# Patient Record
Sex: FEMALE | Race: WHITE | NOT HISPANIC OR LATINO | Employment: FULL TIME | URBAN - NONMETROPOLITAN AREA
[De-identification: names, ages, dates, MRNs, and addresses within clinical notes are randomized per-mention and may not be internally consistent; named-entity substitution may affect disease eponyms.]

---

## 2017-02-09 ENCOUNTER — TRANSFERRED RECORDS (OUTPATIENT)
Dept: HEALTH INFORMATION MANAGEMENT | Facility: HOSPITAL | Age: 52
End: 2017-02-09

## 2017-12-04 ENCOUNTER — TELEPHONE (OUTPATIENT)
Dept: FAMILY MEDICINE | Facility: OTHER | Age: 52
End: 2017-12-04

## 2017-12-04 ENCOUNTER — OFFICE VISIT (OUTPATIENT)
Dept: FAMILY MEDICINE | Facility: OTHER | Age: 52
End: 2017-12-04
Attending: FAMILY MEDICINE
Payer: COMMERCIAL

## 2017-12-04 VITALS
OXYGEN SATURATION: 95 % | DIASTOLIC BLOOD PRESSURE: 88 MMHG | HEIGHT: 63 IN | SYSTOLIC BLOOD PRESSURE: 138 MMHG | WEIGHT: 215 LBS | BODY MASS INDEX: 38.09 KG/M2 | HEART RATE: 112 BPM | RESPIRATION RATE: 18 BRPM | TEMPERATURE: 98.8 F

## 2017-12-04 DIAGNOSIS — I10 BENIGN ESSENTIAL HYPERTENSION: ICD-10-CM

## 2017-12-04 DIAGNOSIS — Z12.31 ENCOUNTER FOR SCREENING MAMMOGRAM FOR BREAST CANCER: ICD-10-CM

## 2017-12-04 DIAGNOSIS — Z76.89 ENCOUNTER TO ESTABLISH CARE: Primary | ICD-10-CM

## 2017-12-04 DIAGNOSIS — F41.9 ANXIETY: ICD-10-CM

## 2017-12-04 PROCEDURE — 99203 OFFICE O/P NEW LOW 30 MIN: CPT | Performed by: FAMILY MEDICINE

## 2017-12-04 RX ORDER — CHLORAL HYDRATE 500 MG
1 CAPSULE ORAL DAILY
COMMUNITY
End: 2023-11-13

## 2017-12-04 RX ORDER — LISINOPRIL AND HYDROCHLOROTHIAZIDE 12.5; 2 MG/1; MG/1
1 TABLET ORAL DAILY
COMMUNITY
End: 2018-01-17

## 2017-12-04 RX ORDER — CETIRIZINE HYDROCHLORIDE 10 MG/1
10 TABLET ORAL EVERY OTHER DAY
COMMUNITY
End: 2020-11-22

## 2017-12-04 RX ORDER — MULTIPLE VITAMINS W/ MINERALS TAB 9MG-400MCG
1 TAB ORAL DAILY
COMMUNITY

## 2017-12-04 RX ORDER — NAPROXEN SODIUM 220 MG
440 TABLET ORAL DAILY
Status: ON HOLD | COMMUNITY
End: 2020-12-06

## 2017-12-04 ASSESSMENT — ANXIETY QUESTIONNAIRES
4. TROUBLE RELAXING: SEVERAL DAYS
2. NOT BEING ABLE TO STOP OR CONTROL WORRYING: SEVERAL DAYS
GAD7 TOTAL SCORE: 6
1. FEELING NERVOUS, ANXIOUS, OR ON EDGE: SEVERAL DAYS
6. BECOMING EASILY ANNOYED OR IRRITABLE: SEVERAL DAYS
IF YOU CHECKED OFF ANY PROBLEMS ON THIS QUESTIONNAIRE, HOW DIFFICULT HAVE THESE PROBLEMS MADE IT FOR YOU TO DO YOUR WORK, TAKE CARE OF THINGS AT HOME, OR GET ALONG WITH OTHER PEOPLE: SOMEWHAT DIFFICULT
7. FEELING AFRAID AS IF SOMETHING AWFUL MIGHT HAPPEN: NOT AT ALL
3. WORRYING TOO MUCH ABOUT DIFFERENT THINGS: SEVERAL DAYS
5. BEING SO RESTLESS THAT IT IS HARD TO SIT STILL: SEVERAL DAYS

## 2017-12-04 ASSESSMENT — PAIN SCALES - GENERAL: PAINLEVEL: NO PAIN (0)

## 2017-12-04 ASSESSMENT — PATIENT HEALTH QUESTIONNAIRE - PHQ9: SUM OF ALL RESPONSES TO PHQ QUESTIONS 1-9: 4

## 2017-12-04 NOTE — NURSING NOTE
"Chief Complaint   Patient presents with     Establish Care       Initial /88 (BP Location: Right arm, Patient Position: Sitting, Cuff Size: Adult Regular)  Pulse 112  Temp 98.8  F (37.1  C) (Tympanic)  Resp 18  Ht 5' 2.75\" (1.594 m)  Wt 215 lb (97.5 kg)  SpO2 95%  BMI 38.39 kg/m2 Estimated body mass index is 38.39 kg/(m^2) as calculated from the following:    Height as of this encounter: 5' 2.75\" (1.594 m).    Weight as of this encounter: 215 lb (97.5 kg).  Medication Reconciliation: complete   Janiya Harper LPN      "

## 2017-12-04 NOTE — MR AVS SNAPSHOT
"              After Visit Summary   12/4/2017    Rosalie RODRIGUES    MRN: [id number]           Patient Information     Date Of Birth          1965        Visit Information        Provider Department      12/4/2017 2:30 PM Anne Solorio MD [facility]        Today's Diagnoses     Encounter to establish care    -  1    Encounter for screening mammogram for breast cancer           Follow-ups after your visit        Your next 10 appointments already scheduled     Jan 08, 2018  9:00 AM CST   (Arrive by 8:45 AM)   MA SCREENING DIGITAL BILATERAL with HCMA1   [facility] [place] [facility] ([facility] [place] )    [address]  [place] [address]   [phone]           Do not use any powder, lotion or deodorant under your arms or on your breast. If you do, we will ask you to remove it before your exam.  Wear comfortable, two-piece clothing.  If you have any allergies, tell your care team.  Bring any previous mammograms from other facilities or have them mailed to the breast center. Three-dimensional (3D) mammograms are available at [facility] locations in [facility], [facility], [place], [place], and [place]. [facility] locations include [place] and [facility] in [place]. Benefits of 3D mammograms include: - Improved rate of cancer detection - Decreases your chance of having to go back for more tests, which means fewer: - \"False-positive\" results (This means that there is an abnormal area but it isn't cancer.) - Invasive testing procedures, such as a biopsy or surgery - Can provide clearer images of the breast if you have dense breast tissue. 3D mammography is an optional exam that anyone can have with a 2D mammogram. It doesn't replace or take the place of a 2D mammogram. 2D mammograms remain an effective screening test for all women.  Not all insurance companies cover the cost of a 3D mammogram. Check with your insurance.       " "     2018  9:30 AM CST   (Arrive by 9:15 AM)   PHYSICAL with Anne Solorio MD   [facility] [place] ([facility] [place] )    [place] [address]   [phone]              Future tests that were ordered for you today     Open Future Orders        Priority Expected Expires Ordered    MA Screening Digital Bilateral Routine  2018            Who to contact     If you have questions or need follow up information about today's clinic visit or your schedule please contact [facility] directly at [phone].  Normal or non-critical lab and imaging results will be communicated to you by Edenbasehart, letter or phone within 4 business days after the clinic has received the results. If you do not hear from us within 7 days, please contact the clinic through Edenbasehart or phone. If you have a critical or abnormal lab result, we will notify you by phone as soon as possible.  Submit refill requests through Daily Pic or call your pharmacy and they will forward the refill request to us. Please allow 3 business days for your refill to be completed.          Additional Information About Your Visit        MyChart Information     Daily Pic lets you send messages to your doctor, view your test results, renew your prescriptions, schedule appointments and more. To sign up, go to [email or web address] . Click on \"Log in\" on the left side of the screen, which will take you to the Welcome page. Then click on \"Sign up Now\" on the right side of the page.     You will be asked to enter the access code listed below, as well as some personal information. Please follow the directions to create your username and password.     Your access code is: [email or web address]  Expires: 3/4/2018  3:06 PM     Your access code will  in 90 days. If you need help or a new code, please call your [facility] or [phone].        Care EveryWhere ID     This is your Care EveryWhere ID. This " "could be used by other organizations to access your [place] medical records  [id number]        Your Vitals Were     Pulse Temperature Respirations Height Pulse Oximetry BMI (Body Mass Index)    112 98.8  F (37.1  C) (Tympanic) 18 5' 2.75\" (1.594 m) 95% 38.39 kg/m2       Blood Pressure from Last 3 Encounters:   12/04/17 140/88    Weight from Last 3 Encounters:   12/04/17 215 lb (97.5 kg)               Primary Care Provider Fax #    Physician No Ref-Primary [phone]       No address on file        Equal Access to Services     [facility]: Hadii aad ku hadasho Soomaali, waaxda luqadaha, qaybta kaalmada adenancyyaeugene, rohit mcclendon . So [facility] [phone].    ATENCIÓN: Si habla español, tiene a melendez disposición servicios gratuitos de asistencia lingüística. Llame al [phone].    We comply with applicable federal civil rights laws and [place] laws. We do not discriminate on the basis of race, color, national origin, age, disability, sex, sexual orientation, or gender identity.            Thank you!     [facility] you [facility]  [facility]. Our goal is always to provide you with excellent care. Hearing back from our patients is one way we can continue to improve our services. Please take a few minutes to complete the written survey that you may receive in the mail after your visit with us. Thank you!             Your Updated Medication List - Protect others around you: Learn how to safely use, store and throw away your medicines at www.disposemymeds.org.          This list is accurate as of: 12/4/17  3:06 PM.  Always use your most recent med list.                   Brand Name Dispense Instructions for use Diagnosis    ALEVE PO      Take 600 mg by mouth daily        CALCIUM + D PO      Take 1,200 mg by mouth daily        cetirizine 10 MG tablet    zyrTEC     Take 10 mg by mouth every other day        fish oil-omega-3 fatty acids 1000 MG capsule      Take 1 " capsule by mouth daily        lisinopril-hydrochlorothiazide 20-12.5 MG per tablet    PRINZIDE/ZESTORETIC     Take 1 tablet by mouth daily        Multi-vitamin Tabs tablet      Take 1 tablet by mouth daily

## 2017-12-04 NOTE — PROGRESS NOTES
"SUBJECTIVE:  Rosalie is a 52 year old female who comes in today for establish care.  Relocated back to area after years away.    HTN - on Lisinopril/HCTZ for several years.  Stable. Use to have home cuff, but misplaced.  No prior diabetes, heart disease or hyperlipidemia.      Arthritis - reports extensive evaluation with prior provider.  Symptoms controlled with 2 Aleve in the morning.    .  Menses becoming irregular.  LMP 10/18, prior to htat 2017.  Last pap negative .  No prior abnormal pap.  Last mammogram same time?    Eye \"freckle\" - follows with ophthalmology.  Has follow up at Dr. Gopi Carrasco's office in [facility].     Anxiety - work related.  Is online .  Prior Lexapro.  Declines counseling.  Prefers to avoid medication at this time.    Current Outpatient Prescriptions   Medication     lisinopril-hydrochlorothiazide (PRINZIDE/ZESTORETIC) 20-12.5 MG per tablet     fish oil-omega-3 fatty acids 1000 MG capsule     multivitamin, therapeutic with minerals (MULTI-VITAMIN) TABS tablet     Naproxen Sodium (ALEVE PO)     cetirizine (ZYRTEC) 10 MG tablet     Calcium Citrate-Vitamin D (CALCIUM + D PO)     No current facility-administered medications for this visit.         Allergies   Allergen Reactions     Adhesive Tape      bandaids - non cloth     Seasonal Allergies      Past Medical History:   Diagnosis Date     Anxiety      Arthritis      Benign essential hypertension      Past Surgical History:   Procedure Laterality Date      tubal ligation Bilateral           SECTION      2     COLONOSCOPY      negative     ENDOSCOPY      negative     Social History     Social History     Marital status: Single     Spouse name: N/A     Number of children: N/A     Years of education: N/A     Occupational History     Not on file.     Social History Main Topics     Smoking status: Never Smoker     Smokeless tobacco: Never Used     Alcohol use Yes      Comment: occasionally " "    Drug use: No     Sexual activity: Not on file     Other Topics Concern     Not on file     Social History Narrative     No narrative on file       ROS:  General: negative for, fever, chills, headaches, dizziness  Skin: negative for, rash  Resp: No shortness of breath and No cough  CV: negative for, chest pain and lower extremity edema  GI: negative for, poor appetite, nausea, vomiting and abdominal pain  : negative  Musculoskeletal: positive for arthritis and joint pain  Neurologic: negative for, local weakness, numbness or tingling of hands and numbness or tingling of feet  Psychiatric: positive for as above, excessive stress and anxiety  Endocrine: negative for, thyroid disorder and diabetes  PHQ-9 SCORE 12/4/2017   Total Score 4     VARSHA-7 SCORE 12/4/2017   Total Score 6         OBJECTIVE:  Vitals:    12/04/17 1411 12/04/17 1656   BP: 140/88 138/88   BP Location: Right arm    Patient Position: Sitting    Cuff Size: Adult Regular Adult Large   Pulse: 112    Resp: 18    Temp: 98.8  F (37.1  C)    TempSrc: Tympanic    SpO2: 95%    Weight: 215 lb (97.5 kg)    Height: 5' 2.75\" (1.594 m)      GENERAL APPEARANCE: alert, no distress, cooperative and over weight  NECK: no adenopathy, no asymmetry, masses, or scars and thyroid normal to palpation  RESP: lungs clear to auscultation - no rales, rhonchi or wheezes  CV: regular rates and rhythm, normal S1 S2, no S3 or S4 and no murmur, click or rub -  MS: extremities normal- no gross deformities noted, no evidence of inflammation in joints, FROM in all extremities.  PSYCH: mentation appears normal. and affect normal/bright    ASSESSMENT/ORDERS:    ICD-10-CM    1. Encounter to establish care Z76.89    2. Encounter for screening mammogram for breast cancer Z12.31 MA Screening Digital Bilateral   3. Anxiety F41.9    4. Benign essential hypertension I10    5. Obesity, Class II, BMI 35-39.9, with comorbidity E66.9      PLAN:  Will obtain records, then schedule physical.  Did " offer nutrition referral - she declined.  Mammogram ordered - to be scheduled same day as physical.  BP borderline control.  Reports component of white coat syndrome.  Check again at physical.  If elevated ,consider outpatient ambulatory BP monitor.    Eye follow up as scheduled.  Defer labs to physical.    Patient Instructions   Sign releases to obtain records.  Schedule complete physical.  Mammogram ordered.        Anne Morrison

## 2017-12-05 ENCOUNTER — TELEPHONE (OUTPATIENT)
Dept: FAMILY MEDICINE | Facility: OTHER | Age: 52
End: 2017-12-05

## 2017-12-05 DIAGNOSIS — I10 ESSENTIAL HYPERTENSION, BENIGN: Primary | ICD-10-CM

## 2017-12-05 RX ORDER — LISINOPRIL AND HYDROCHLOROTHIAZIDE 12.5; 2 MG/1; MG/1
1 TABLET ORAL DAILY
Qty: 30 TABLET | Refills: 0 | Status: SHIPPED | OUTPATIENT
Start: 2017-12-05 | End: 2018-01-07

## 2017-12-05 ASSESSMENT — ANXIETY QUESTIONNAIRES: GAD7 TOTAL SCORE: 6

## 2017-12-05 NOTE — TELEPHONE ENCOUNTER
Reason for call:  Medication    1. Medication Name? Lisinopril HCTZ 20-12.54 mg - 1 tab daily  2. Is this request for a refill? Yes  3. What Pharm[facility] do you use? [facility]  4. Have you contacted your pharmacy? No    5. If yes, when?  (Please note that the turn-around-time for prescriptions is 72 business hours; I am sending your request at this time. SEND TO  [facility] Refill Pool  )  Description: Pt has not had this med filled by Dr Solorio yet. Was last prescribed by her former doctor. Pt only has 4 pills left. Wondering if Dr Solorio will fill? Please call her back at [phone]  Was an appointment offered for this a call? No   Preferred method for responding to this messageTelephone Call  If we cannot reach you directly, may we leave a detailed response at the number you provided? Yes  Can this message wait until your PCP/Provider returns if not available today? Yes, aware provider out today

## 2018-01-07 DIAGNOSIS — I10 ESSENTIAL HYPERTENSION, BENIGN: ICD-10-CM

## 2018-01-09 RX ORDER — LISINOPRIL AND HYDROCHLOROTHIAZIDE 12.5; 2 MG/1; MG/1
TABLET ORAL
Qty: 30 TABLET | Refills: 0 | Status: SHIPPED | OUTPATIENT
Start: 2018-01-09 | End: 2018-01-17

## 2018-01-09 NOTE — TELEPHONE ENCOUNTER
Lisinopril-HCTZ      Last Written Prescription Date:  12/05/2017  Last Fill Quantity: 30,   # refills: 0  Last Office Visit: 12/04/2017  Future Office visit:    Next 5 appointments (look out 90 days)     Jan 17, 2018 10:15 AM CST   (Arrive by 10:00 AM)   PHYSICAL with Anne Solorio MD   [facility] [place] ([facility] [place] )    [address] [place] [address]   [phone]

## 2018-01-17 ENCOUNTER — OFFICE VISIT (OUTPATIENT)
Dept: FAMILY MEDICINE | Facility: OTHER | Age: 53
End: 2018-01-17
Attending: FAMILY MEDICINE
Payer: COMMERCIAL

## 2018-01-17 ENCOUNTER — RADIANT APPOINTMENT (OUTPATIENT)
Dept: MAMMOGRAPHY | Facility: OTHER | Age: 53
End: 2018-01-17
Attending: FAMILY MEDICINE
Payer: COMMERCIAL

## 2018-01-17 VITALS
RESPIRATION RATE: 16 BRPM | BODY MASS INDEX: 37.03 KG/M2 | DIASTOLIC BLOOD PRESSURE: 78 MMHG | SYSTOLIC BLOOD PRESSURE: 136 MMHG | OXYGEN SATURATION: 94 % | WEIGHT: 209 LBS | HEIGHT: 63 IN | HEART RATE: 101 BPM | TEMPERATURE: 99.3 F

## 2018-01-17 DIAGNOSIS — Z13.220 LIPID SCREENING: ICD-10-CM

## 2018-01-17 DIAGNOSIS — Z12.31 ENCOUNTER FOR SCREENING MAMMOGRAM FOR BREAST CANCER: ICD-10-CM

## 2018-01-17 DIAGNOSIS — F41.9 ANXIETY: ICD-10-CM

## 2018-01-17 DIAGNOSIS — E78.5 HYPERLIPIDEMIA, UNSPECIFIED HYPERLIPIDEMIA TYPE: ICD-10-CM

## 2018-01-17 DIAGNOSIS — I10 BENIGN ESSENTIAL HYPERTENSION: ICD-10-CM

## 2018-01-17 DIAGNOSIS — I10 ESSENTIAL HYPERTENSION, BENIGN: ICD-10-CM

## 2018-01-17 DIAGNOSIS — Z23 NEED FOR PROPHYLACTIC VACCINATION AND INOCULATION AGAINST INFLUENZA: ICD-10-CM

## 2018-01-17 DIAGNOSIS — Z00.00 ROUTINE GENERAL MEDICAL EXAMINATION AT A HEALTH CARE FACILITY: Primary | ICD-10-CM

## 2018-01-17 DIAGNOSIS — Z11.59 ENCOUNTER FOR HEPATITIS C SCREENING TEST FOR LOW RISK PATIENT: ICD-10-CM

## 2018-01-17 LAB
ALBUMIN SERPL-MCNC: 4.2 G/DL (ref 3.4–5)
ALP SERPL-CCNC: 74 U/L (ref 40–150)
ALT SERPL W P-5'-P-CCNC: 62 U/L (ref 0–50)
ANION GAP SERPL CALCULATED.3IONS-SCNC: 13 MMOL/L (ref 3–14)
AST SERPL W P-5'-P-CCNC: 41 U/L (ref 0–45)
BASOPHILS # BLD AUTO: 0 10E9/L (ref 0–0.2)
BASOPHILS NFR BLD AUTO: 0.5 %
BILIRUB SERPL-MCNC: 0.5 MG/DL (ref 0.2–1.3)
BUN SERPL-MCNC: 14 MG/DL (ref 7–30)
CALCIUM SERPL-MCNC: 9 MG/DL (ref 8.5–10.1)
CHLORIDE SERPL-SCNC: 106 MMOL/L (ref 94–109)
CHOLEST SERPL-MCNC: 285 MG/DL
CO2 SERPL-SCNC: 23 MMOL/L (ref 20–32)
CREAT SERPL-MCNC: 0.78 MG/DL (ref 0.52–1.04)
DIFFERENTIAL METHOD BLD: NORMAL
EOSINOPHIL # BLD AUTO: 0.1 10E9/L (ref 0–0.7)
EOSINOPHIL NFR BLD AUTO: 1.6 %
ERYTHROCYTE [DISTWIDTH] IN BLOOD BY AUTOMATED COUNT: 12.3 % (ref 10–15)
GFR SERPL CREATININE-BSD FRML MDRD: 78 ML/MIN/1.7M2
GLUCOSE SERPL-MCNC: 91 MG/DL (ref 70–99)
HCT VFR BLD AUTO: 40.4 % (ref 35–47)
HDLC SERPL-MCNC: 57 MG/DL
HGB BLD-MCNC: 14.3 G/DL (ref 11.7–15.7)
IMM GRANULOCYTES # BLD: 0 10E9/L (ref 0–0.4)
IMM GRANULOCYTES NFR BLD: 0.2 %
LDLC SERPL CALC-MCNC: 190 MG/DL
LYMPHOCYTES # BLD AUTO: 1.6 10E9/L (ref 0.8–5.3)
LYMPHOCYTES NFR BLD AUTO: 28.4 %
MCH RBC QN AUTO: 32.4 PG (ref 26.5–33)
MCHC RBC AUTO-ENTMCNC: 35.4 G/DL (ref 31.5–36.5)
MCV RBC AUTO: 92 FL (ref 78–100)
MONOCYTES # BLD AUTO: 0.5 10E9/L (ref 0–1.3)
MONOCYTES NFR BLD AUTO: 8 %
NEUTROPHILS # BLD AUTO: 3.5 10E9/L (ref 1.6–8.3)
NEUTROPHILS NFR BLD AUTO: 61.3 %
NONHDLC SERPL-MCNC: 228 MG/DL
NRBC # BLD AUTO: 0 10*3/UL
NRBC BLD AUTO-RTO: 0 /100
PLATELET # BLD AUTO: 274 10E9/L (ref 150–450)
POTASSIUM SERPL-SCNC: 4 MMOL/L (ref 3.4–5.3)
PROT SERPL-MCNC: 7.6 G/DL (ref 6.8–8.8)
RBC # BLD AUTO: 4.41 10E12/L (ref 3.8–5.2)
SODIUM SERPL-SCNC: 142 MMOL/L (ref 133–144)
TRIGL SERPL-MCNC: 188 MG/DL
TSH SERPL DL<=0.005 MIU/L-ACNC: 2.14 MU/L (ref 0.4–4)
WBC # BLD AUTO: 5.7 10E9/L (ref 4–11)

## 2018-01-17 PROCEDURE — 90471 IMMUNIZATION ADMIN: CPT | Performed by: FAMILY MEDICINE

## 2018-01-17 PROCEDURE — 80061 LIPID PANEL: CPT | Performed by: FAMILY MEDICINE

## 2018-01-17 PROCEDURE — 86803 HEPATITIS C AB TEST: CPT | Mod: 90 | Performed by: FAMILY MEDICINE

## 2018-01-17 PROCEDURE — G0123 SCREEN CERV/VAG THIN LAYER: HCPCS | Performed by: FAMILY MEDICINE

## 2018-01-17 PROCEDURE — 87624 HPV HI-RISK TYP POOLED RSLT: CPT | Mod: 90 | Performed by: FAMILY MEDICINE

## 2018-01-17 PROCEDURE — 80050 GENERAL HEALTH PANEL: CPT | Performed by: FAMILY MEDICINE

## 2018-01-17 PROCEDURE — 90686 IIV4 VACC NO PRSV 0.5 ML IM: CPT | Performed by: FAMILY MEDICINE

## 2018-01-17 PROCEDURE — 36415 COLL VENOUS BLD VENIPUNCTURE: CPT | Performed by: FAMILY MEDICINE

## 2018-01-17 PROCEDURE — 77063 BREAST TOMOSYNTHESIS BI: CPT | Mod: TC

## 2018-01-17 PROCEDURE — 99396 PREV VISIT EST AGE 40-64: CPT | Mod: 25 | Performed by: FAMILY MEDICINE

## 2018-01-17 PROCEDURE — 77067 SCR MAMMO BI INCL CAD: CPT | Mod: TC

## 2018-01-17 PROCEDURE — 99000 SPECIMEN HANDLING OFFICE-LAB: CPT | Performed by: FAMILY MEDICINE

## 2018-01-17 RX ORDER — LISINOPRIL AND HYDROCHLOROTHIAZIDE 12.5; 2 MG/1; MG/1
1 TABLET ORAL DAILY
Qty: 90 TABLET | Refills: 3 | Status: SHIPPED | OUTPATIENT
Start: 2018-01-17 | End: 2019-01-23

## 2018-01-17 RX ORDER — HYDROXYZINE PAMOATE 25 MG/1
25-50 CAPSULE ORAL 3 TIMES DAILY PRN
Qty: 60 CAPSULE | Refills: 1 | Status: SHIPPED | OUTPATIENT
Start: 2018-01-17 | End: 2020-11-22

## 2018-01-17 ASSESSMENT — PAIN SCALES - GENERAL: PAINLEVEL: NO PAIN (0)

## 2018-01-17 ASSESSMENT — PATIENT HEALTH QUESTIONNAIRE - PHQ9: SUM OF ALL RESPONSES TO PHQ QUESTIONS 1-9: 1

## 2018-01-17 ASSESSMENT — ANXIETY QUESTIONNAIRES
7. FEELING AFRAID AS IF SOMETHING AWFUL MIGHT HAPPEN: SEVERAL DAYS
GAD7 TOTAL SCORE: 6
1. FEELING NERVOUS, ANXIOUS, OR ON EDGE: SEVERAL DAYS
4. TROUBLE RELAXING: SEVERAL DAYS
2. NOT BEING ABLE TO STOP OR CONTROL WORRYING: SEVERAL DAYS
3. WORRYING TOO MUCH ABOUT DIFFERENT THINGS: SEVERAL DAYS
6. BECOMING EASILY ANNOYED OR IRRITABLE: NOT AT ALL
5. BEING SO RESTLESS THAT IT IS HARD TO SIT STILL: SEVERAL DAYS
IF YOU CHECKED OFF ANY PROBLEMS ON THIS QUESTIONNAIRE, HOW DIFFICULT HAVE THESE PROBLEMS MADE IT FOR YOU TO DO YOUR WORK, TAKE CARE OF THINGS AT HOME, OR GET ALONG WITH OTHER PEOPLE: SOMEWHAT DIFFICULT

## 2018-01-17 NOTE — PROGRESS NOTES
SUBJECTIVE:   CC: Rosalie RODRIGUES is an 53 year old woman who presents for preventive health visit.     Healthy Habits:    Do you get at least three servings of calcium containing foods daily (dairy, green leafy vegetables, etc.)? yes    Amount of exercise or daily activities, outside of work: 5 day(s) per week; walking; yardwork    Problems taking medications regularly No    Medication side effects: No    Have you had an eye exam in the past two years? yes    Do you see a dentist twice per year? Once per year    Do you have sleep apnea, excessive snoring or daytime drowsiness?no      Hypertension Follow-up      Outpatient blood pressures are not being checked.    Low Salt Diet: no added salt    Anxiety Follow-Up    Status since last visit: Worsened; random anxiety/panic    Other associated symptoms:None    Complicating factors:   Significant life event: No   Current substance abuse: None  Depression symptoms: No    Job is stressful.  Online special education.  Planning to look for new job this summer.      VARSHA-7 SCORE 12/4/2017 1/17/2018   Total Score 6 6       GAD7      Today's PHQ-2 Score: No flowsheet data found.    PHQ-9 SCORE 1/17/2018   Total Score 1     VARSHA-7 SCORE 12/4/2017 1/17/2018   Total Score 6 6         Abuse: Current or Past(Physical, Sexual or Emotional)- No  Do you feel safe in your environment - Yes    Social History   Substance Use Topics     Smoking status: Never Smoker     Smokeless tobacco: Never Used     Alcohol use Yes      Comment: occasionally     If you drink alcohol do you typically have >3 drinks per day or >7 drinks per week? No                     Reviewed orders with patient.  Reviewed health maintenance and updated orders accordingly - Yes  Labs reviewed in [facility]    Patient over age 50, mutual decision to screen reflected in health maintenance.      Pertinent mammograms are reviewed under the imaging tab.  History of abnormal Pap smear: NO - age 30-65 PAP every 5 years with  "negative HPV co-testing recommended    Reviewed and updated as needed this visit by clinical staffTobacco  Allergies  Meds  Problems         Reviewed and updated as needed this visit by Provider        Past Medical History:   Diagnosis Date     Anxiety      Arthralgia     negative lyme, VINAY, RF, TSH, 2017     Arthritis      Benign essential hypertension      Postmenopausal bleeding     ? U/S & Endometrial biopsy done at [facility]?      Past Surgical History:   Procedure Laterality Date      tubal ligation Bilateral           SECTION  /    2     COLONOSCOPY      negative     ENDOSCOPY      negative       ROS:  C: NEGATIVE for fever, chills, change in weight  I: NEGATIVE for worrisome rashes, moles or lesions  E: NEGATIVE for vision changes or irritation  ENT: NEGATIVE for ear, mouth and throat problems  R: NEGATIVE for significant cough or SOB  B: NEGATIVE for masses, tenderness or discharge  CV: NEGATIVE for chest pain, palpitations or peripheral edema  GI: NEGATIVE for nausea, abdominal pain, heartburn, or change in bowel habits  : NEGATIVE for unusual urinary or vaginal symptoms. Periods are regular.  M: NEGATIVE for significant arthralgias or myalgia  N: NEGATIVE for weakness, dizziness or paresthesias  PSYCHIATRIC: POSITIVE for anxiety    OBJECTIVE:   /78 (BP Location: Left arm, Patient Position: Sitting, Cuff Size: Adult Large)  Pulse 101  Temp 99.3  F (37.4  C) (Tympanic)  Resp 16  Ht 5' 2.75\" (1.594 m)  Wt 209 lb (94.8 kg)  LMP 10/18/2017  SpO2 94%  BMI 37.32 kg/m2  EXAM:  GENERAL: alert, no distress and over weight  EYES: Eyes grossly normal to inspection, PERRL and conjunctivae and sclerae normal  HENT: ear canals and TM's normal, nose and mouth without ulcers or lesions  NECK: no adenopathy, no asymmetry, masses, or scars and thyroid normal to palpation  RESP: lungs clear to auscultation - no rales, rhonchi or wheezes  BREAST: normal without masses, " tenderness or nipple discharge and no palpable axillary masses or adenopathy  CV: regular rate and rhythm, normal S1 S2, no S3 or S4, no murmur, click or rub, no peripheral edema and peripheral pulses strong  ABDOMEN: soft, nontender, no hepatosplenomegaly, no masses and bowel sounds normal   (female): normal female external genitalia, normal urethral meatus, vaginal mucosa pink, moist, well rugated, and normal cervix/adnexa/uterus without masses or discharge  MS: no gross musculoskeletal defects noted, no edema  SKIN: no suspicious lesions or rashes  NEURO: Normal strength and tone, mentation intact and speech normal  PSYCH: mentation appears normal, affect normal/bright    ASSESSMENT/PLAN:       ICD-10-CM    1. Routine general medical examination at a health care facility Z00.00 TSH with free T4 reflex     CBC with platelets and differential     Comprehensive metabolic panel     Lipid Profile (Chol, Trig, HDL, LDL calc)     A pap thin layer screen with  HPV - recommended age 30 - 65 years (select HPV order below)     HPV High Risk Types DNA Cervical   2. Obesity, Class II, BMI 35-39.9, with comorbidity E66.9 TSH with free T4 reflex     Lipid Profile (Chol, Trig, HDL, LDL calc)   3. Benign essential hypertension I10 Comprehensive metabolic panel   4. Lipid screening Z13.220 Lipid Profile (Chol, Trig, HDL, LDL calc)   5. Encounter for hepatitis C screening test for low risk patient Z11.59 Hepatitis C antibody   6. Need for prophylactic vaccination and inoculation against influenza Z23 HC FLU VAC PRESRV FREE QUAD SPLIT VIR 3+YRS IM     Vaccine Administration, Initial [47728]   7. Anxiety F41.9 hydrOXYzine (VISTARIL) 25 MG capsule   8. Essential hypertension, benign I10 lisinopril-hydrochlorothiazide (PRINZIDE/ZESTORETIC) 20-12.5 MG per tablet     Will call with remainder of labs and pap results.  Mammogram done today.  Colonoscopy done 2015, negative, due 2025.  Trial of Vistaril for anxiety as needed.  Call if not  "effective.  Consider counseling.  Consider nutrition consult.  Flu shot given.    COUNSELING:   Reviewed preventive health counseling, as reflected in patient instructions       Regular exercise       Healthy diet/nutrition       Vision screening       Hearing screening       Immunizations    Vaccinated for: Influenza           Alcohol Use       Contraception       Colon cancer screening       Consider Hep C screening for patients born between 1945 and 1965       reports that she has never smoked. She has never used smokeless tobacco.    Estimated body mass index is 37.32 kg/(m^2) as calculated from the following:    Height as of this encounter: 5' 2.75\" (1.594 m).    Weight as of this encounter: 209 lb (94.8 kg).   Weight management plan: Discussed healthy diet and exercise guidelines and patient will follow up in 12 months in clinic to re-evaluate.    Counseling Resources:  ATP IV Guidelines  Pooled Cohorts Equation Calculator  Breast Cancer Risk Calculator  FRAX Risk Assessment  ICSI Preventive Guidelines  Dietary Guidelines for Americans, 2010  USDA's MyPlate  ASA Prophylaxis  Lung CA Screening    Anne Morrison MD  [facility] [place]    Injectable Influenza Immunization Documentation    1.  Is the person to be vaccinated sick today?   No    2. Does the person to be vaccinated have an allergy to a component   of the vaccine?   No  Egg Allergy Algorithm Link    3. Has the person to be vaccinated ever had a serious reaction   to influenza vaccine in the past?   No    4. Has the person to be vaccinated ever had Guillain-Barré syndrome?   No    Form completed by Kathy Peña           "

## 2018-01-17 NOTE — PATIENT INSTRUCTIONS
Will call with remainder of labs and pap results.  Mammogram done today.  Colonoscopy done 2015, negative, due 2025.  Trial of Vistaril for anxiety as needed.  Call if not effective.  Consider counseling.  Consider nutrition consult.  Flu shot given.    Preventive Health Recommendations  Female Ages 50 - 64    Yearly exam: See your health care provider every year in order to  o Review health changes.   o Discuss preventive care.    o Review your medicines if your doctor has prescribed any.      Get a Pap test every three years (unless you have an abnormal result and your provider advises testing more often).    If you get Pap tests with HPV test, you only need to test every 5 years, unless you have an abnormal result.     You do not need a Pap test if your uterus was removed (hysterectomy) and you have not had cancer.    You should be tested each year for STDs (sexually transmitted diseases) if you're at risk.     Have a mammogram every 1 to 2 years.    Have a colonoscopy at age 50, or have a yearly FIT test (stool test). These exams screen for colon cancer.      Have a cholesterol test every 5 years, or more often if advised.    Have a diabetes test (fasting glucose) every three years. If you are at risk for diabetes, you should have this test more often.     If you are at risk for osteoporosis (brittle bone disease), think about having a bone density scan (DEXA).    Shots: Get a flu shot each year. Get a tetanus shot every 10 years.    Nutrition:     Eat at least 5 servings of fruits and vegetables each day.    Eat whole-grain bread, whole-wheat pasta and brown rice instead of white grains and rice.    Talk to your provider about Calcium and Vitamin D.     Lifestyle    Exercise at least 150 minutes a week (30 minutes a day, 5 days a week). This will help you control your weight and prevent disease.    Limit alcohol to one drink per day.    No smoking.     Wear sunscreen to prevent skin cancer.     See your dentist  every six months for an exam and cleaning.    See your eye doctor every 1 to 2 years.

## 2018-01-17 NOTE — MR AVS SNAPSHOT
After Visit Summary   1/17/2018    Rosalie RODRIGUES    MRN: [id number]           Patient Information     Date Of Birth          1965        Visit Information        Provider Department      1/17/2018 10:15 Anne Shelton MD [facility] [place]        Today's Diagnoses     Routine general medical examination at a health care facility    -  1    Obesity, Class II, BMI 35-39.9, with comorbidity        Benign essential hypertension        Lipid screening        Encounter for hepatitis C screening test for low risk patient        Need for prophylactic vaccination and inoculation against influenza        Anxiety        Essential hypertension, benign          Care Instructions    Will call with remainder of labs and pap results.  Mammogram done today.  Colonoscopy done 2015, negative, due 2025.  Trial of Vistaril for anxiety as needed.  Call if not effective.  Consider counseling.  Consider nutrition consult.  Flu shot given.    Preventive Health Recommendations  Female Ages 50 - 64    Yearly exam: See your health care provider every year in order to  o Review health changes.   o Discuss preventive care.    o Review your medicines if your doctor has prescribed any.      Get a Pap test every three years (unless you have an abnormal result and your provider advises testing more often).    If you get Pap tests with HPV test, you only need to test every 5 years, unless you have an abnormal result.     You do not need a Pap test if your uterus was removed (hysterectomy) and you have not had cancer.    You should be tested each year for STDs (sexually transmitted diseases) if you're at risk.     Have a mammogram every 1 to 2 years.    Have a colonoscopy at age 50, or have a yearly FIT test (stool test). These exams screen for colon cancer.      Have a cholesterol test every 5 years, or more often if advised.    Have a diabetes test (fasting glucose) every three years. If you are at risk for diabetes,  "you should have this test more often.     If you are at risk for osteoporosis (brittle bone disease), think about having a bone density scan (DEXA).    Shots: Get a flu shot each year. Get a tetanus shot every 10 years.    Nutrition:     Eat at least 5 servings of fruits and vegetables each day.    Eat whole-grain bread, whole-wheat pasta and brown rice instead of white grains and rice.    Talk to your provider about Calcium and Vitamin D.     Lifestyle    Exercise at least 150 minutes a week (30 minutes a day, 5 days a week). This will help you control your weight and prevent disease.    Limit alcohol to one drink per day.    No smoking.     Wear sunscreen to prevent skin cancer.     See your dentist every six months for an exam and cleaning.    See your eye doctor every 1 to 2 years.            Follow-ups after your visit        Who to contact     If you have questions or need follow up information about today's clinic visit or your schedule please contact [facility] directly at [phone].  Normal or non-critical lab and imaging results will be communicated to you by [employer] la [employer]art, letter or phone within 4 business days after the clinic has received the results. If you do not hear from us within 7 days, please contact the clinic through Lending Clubt or phone. If you have a critical or abnormal lab result, we will notify you by phone as soon as possible.  Submit refill requests through Meditech or call your pharmacy and they will forward the refill request to us. Please allow 3 business days for your refill to be completed.          Additional Information About Your Visit        Meditech Information     Meditech lets you send messages to your doctor, view your test results, renew your prescriptions, schedule appointments and more. To sign up, go to [email or web address] . Click on \"Log in\" on the left side of the screen, which will take you to the Welcome page. Then click on \"Sign up Now\" on the right side of " "the page.     You will be asked to enter the access code listed below, as well as some personal information. Please follow the directions to create your username and password.     Your access code is: [email or web address]  Expires: 3/4/2018  3:06 PM     Your access code will  in 90 days. If you need help or a new code, please call your [place] clinic or [phone].        Care EveryWhere ID     This is your Care EveryWhere ID. This could be used by other organizations to access your [facility] medical records  [id number]        Your Vitals Were     Pulse Temperature Respirations Height Last Period Pulse Oximetry    101 99.3  F (37.4  C) (Tympanic) 16 5' 2.75\" (1.594 m) 10/18/2017 94%    BMI (Body Mass Index)                   37.32 kg/m2            Blood Pressure from Last 3 Encounters:   18 136/78   17 138/88    Weight from Last 3 Encounters:   18 209 lb (94.8 kg)   17 215 lb (97.5 kg)              We Performed the Following     CBC with platelets and differential     Comprehensive metabolic panel     HC FLU VAC PRESRV FREE QUAD SPLIT VIR 3+YRS IM     Hepatitis C antibody     Lipid Profile (Chol, Trig, HDL, LDL calc)     TSH with free T4 reflex     Vaccine Administration, Initial [[id number]]          Today's Medication Changes          These changes are accurate as of: 18 10:44 AM.  If you have any questions, ask your nurse or doctor.               Start taking these medicines.        Dose/Directions    hydrOXYzine 25 MG capsule   Commonly known as:  VISTARIL   Used for:  Anxiety   Started by:  Anne Solorio MD        Dose:  25-50 mg   Take 1-2 capsules (25-50 mg) by mouth 3 times daily as needed for anxiety or other (insomnia)   Quantity:  60 capsule   Refills:  1         These medicines have changed or have updated prescriptions.        Dose/Directions    lisinopril-hydrochlorothiazide 20-12.5 MG per tablet   Commonly known as:  PRINZIDE/ZESTORETIC   This may have changed:  See the " new instructions.   Used for:  Essential hypertension, benign   Changed by:  Anne Solorio MD        Dose:  1 tablet   Take 1 tablet by mouth daily   Quantity:  90 tablet   Refills:  3            Where to get your medicines      These [id number] - [place], [place] - [address] AT [facility] [address], [place] [address]     Phone:  [phone]     hydrOXYzine 25 MG capsule    lisinopril-hydrochlorothiazide 20-12.5 MG per tablet                Primary Care Provider Office Phone # Fax #    Anne Solorio [phone]       [facility] [address]  [place] [address]        Equal Access to Services     [facility]: Hadii aad ku hadasho Soomaali, waaxda luqadaha, qaybta kaalmada adeegyada, waxay idiin hayaan mario mcclendon . So [facility] [phone].    ATENCIÓN: Si habla español, tiene a melendez disposición servicios gratuitos de asistencia lingüística. [facility] [phone].    We comply with applicable federal civil rights laws and [place] laws. We do not discriminate on the basis of race, color, national origin, age, disability, sex, sexual orientation, or gender identity.            Thank you!     Thank you for choosing [facility]  for your care. Our goal is always to provide you with excellent care. Hearing back from our patients is one way we can continue to improve our services. Please take a few minutes to complete the written survey that you may receive in the mail after your visit with us. Thank you!             Your Updated Medication List - Protect others around you: Learn how to safely use, store and throw away your medicines at www.disposemymeds.org.          This list is accurate as of: 1/17/18 10:44 AM.  Always use your most recent med list.                   Brand Name Dispense Instructions for use Diagnosis    ALEVE PO      Take 600 mg by mouth daily        CALCIUM + D PO      Take  1,200 mg by mouth daily        cetirizine 10 MG tablet    zyrTEC     Take 10 mg by mouth every other day        fish oil-omega-3 fatty acids 1000 MG capsule      Take 1 capsule by mouth daily        hydrOXYzine 25 MG capsule    VISTARIL    60 capsule    Take 1-2 capsules (25-50 mg) by mouth 3 times daily as needed for anxiety or other (insomnia)    Anxiety       lisinopril-hydrochlorothiazide 20-12.5 MG per tablet    PRINZIDE/ZESTORETIC    90 tablet    Take 1 tablet by mouth daily    Essential hypertension, benign       Multi-vitamin Tabs tablet      Take 1 tablet by mouth daily

## 2018-01-17 NOTE — NURSING NOTE
"Chief Complaint   Patient presents with     Physical     Had mammogram done today before this appointment.       Initial /78 (BP Location: Left arm, Patient Position: Sitting, Cuff Size: Adult Large)  Pulse 101  Temp 99.3  F (37.4  C) (Tympanic)  Resp 16  Ht 5' 2.75\" (1.594 m)  Wt 209 lb (94.8 kg)  LMP 10/18/2017  SpO2 94%  BMI 37.32 kg/m2 Estimated body mass index is 37.32 kg/(m^2) as calculated from the following:    Height as of this encounter: 5' 2.75\" (1.594 m).    Weight as of this encounter: 209 lb (94.8 kg).  Medication Reconciliation: complete     Kathy Peña      "

## 2018-01-18 LAB — HCV AB SERPL QL IA: NONREACTIVE

## 2018-01-19 ASSESSMENT — ANXIETY QUESTIONNAIRES: GAD7 TOTAL SCORE: 6

## 2018-01-23 LAB
COPATH REPORT: NORMAL
PAP: NORMAL

## 2018-01-24 LAB
FINAL DIAGNOSIS: NORMAL
HPV HR 12 DNA CVX QL NAA+PROBE: NEGATIVE
HPV16 DNA SPEC QL NAA+PROBE: NEGATIVE
HPV18 DNA SPEC QL NAA+PROBE: NEGATIVE
SPECIMEN DESCRIPTION: NORMAL
SPECIMEN SOURCE CVX/VAG CYTO: NORMAL

## 2018-01-25 RX ORDER — SIMVASTATIN 20 MG
20 TABLET ORAL AT BEDTIME
Qty: 90 TABLET | Refills: 3 | Status: SHIPPED | OUTPATIENT
Start: 2018-01-25 | End: 2019-02-26

## 2018-05-24 ENCOUNTER — RADIANT APPOINTMENT (OUTPATIENT)
Dept: GENERAL RADIOLOGY | Facility: OTHER | Age: 53
End: 2018-05-24
Attending: FAMILY MEDICINE
Payer: COMMERCIAL

## 2018-05-24 ENCOUNTER — OFFICE VISIT (OUTPATIENT)
Dept: FAMILY MEDICINE | Facility: OTHER | Age: 53
End: 2018-05-24
Attending: FAMILY MEDICINE
Payer: COMMERCIAL

## 2018-05-24 VITALS
SYSTOLIC BLOOD PRESSURE: 136 MMHG | DIASTOLIC BLOOD PRESSURE: 82 MMHG | HEIGHT: 63 IN | TEMPERATURE: 98.7 F | BODY MASS INDEX: 37.03 KG/M2 | HEART RATE: 61 BPM | OXYGEN SATURATION: 98 % | WEIGHT: 209 LBS

## 2018-05-24 DIAGNOSIS — M25.561 RIGHT KNEE PAIN, UNSPECIFIED CHRONICITY: ICD-10-CM

## 2018-05-24 DIAGNOSIS — M25.561 RIGHT KNEE PAIN, UNSPECIFIED CHRONICITY: Primary | ICD-10-CM

## 2018-05-24 PROCEDURE — 73562 X-RAY EXAM OF KNEE 3: CPT | Mod: TC | Performed by: RADIOLOGY

## 2018-05-24 PROCEDURE — 99213 OFFICE O/P EST LOW 20 MIN: CPT | Performed by: FAMILY MEDICINE

## 2018-05-24 ASSESSMENT — ANXIETY QUESTIONNAIRES
2. NOT BEING ABLE TO STOP OR CONTROL WORRYING: NOT AT ALL
7. FEELING AFRAID AS IF SOMETHING AWFUL MIGHT HAPPEN: NOT AT ALL
5. BEING SO RESTLESS THAT IT IS HARD TO SIT STILL: NOT AT ALL
6. BECOMING EASILY ANNOYED OR IRRITABLE: SEVERAL DAYS
3. WORRYING TOO MUCH ABOUT DIFFERENT THINGS: SEVERAL DAYS
GAD7 TOTAL SCORE: 3
1. FEELING NERVOUS, ANXIOUS, OR ON EDGE: SEVERAL DAYS
4. TROUBLE RELAXING: NOT AT ALL

## 2018-05-24 ASSESSMENT — PAIN SCALES - GENERAL: PAINLEVEL: NO PAIN (0)

## 2018-05-24 NOTE — MR AVS SNAPSHOT
"              After Visit Summary   5/24/2018    Rosalie RODRIGUES    MRN: [id number]           Patient Information     Date Of Birth          1965        Visit Information        Provider Department      5/24/2018 4:00 PM [facility]        Today's Diagnoses     Right knee pain, unspecified chronicity    -  1      Care Instructions    Xray today - will call with results.  Proceed with MRI as next step - they will contact you to schedule.  Continue ice, bracing, Ibuprofen.            Follow-ups after your visit        Future tests that were ordered for you today     Open Future Orders        Priority Expected Expires Ordered    XR Knee Right 3 Views Routine  5/24/2019 5/24/2018    MR Knee Right w/o Contrast Routine  5/24/2019 5/24/2018            Who to contact     If you have questions or need follow up information about today's clinic visit or your schedule please contact [facility] directly at [phone].  Normal or non-critical lab and imaging results will be communicated to you by MyChart, letter or phone within 4 business days after the clinic has received the results. If you do not hear from us within 7 days, please contact the clinic through MyChart or phone. If you have a critical or abnormal lab result, we will notify you by phone as soon as possible.  Submit refill requests through TerraPass or call your pharmacy and they will forward the refill request to us. Please allow 3 business days for your refill to be completed.          Additional Information About Your Visit        Care EveryWhere ID     This is your Care EveryWhere ID. This could be used by other organizations to access your [place] medical records  [id number]        Your Vitals Were     Pulse Temperature Height Pulse Oximetry BMI (Body Mass Index)       61 98.7  F (37.1  C) (Tympanic) 5' 2.75\" (1.594 m) 98% 37.32 kg/m2        Blood Pressure from Last 3 Encounters:   05/24/18 136/82 "   01/17/18 136/78   12/04/17 138/88    Weight from Last 3 Encounters:   05/24/18 209 lb (94.8 kg)   01/17/18 209 lb (94.8 kg)   12/04/17 215 lb (97.5 kg)               Primary Care Provider Office Phone # Fax #    Anne Solorio [phone] [address]  [facility] [address]        Equal Access to Services     [facility] : Hadii aad ku hadasho Soomaali, waaxda luqadaha, qaybta kaalmada adeegyada, waxay idiin hayaan adeeg kharash la'adann . So [facility] [phone].    ATENCIÓN: Si habla español, tiene a melendez disposición servicios gratuitos de asistencia lingüística. [facility] [phone].    We comply with applicable federal civil rights laws and [place] laws. We do not discriminate on the basis of race, color, national origin, age, disability, sex, sexual orientation, or gender identity.            Thank you!     Thank you for choosing [facility]  for your care. Our goal is always to provide you with excellent care. Hearing back from our patients is one way we can continue to improve our services. Please take a few minutes to complete the written survey that you may receive in the mail after your visit with us. Thank you!             Your Updated Medication List - Protect others around you: Learn how to safely use, store and throw away your medicines at www.disposemymeds.org.          This list is accurate as of 5/24/18  4:43 PM.  Always use your most recent med list.                   Brand Name Dispense Instructions for use Diagnosis    ALEVE PO      Take 600 mg by mouth daily        CALCIUM + D PO      Take 1,200 mg by mouth daily        cetirizine 10 MG tablet    zyrTEC     Take 10 mg by mouth every other day        fish oil-omega-3 fatty acids 1000 MG capsule      Take 1 capsule by mouth daily        hydrOXYzine 25 MG capsule    VISTARIL    60 capsule    Take 1-2 capsules (25-50 mg) by mouth 3 times daily as needed for anxiety or other (insomnia)    Anxiety        lisinopril-hydrochlorothiazide 20-12.5 MG per tablet    PRINZIDE/ZESTORETIC    90 tablet    Take 1 tablet by mouth daily    Essential hypertension, benign       Multi-vitamin Tabs tablet      Take 1 tablet by mouth daily        simvastatin 20 MG tablet    ZOCOR    90 tablet    Take 1 tablet (20 mg) by mouth At Bedtime    Hyperlipidemia, unspecified hyperlipidemia type
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
## 2018-05-24 NOTE — PATIENT INSTRUCTIONS
Xray today - will call with results.  Proceed with MRI as next step - they will contact you to schedule.  Continue ice, bracing, Ibuprofen.

## 2018-05-24 NOTE — NURSING NOTE
"Chief Complaint   Patient presents with     Musculoskeletal Problem     right knee pain.       Initial /82 (BP Location: Right arm, Patient Position: Sitting, Cuff Size: Adult Large)  Pulse 61  Temp 98.7  F (37.1  C) (Tympanic)  Ht 5' 2.75\" (1.594 m)  Wt 209 lb (94.8 kg)  SpO2 98%  BMI 37.32 kg/m2 Estimated body mass index is 37.32 kg/(m^2) as calculated from the following:    Height as of this encounter: 5' 2.75\" (1.594 m).    Weight as of this encounter: 209 lb (94.8 kg).  Medication Reconciliation: complete    Kathy Peña LPN    "

## 2018-05-25 ASSESSMENT — PATIENT HEALTH QUESTIONNAIRE - PHQ9: SUM OF ALL RESPONSES TO PHQ QUESTIONS 1-9: 2

## 2018-05-25 ASSESSMENT — ANXIETY QUESTIONNAIRES: GAD7 TOTAL SCORE: 3

## 2018-05-31 ENCOUNTER — HOSPITAL ENCOUNTER (OUTPATIENT)
Dept: MRI IMAGING | Facility: HOSPITAL | Age: 53
Discharge: HOME | End: 2018-05-31
Attending: FAMILY MEDICINE | Admitting: FAMILY MEDICINE
Payer: COMMERCIAL

## 2018-05-31 DIAGNOSIS — M25.561 RIGHT KNEE PAIN, UNSPECIFIED CHRONICITY: ICD-10-CM

## 2018-05-31 PROCEDURE — 73721 MRI JNT OF LWR EXTRE W/O DYE: CPT | Mod: TC,RT

## 2018-06-01 ENCOUNTER — TELEPHONE (OUTPATIENT)
Dept: FAMILY MEDICINE | Facility: OTHER | Age: 53
End: 2018-06-01

## 2018-06-01 DIAGNOSIS — R93.89 ABNORMAL MRI: ICD-10-CM

## 2018-06-01 DIAGNOSIS — M25.561 RIGHT KNEE PAIN, UNSPECIFIED CHRONICITY: Primary | ICD-10-CM

## 2018-06-14 ENCOUNTER — OFFICE VISIT (OUTPATIENT)
Dept: ORTHOPEDICS | Facility: OTHER | Age: 53
End: 2018-06-14
Attending: FAMILY MEDICINE
Payer: COMMERCIAL

## 2018-06-14 VITALS
TEMPERATURE: 98.2 F | BODY MASS INDEX: 37.03 KG/M2 | DIASTOLIC BLOOD PRESSURE: 84 MMHG | HEIGHT: 63 IN | WEIGHT: 209 LBS | HEART RATE: 87 BPM | OXYGEN SATURATION: 94 % | SYSTOLIC BLOOD PRESSURE: 120 MMHG

## 2018-06-14 DIAGNOSIS — R93.89 ABNORMAL MRI: ICD-10-CM

## 2018-06-14 DIAGNOSIS — M25.561 RIGHT KNEE PAIN, UNSPECIFIED CHRONICITY: ICD-10-CM

## 2018-06-14 PROCEDURE — 99203 OFFICE O/P NEW LOW 30 MIN: CPT | Performed by: ORTHOPAEDIC SURGERY

## 2018-06-14 RX ORDER — GLUCOSAMINE SULFATE 500 MG
CAPSULE ORAL
COMMUNITY
End: 2020-11-22

## 2018-06-14 ASSESSMENT — PAIN SCALES - GENERAL: PAINLEVEL: NO PAIN (1)

## 2018-06-14 NOTE — MR AVS SNAPSHOT
"              After Visit Summary   6/14/2018    Rosalie Stewart    MRN: [id number]           Patient Information     Date Of Birth          1965        Visit Information        Provider Department      6/14/2018 10:40 AM Albin Yun,   [facility]        Today's Diagnoses     Right knee pain, unspecified chronicity        Abnormal MRI           Follow-ups after your visit        Who to contact     If you have questions or need follow up information about today's clinic visit or your schedule please contact  [facility] directly at [phone].  Normal or non-critical lab and imaging results will be communicated to you by MyChart, letter or phone within 4 business days after the clinic has received the results. If you do not hear from us within 7 days, please contact the clinic through MyChart or phone. If you have a critical or abnormal lab result, we will notify you by phone as soon as possible.  Submit refill requests through [employer] or call your pharmacy and they will forward the refill request to us. Please allow 3 business days for your refill to be completed.          Additional Information About Your Visit        Your Vitals Were     Pulse Temperature Height Pulse Oximetry BMI (Body Mass Index)       87 98.2  F (36.8  C) (Tympanic) 5' 2.75\" (1.594 m) 94% 37.32 kg/m2        Blood Pressure from Last 3 Encounters:   06/14/18 120/84   05/24/18 136/82   01/17/18 136/78    Weight from Last 3 Encounters:   06/14/18 209 lb (94.8 kg)   05/24/18 209 lb (94.8 kg)   01/17/18 209 lb (94.8 kg)              Today, you had the following     No orders found for display       Primary Care Provider Office Phone # Fax #    Anne Solorio [phone] [address] [place] [address]        Equal Access to Services     XOCHILT GUEVARA : Mackenzie Corley, rosa barlow, rohit bello. So [facility] [phone].    ATENCIÓN: " Si habla cesia, tiene a melendez disposición servicios gratuitos de asistencia lingüística. [facility] [phone].    We comply with applicable federal civil rights laws and [place] laws. We do not discriminate on the basis of race, color, national origin, age, disability, sex, sexual orientation, or gender identity.            Thank you!     Thank you for choosing  [facility]  for your care. Our goal is always to provide you with excellent care. Hearing back from our patients is one way we can continue to improve our services. Please take a few minutes to complete the written survey that you may receive in the mail after your visit with us. Thank you!             Your Updated Medication List - Protect others around you: Learn how to safely use, store and throw away your medicines at www.disposemymeds.org.          This list is accurate as of 6/14/18 11:54 AM.  Always use your most recent med list.                   Brand Name Dispense Instructions for use Diagnosis    ALEVE PO      Take 600 mg by mouth daily        CALCIUM + D PO      Take 1,200 mg by mouth daily        cetirizine 10 MG tablet    zyrTEC     Take 10 mg by mouth every other day        fish oil-omega-3 fatty acids 1000 MG capsule      Take 1 capsule by mouth daily        glucosamine 500 MG Caps           hydrOXYzine 25 MG capsule    VISTARIL    60 capsule    Take 1-2 capsules (25-50 mg) by mouth 3 times daily as needed for anxiety or other (insomnia)    Anxiety       lisinopril-hydrochlorothiazide 20-12.5 MG per tablet    PRINZIDE/ZESTORETIC    90 tablet    Take 1 tablet by mouth daily    Essential hypertension, benign       Multi-vitamin Tabs tablet      Take 1 tablet by mouth daily        simvastatin 20 MG tablet    ZOCOR    90 tablet    Take 1 tablet (20 mg) by mouth At Bedtime    Hyperlipidemia, unspecified hyperlipidemia type

## 2018-06-14 NOTE — NURSING NOTE
"Chief Complaint   Patient presents with     Musculoskeletal Problem     NPT Right Knee Pain X 2 months       Initial /84  Pulse 87  Temp 98.2  F (36.8  C) (Tympanic)  Ht 5' 2.75\" (1.594 m)  Wt 209 lb (94.8 kg)  SpO2 94%  BMI 37.32 kg/m2 Estimated body mass index is 37.32 kg/(m^2) as calculated from the following:    Height as of this encounter: 5' 2.75\" (1.594 m).    Weight as of this encounter: 209 lb (94.8 kg).  Medication Reconciliation: complete    Mouna Morris LPN    "

## 2018-06-14 NOTE — PROGRESS NOTES
Patient presents today for evaluation of her right knee.  She is been having some swelling, medial sided and anterior knee pain for the last several months.  She also relates a history of remote injury approximately 8 years ago where she strained her medial collateral ligament and medial patellofemoral ligament.  She thought she completely healed from that but the symptoms she's been having since the winter or very similar.  She complains of the recurrent swelling, and aching pain along the anterior aspect of her knee, crepitation, and a vague pain along the medial aspect of the distal femur and proximal tibia.  She denies any jaylin instability, denies any mechanical locking of the knee.  She does relate that she began taking glucosamine and chondroitin sulfate approximately a week ago and has noticed a significant improvement in her discomfort as well as some diminishment of the swelling.  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 and this is what exacerbated the knee.  Even when that job was over, the knee symptoms did not begin to greg until she began taking this chondroitin and glucosamine.    Physical exam: Left Knee demonstrates full range of motion, the skin is intact, skin is warm and sensation is normal.  Motor strength in the left lower extremity is normal.  right knee exam demonstrates slight genu valgum, the joint line is nontender, patellofemoral mechanics are benign with a normal glide, tilt, and apprehension exam.  The left knee is stable to varus valgus and drawer exam, there is no muscle atrophy and no effusion.  Exam of the right knee demonstrates slight atrophy of the vastus medialis oblique is muscle, there is a mild effusion present.  The skin is intact and sensation is normal.  There is mild tenderness along the medial aspect of the knee,  "the knee is stable to varus valgus and drawer.  There is crepitation with range of motion of the knee, there is a positive glide, tilt, and apprehension exam of the right patella.  X-rays and MRI are reviewed.  The right knee demonstrates slight lateralization of the right patella, narrowing of the lateral patellofemoral joint space and a relatively flat lateral trochlear wall.  The MRI confirms these findings as well as some mild degenerative changes and some effusion with particulate debris.    Assessment and plan: Light of her improvement on the regimen of glucosamine chondroitin sulfate, I recommend she continue this for approximately the next 4-6 weeks.  She should also try to increase her activity and perform some VMO strengthening exercises.  If this corrects her knee pain to her satisfaction, then she should continue this regimen on the other hand if it fails to resolve her symptoms and certainly if they worsen, she should follow up for repeat evaluation and consider an arthroscopic exam and lateral release./84  Pulse 87  Temp 98.2  F (36.8  C) (Tympanic)  Ht 5' 2.75\" (1.594 m)  Wt 209 lb (94.8 kg)  SpO2 94%  BMI 37.32 kg/m2  "

## 2019-01-23 DIAGNOSIS — I10 ESSENTIAL HYPERTENSION, BENIGN: ICD-10-CM

## 2019-01-23 RX ORDER — LISINOPRIL AND HYDROCHLOROTHIAZIDE 12.5; 2 MG/1; MG/1
1 TABLET ORAL DAILY
Qty: 90 TABLET | Refills: 0 | Status: SHIPPED | OUTPATIENT
Start: 2019-01-23 | End: 2019-04-24

## 2019-01-23 NOTE — TELEPHONE ENCOUNTER
Lisinopril-HCTZ  Last Written Prescription Date: 1/17/18  Last Fill Quantity: 90 # of Refills: 3  Last Office Visit: 5/24/18

## 2019-01-23 NOTE — LETTER
January 23, 2019      Rosalie Stewart  [address] [place] [id number]        Dear Rosalie,     We are concerned about your health care.  We recently provided you with medication refills.  Lab tests are required every year in order to continue refilling your Lisinopril/HCTZ.  Orders have been placed in our computer and should be accessible at most [facility] labs. You WILL need to be fasting for this lab. Please complete this as soon as possible. If you have any questions please call us at [phone].      Sincerely,  Anne Morrison MD

## 2019-05-30 DIAGNOSIS — E78.5 HYPERLIPIDEMIA, UNSPECIFIED HYPERLIPIDEMIA TYPE: ICD-10-CM

## 2019-05-30 NOTE — LETTER
Lucía 3, 2019      Rosalie Stewart  [address] [place] [id number]        Dear Rosalie,     APPOINTMENT REMINDER:   Our records indicates that it is time for you to be seen for a yearly follow up appointment and labs.     Your current medication request for Zocor will be approved for one refill but you will need to be seen before any additional refills can be approved.  Taking care of your health is important to us, and ongoing visits with your provider are vital to your care.    We look forward to seeing you in the near future.  You may call our office at [phone] to schedule a visit.     Please disregard this notice if you have already made an appointment.      Sincerely,  Anne Morrison MD

## 2019-05-31 NOTE — TELEPHONE ENCOUNTER
Simvastatin  Last Written Prescription Date: 3/1/19  Last Fill Quantity: 90 # of Refills: 0  Last Office Visit: 5/24/18

## 2019-06-03 RX ORDER — SIMVASTATIN 20 MG
TABLET ORAL
Qty: 30 TABLET | Refills: 0 | Status: SHIPPED | OUTPATIENT
Start: 2019-06-03 | End: 2019-06-27

## 2019-06-03 NOTE — TELEPHONE ENCOUNTER
Protocol failed due to    LDL on file in past 12 months    Recent (12 mo) or future (30 days) visit within the authorizing provider's specialty     Lab ordered. Letter sent. Please advise. Thank you!

## 2019-06-30 DIAGNOSIS — E78.5 HYPERLIPIDEMIA, UNSPECIFIED HYPERLIPIDEMIA TYPE: ICD-10-CM

## 2019-06-30 DIAGNOSIS — I10 ESSENTIAL HYPERTENSION, BENIGN: ICD-10-CM

## 2019-07-01 NOTE — TELEPHONE ENCOUNTER
lisinopril-hydrochlorothiazide (PRINZIDE/ZESTORETIC) 20-12.5 MG tablet  Last Written Prescription Date:  6/28/19  Last Fill Quantity: 7,   # refills: 0  Last Office Visit: 5/24/18  Future Office visit:    Next 5 appointments (look out 90 days)    Jul 19, 2019  2:30 PM CDT  (Arrive by 2:15 PM)  PHYSICAL with Anne Solorio MD  [facility] ([facility] ) [address]  [place] [address]  [phone]       zocor       Last Written Prescription Date:  6/28/19  Last Fill Quantity: 7,   # refills: 0  Last Office Visit: 5/24/18  Future Office visit:    Next 5 appointments (look out 90 days)    Jul 19, 2019  2:30 PM CDT  (Arrive by 2:15 PM)  PHYSICAL with Anne Solorio MD  [facility] ([facility] ) [address]  [place] [address]  [phone]

## 2019-07-02 RX ORDER — LISINOPRIL AND HYDROCHLOROTHIAZIDE 12.5; 2 MG/1; MG/1
TABLET ORAL
Qty: 17 TABLET | Refills: 0 | Status: SHIPPED | OUTPATIENT
Start: 2019-07-02 | End: 2019-07-29

## 2019-07-02 RX ORDER — SIMVASTATIN 20 MG
TABLET ORAL
Qty: 17 TABLET | Refills: 0 | Status: SHIPPED | OUTPATIENT
Start: 2019-07-02 | End: 2019-07-29

## 2019-07-19 PROBLEM — E78.5 HYPERLIPIDEMIA, UNSPECIFIED HYPERLIPIDEMIA TYPE: Status: ACTIVE | Noted: 2019-07-19

## 2019-07-29 DIAGNOSIS — I10 ESSENTIAL HYPERTENSION, BENIGN: ICD-10-CM

## 2019-07-29 DIAGNOSIS — E78.5 HYPERLIPIDEMIA, UNSPECIFIED HYPERLIPIDEMIA TYPE: ICD-10-CM

## 2019-07-29 NOTE — TELEPHONE ENCOUNTER
Lisinopril-HCTZ  Last Written Prescription Date: 7/2/19  Last Fill Quantity: 17 # of Refills: 0  Last Office Visit: 5/24/18    Simvastatin  Last Written Prescription Date: 7/2/19  Last Fill Quantity: 17 # of Refills: 0  Last Office Visit: 5/24/18    Pt has an appointment 8/5/19.  Pt was a no show at her last appointment because she wasn't checked in and then she left.

## 2019-07-30 RX ORDER — SIMVASTATIN 20 MG
20 TABLET ORAL AT BEDTIME
Qty: 30 TABLET | Refills: 0 | Status: SHIPPED | OUTPATIENT
Start: 2019-07-30 | End: 2019-08-27

## 2019-07-30 RX ORDER — LISINOPRIL AND HYDROCHLOROTHIAZIDE 12.5; 2 MG/1; MG/1
1 TABLET ORAL DAILY
Qty: 30 TABLET | Refills: 0 | Status: SHIPPED | OUTPATIENT
Start: 2019-07-30 | End: 2019-08-05 | Stop reason: DRUGHIGH

## 2019-08-01 NOTE — PROGRESS NOTES
SUBJECTIVE:   CC: Rosalie Stewart is an 54 year old woman who presents for preventive health visit.     Healthy Habits:    Do you get at least three servings of calcium containing foods daily (dairy, green leafy vegetables, etc.)? yes    Amount of exercise or daily activities, outside of work: 3 day(s) per week    Problems taking medications regularly No    Medication side effects: No    Have you had an eye exam in the past two years? yes    Do you see a dentist twice per year? No-once a year    Do you have sleep apnea, excessive snoring or daytime drowsiness?no    Colonoscopy negative 2015    Mammogram 1/2018    Pap/hpv negative 1/2018    Due for Shingrix      Hyperlipidemia Follow-Up    Are you having any of the following symptoms? (Select all that apply)  No complaints of shortness of breath, chest pain or pressure.  No increased sweating or nausea with activity.  No left-sided neck or arm pain.  No complaints of pain in calves when walking 1-2 blocks.    Are you regularly taking any medication or supplement to lower your cholesterol?   Yes- .    Are you having muscle aches or other side effects that you think could be caused by your cholesterol lowering medication?  No      Hypertension Follow-up    Do you check your blood pressure regularly outside of the clinic? No     Are you following a low salt diet? No    Are your blood pressures ever more than 140 on the top number (systolic) OR more   than 90 on the bottom number (diastolic), for example 140/90? Yes-at the clinic    Anxiety Follow-Up    How are you doing with your anxiety since your last visit? Worsened stress of school    Are you having other symptoms that might be associated with anxiety? Yes:  panic    Have you had a significant life event? No     Are you feeling depressed? No    Do you have any concerns with your use of alcohol or other drugs? No     Vistaril makes too tired - can use for insomnia prn, but not for anxiety    Declines  counseling    Open to trial of Buspar    Social History     Tobacco Use     Smoking status: Never Smoker     Smokeless tobacco: Never Used   Substance Use Topics     Alcohol use: Yes     Comment: occasionally     Drug use: No     VARSHA-7 SCORE 1/17/2018 5/24/2018 8/5/2019   Total Score 6 3 4     PHQ 1/17/2018 5/24/2018 8/5/2019   PHQ-9 Total Score 1 2 3   Q9: Thoughts of better off dead/self-harm past 2 weeks Not at all Not at all Not at all           Today's PHQ-2 Score:   PHQ-2 ( 1999 [employer]) 8/5/2019   Q1: Little interest or pleasure in doing things 2   Q2: Feeling down, depressed or hopeless 1   PHQ-2 Score 3       Abuse: Current or Past(Physical, Sexual or Emotional)- No  Do you feel safe in your environment? Yes    Social History     Tobacco Use     Smoking status: Never Smoker     Smokeless tobacco: Never Used   Substance Use Topics     Alcohol use: Yes     Comment: occasionally     If you drink alcohol do you typically have >3 drinks per day or >7 drinks per week? Yes - occasionally                      Reviewed orders with patient.  Reviewed health maintenance and updated orders accordingly - Yes  Current Outpatient Medications   Medication     busPIRone (BUSPAR) 10 MG tablet     Calcium Citrate-Vitamin D (CALCIUM + D PO)     cetirizine (ZYRTEC) 10 MG tablet     fish oil-omega-3 fatty acids 1000 MG capsule     glucosamine 500 MG CAPS     hydrOXYzine (VISTARIL) 25 MG capsule     lisinopril-hydrochlorothiazide (PRINZIDE/ZESTORETIC) 20-25 MG tablet     multivitamin, therapeutic with minerals (MULTI-VITAMIN) TABS tablet     Naproxen Sodium (ALEVE PO)     simvastatin (ZOCOR) 20 MG tablet     No current facility-administered medications for this visit.        Labs reviewed in EPIC    Mammogram Screening: Patient over age 50, mutual decision to screen reflected in health maintenance.    Pertinent mammograms are reviewed under the imaging tab.  History of abnormal Pap smear: NO - age 30-65 PAP every 5 years with  "negative HPV co-testing recommended  PAP / HPV Latest Ref Rng & Units 2018   PAP - NIL   HPV 16 DNA NEG:Negative Negative   HPV 18 DNA NEG:Negative Negative   OTHER HR HPV NEG:Negative Negative     Reviewed and updated as needed this visit by clinical staff  Tobacco  Allergies  Meds  Med Hx  Surg Hx  Fam Hx  Soc Hx        Reviewed and updated as needed this visit by Provider        Past Medical History:   Diagnosis Date     Anxiety      Arthralgia     negative lyme, VINAY, RF, TSH, 2017     Arthritis      Benign essential hypertension      Postmenopausal bleeding     ? U/S & Endometrial biopsy done at [facility]?      Past Surgical History:   Procedure Laterality Date      tubal ligation Bilateral           SECTION      2     COLONOSCOPY      negative     ENDOSCOPY      negative       ROS:  CONSTITUTIONAL: NEGATIVE for fever, chills, change in weight  INTEGUMENTARY/SKIN: NEGATIVE for worrisome rashes, moles or lesions  EYES: NEGATIVE for vision changes or irritation  ENT: NEGATIVE for ear, mouth and throat problems  RESP: NEGATIVE for significant cough or SOB  BREAST: NEGATIVE for masses, tenderness or discharge  CV: NEGATIVE for chest pain, palpitations or peripheral edema  GI: NEGATIVE for nausea, abdominal pain, heartburn, or change in bowel habits  : NEGATIVE for unusual urinary or vaginal symptoms. No vaginal bleeding.  MUSCULOSKELETAL: NEGATIVE for significant arthralgias or myalgia  NEURO: NEGATIVE for weakness, dizziness or paresthesias  PSYCHIATRIC: POSITIVE as above for changes in mood or affect     OBJECTIVE:   BP (!) 142/90 (BP Location: Left arm, Patient Position: Sitting, Cuff Size: Adult Large)   Temp 98.3  F (36.8  C) (Tympanic)   Ht 1.581 m (5' 2.25\")   Wt 105 kg (231 lb 6.4 oz)   SpO2 96%   BMI 41.98 kg/m    EXAM:  GENERAL APPEARANCE: alert, no distress and obese  EYES: Eyes grossly normal to inspection, PERRL and conjunctivae and sclerae " normal  HENT: ear canals and TM's normal, nose and mouth without ulcers or lesions, oropharynx clear and oral mucous membranes moist  NECK: no adenopathy, no asymmetry, masses, or scars and thyroid normal to palpation  RESP: lungs clear to auscultation - no rales, rhonchi or wheezes  BREAST: normal without masses, tenderness or nipple discharge and no palpable axillary masses or adenopathy  CV: regular rate and rhythm, normal S1 S2, no S3 or S4, no murmur, click or rub, no peripheral edema and peripheral pulses strong  ABDOMEN: soft, nontender, no hepatosplenomegaly, no masses and bowel sounds normal   (female): normal female external genitalia, normal urethral meatus, vaginal mucosal atrophy noted and no adnexal masses  MS: no musculoskeletal defects are noted and gait is age appropriate without ataxia  SKIN: no suspicious lesions or rashes  NEURO: Normal strength and tone, sensory exam grossly normal, mentation intact and speech normal  PSYCH: mentation appears normal and affect normal/bright    Diagnostic Test Results:  Future labs ordered.  Prior labs reviewed.    ASSESSMENT/PLAN:       ICD-10-CM    1. Routine general medical examination at a health care facility Z00.00 CBC with platelets differential     Comprehensive metabolic panel     Lipid Profile     MA Screening Digital Bilateral   2. Essential hypertension, benign I10 CBC with platelets differential     Comprehensive metabolic panel     lisinopril-hydrochlorothiazide (PRINZIDE/ZESTORETIC) 20-25 MG tablet   3. Hyperlipidemia, unspecified hyperlipidemia type E78.5 Comprehensive metabolic panel     Lipid Profile   4. Anxiety F41.9 busPIRone (BUSPAR) 10 MG tablet     Will call with lab results.  Mammogram ordered.  Colonoscopy negative 2015, due 2025.  Pap/HPV testing negative 1/2018, due 2023.  Shingrix vaccine advised.  Trial of Buspar for anxiety.    Increase Zestoretic from 10/12.5 to 20/12.5.  Follow up 1 month.    COUNSELING:   Reviewed preventive  "health counseling, as reflected in patient instructions       Regular exercise       Healthy diet/nutrition       Vision screening       Hearing screening       Immunizations    Shingrix advise - out of stock here             Alcohol Use       Osteoporosis Prevention/Bone Health       Colon cancer screening       Consider Hep C screening for patients born between 1945 and 1965    Estimated body mass index is 41.98 kg/m  as calculated from the following:    Height as of this encounter: 1.581 m (5' 2.25\").    Weight as of this encounter: 105 kg (231 lb 6.4 oz).    Weight management plan: Discussed healthy diet and exercise guidelines    Patient considering joining [employer].        reports that she has never smoked. She has never used smokeless tobacco.      Counseling Resources:  ATP IV Guidelines  Pooled Cohorts Equation Calculator  Breast Cancer Risk Calculator  FRAX Risk Assessment  ICSI Preventive Guidelines  Dietary Guidelines for Americans, 2010  USDA's MyPlate  ASA Prophylaxis  Lung CA Screening    Anne Morrison MD  [facility] [place]  "

## 2019-08-01 NOTE — PATIENT INSTRUCTIONS
Will call with lab results.  Mammogram ordered.  Colonoscopy negative 2015, due 2025.  Pap/HPV testing negative 1/2018, due 2023.  Shingrix vaccine advised.  Trial of Buspar for anxiety.    Increase Zestoretic from 10/12.5 to 20/12.5.  Follow up 1 month.    Preventive Health Recommendations  Female Ages 50 - 64    Yearly exam: See your health care provider every year in order to  o Review health changes.   o Discuss preventive care.    o Review your medicines if your doctor has prescribed any.      Get a Pap test every three years (unless you have an abnormal result and your provider advises testing more often).    If you get Pap tests with HPV test, you only need to test every 5 years, unless you have an abnormal result.     You do not need a Pap test if your uterus was removed (hysterectomy) and you have not had cancer.    You should be tested each year for STDs (sexually transmitted diseases) if you're at risk.     Have a mammogram every 1 to 2 years.    Have a colonoscopy at age 50, or have a yearly FIT test (stool test). These exams screen for colon cancer.      Have a cholesterol test every 5 years, or more often if advised.    Have a diabetes test (fasting glucose) every three years. If you are at risk for diabetes, you should have this test more often.     If you are at risk for osteoporosis (brittle bone disease), think about having a bone density scan (DEXA).    Shots: Get a flu shot each year. Get a tetanus shot every 10 years.    Nutrition:     Eat at least 5 servings of fruits and vegetables each day.    Eat whole-grain bread, whole-wheat pasta and brown rice instead of white grains and rice.    Get adequate Calcium and Vitamin D.     Lifestyle    Exercise at least 150 minutes a week (30 minutes a day, 5 days a week). This will help you control your weight and prevent disease.    Limit alcohol to one drink per day.    No smoking.     Wear sunscreen to prevent skin cancer.     See your dentist every six  months for an exam and cleaning.    See your eye doctor every 1 to 2 years.

## 2019-08-05 ENCOUNTER — OFFICE VISIT (OUTPATIENT)
Dept: FAMILY MEDICINE | Facility: OTHER | Age: 54
End: 2019-08-05
Attending: FAMILY MEDICINE
Payer: COMMERCIAL

## 2019-08-05 VITALS
DIASTOLIC BLOOD PRESSURE: 100 MMHG | HEIGHT: 62 IN | TEMPERATURE: 98.3 F | OXYGEN SATURATION: 96 % | WEIGHT: 231.4 LBS | BODY MASS INDEX: 42.58 KG/M2 | SYSTOLIC BLOOD PRESSURE: 146 MMHG

## 2019-08-05 DIAGNOSIS — F41.9 ANXIETY: ICD-10-CM

## 2019-08-05 DIAGNOSIS — E78.5 HYPERLIPIDEMIA, UNSPECIFIED HYPERLIPIDEMIA TYPE: ICD-10-CM

## 2019-08-05 DIAGNOSIS — I10 ESSENTIAL HYPERTENSION, BENIGN: ICD-10-CM

## 2019-08-05 DIAGNOSIS — Z00.00 ROUTINE GENERAL MEDICAL EXAMINATION AT A HEALTH CARE FACILITY: Primary | ICD-10-CM

## 2019-08-05 PROCEDURE — 99396 PREV VISIT EST AGE 40-64: CPT | Performed by: FAMILY MEDICINE

## 2019-08-05 RX ORDER — LISINOPRIL AND HYDROCHLOROTHIAZIDE 20; 25 MG/1; MG/1
1 TABLET ORAL DAILY
Qty: 30 TABLET | Refills: 1 | Status: SHIPPED | OUTPATIENT
Start: 2019-08-05 | End: 2019-09-27

## 2019-08-05 RX ORDER — BUSPIRONE HYDROCHLORIDE 10 MG/1
10 TABLET ORAL 2 TIMES DAILY PRN
Qty: 60 TABLET | Refills: 1 | Status: SHIPPED | OUTPATIENT
Start: 2019-08-05 | End: 2020-11-22

## 2019-08-05 ASSESSMENT — PATIENT HEALTH QUESTIONNAIRE - PHQ9: SUM OF ALL RESPONSES TO PHQ QUESTIONS 1-9: 3

## 2019-08-05 ASSESSMENT — ANXIETY QUESTIONNAIRES
6. BECOMING EASILY ANNOYED OR IRRITABLE: SEVERAL DAYS
GAD7 TOTAL SCORE: 4
2. NOT BEING ABLE TO STOP OR CONTROL WORRYING: SEVERAL DAYS
7. FEELING AFRAID AS IF SOMETHING AWFUL MIGHT HAPPEN: NOT AT ALL
4. TROUBLE RELAXING: NOT AT ALL
3. WORRYING TOO MUCH ABOUT DIFFERENT THINGS: NOT AT ALL
1. FEELING NERVOUS, ANXIOUS, OR ON EDGE: MORE THAN HALF THE DAYS
IF YOU CHECKED OFF ANY PROBLEMS ON THIS QUESTIONNAIRE, HOW DIFFICULT HAVE THESE PROBLEMS MADE IT FOR YOU TO DO YOUR WORK, TAKE CARE OF THINGS AT HOME, OR GET ALONG WITH OTHER PEOPLE: SOMEWHAT DIFFICULT
5. BEING SO RESTLESS THAT IT IS HARD TO SIT STILL: NOT AT ALL

## 2019-08-05 ASSESSMENT — MIFFLIN-ST. JEOR: SCORE: 1606.84

## 2019-08-05 ASSESSMENT — PAIN SCALES - GENERAL: PAINLEVEL: NO PAIN (0)

## 2019-08-05 NOTE — NURSING NOTE
"Chief Complaint   Patient presents with     Physical       Initial BP (!) 142/90 (BP Location: Left arm, Patient Position: Sitting, Cuff Size: Adult Large)   Temp 98.3  F (36.8  C) (Tympanic)   Ht 1.581 m (5' 2.25\")   Wt 105 kg (231 lb 6.4 oz)   SpO2 96%   BMI 41.98 kg/m   Estimated body mass index is 41.98 kg/m  as calculated from the following:    Height as of this encounter: 1.581 m (5' 2.25\").    Weight as of this encounter: 105 kg (231 lb 6.4 oz).  Medication Reconciliation: complete   Michelle Delgado LPN  "

## 2019-08-06 ASSESSMENT — ANXIETY QUESTIONNAIRES: GAD7 TOTAL SCORE: 4

## 2019-08-07 ENCOUNTER — ANCILLARY PROCEDURE (OUTPATIENT)
Dept: MAMMOGRAPHY | Facility: OTHER | Age: 54
End: 2019-08-07
Attending: FAMILY MEDICINE
Payer: COMMERCIAL

## 2019-08-07 DIAGNOSIS — I10 ESSENTIAL HYPERTENSION, BENIGN: ICD-10-CM

## 2019-08-07 DIAGNOSIS — Z00.00 ROUTINE GENERAL MEDICAL EXAMINATION AT A HEALTH CARE FACILITY: ICD-10-CM

## 2019-08-07 DIAGNOSIS — E78.5 HYPERLIPIDEMIA, UNSPECIFIED HYPERLIPIDEMIA TYPE: ICD-10-CM

## 2019-08-07 DIAGNOSIS — Z12.31 SCREENING MAMMOGRAM, ENCOUNTER FOR: ICD-10-CM

## 2019-08-07 LAB
ALBUMIN SERPL-MCNC: 4 G/DL (ref 3.4–5)
ALP SERPL-CCNC: 78 U/L (ref 40–150)
ALT SERPL W P-5'-P-CCNC: 81 U/L (ref 0–50)
ANION GAP SERPL CALCULATED.3IONS-SCNC: 6 MMOL/L (ref 3–14)
AST SERPL W P-5'-P-CCNC: 60 U/L (ref 0–45)
BASOPHILS # BLD AUTO: 0 10E9/L (ref 0–0.2)
BASOPHILS NFR BLD AUTO: 0.4 %
BILIRUB SERPL-MCNC: 0.4 MG/DL (ref 0.2–1.3)
BUN SERPL-MCNC: 14 MG/DL (ref 7–30)
CALCIUM SERPL-MCNC: 9 MG/DL (ref 8.5–10.1)
CHLORIDE SERPL-SCNC: 108 MMOL/L (ref 94–109)
CHOLEST SERPL-MCNC: 191 MG/DL
CO2 SERPL-SCNC: 27 MMOL/L (ref 20–32)
CREAT SERPL-MCNC: 0.74 MG/DL (ref 0.52–1.04)
DIFFERENTIAL METHOD BLD: NORMAL
EOSINOPHIL # BLD AUTO: 0.2 10E9/L (ref 0–0.7)
EOSINOPHIL NFR BLD AUTO: 1.8 %
ERYTHROCYTE [DISTWIDTH] IN BLOOD BY AUTOMATED COUNT: 13.2 % (ref 10–15)
GFR SERPL CREATININE-BSD FRML MDRD: >90 ML/MIN/{1.73_M2}
GLUCOSE SERPL-MCNC: 94 MG/DL (ref 70–99)
HCT VFR BLD AUTO: 40.3 % (ref 35–47)
HDLC SERPL-MCNC: 54 MG/DL
HGB BLD-MCNC: 13.8 G/DL (ref 11.7–15.7)
IMM GRANULOCYTES # BLD: 0 10E9/L (ref 0–0.4)
IMM GRANULOCYTES NFR BLD: 0.3 %
LDLC SERPL CALC-MCNC: 101 MG/DL
LYMPHOCYTES # BLD AUTO: 2.2 10E9/L (ref 0.8–5.3)
LYMPHOCYTES NFR BLD AUTO: 24.6 %
MCH RBC QN AUTO: 32.1 PG (ref 26.5–33)
MCHC RBC AUTO-ENTMCNC: 34.2 G/DL (ref 31.5–36.5)
MCV RBC AUTO: 94 FL (ref 78–100)
MONOCYTES # BLD AUTO: 0.7 10E9/L (ref 0–1.3)
MONOCYTES NFR BLD AUTO: 7.9 %
NEUTROPHILS # BLD AUTO: 5.9 10E9/L (ref 1.6–8.3)
NEUTROPHILS NFR BLD AUTO: 65 %
NONHDLC SERPL-MCNC: 137 MG/DL
NRBC # BLD AUTO: 0 10*3/UL
NRBC BLD AUTO-RTO: 0 /100
PLATELET # BLD AUTO: 258 10E9/L (ref 150–450)
POTASSIUM SERPL-SCNC: 4.2 MMOL/L (ref 3.4–5.3)
PROT SERPL-MCNC: 7.4 G/DL (ref 6.8–8.8)
RBC # BLD AUTO: 4.3 10E12/L (ref 3.8–5.2)
SODIUM SERPL-SCNC: 141 MMOL/L (ref 133–144)
TRIGL SERPL-MCNC: 182 MG/DL
WBC # BLD AUTO: 9.1 10E9/L (ref 4–11)

## 2019-08-07 PROCEDURE — 77067 SCR MAMMO BI INCL CAD: CPT | Mod: TC

## 2019-08-07 PROCEDURE — 85025 COMPLETE CBC W/AUTO DIFF WBC: CPT | Performed by: FAMILY MEDICINE

## 2019-08-07 PROCEDURE — 80053 COMPREHEN METABOLIC PANEL: CPT | Performed by: FAMILY MEDICINE

## 2019-08-07 PROCEDURE — 80061 LIPID PANEL: CPT | Performed by: FAMILY MEDICINE

## 2019-08-07 PROCEDURE — 77063 BREAST TOMOSYNTHESIS BI: CPT | Mod: TC

## 2019-08-07 PROCEDURE — 36415 COLL VENOUS BLD VENIPUNCTURE: CPT | Performed by: FAMILY MEDICINE

## 2019-08-09 ENCOUNTER — ALLIED HEALTH/NURSE VISIT (OUTPATIENT)
Dept: FAMILY MEDICINE | Facility: OTHER | Age: 54
End: 2019-08-09
Attending: FAMILY MEDICINE
Payer: COMMERCIAL

## 2019-08-09 DIAGNOSIS — Z23 NEED FOR VACCINATION: Primary | ICD-10-CM

## 2019-08-09 PROCEDURE — 90750 HZV VACC RECOMBINANT IM: CPT

## 2019-08-09 PROCEDURE — 90471 IMMUNIZATION ADMIN: CPT

## 2019-08-27 DIAGNOSIS — E78.5 HYPERLIPIDEMIA, UNSPECIFIED HYPERLIPIDEMIA TYPE: ICD-10-CM

## 2019-08-27 DIAGNOSIS — I10 ESSENTIAL HYPERTENSION, BENIGN: ICD-10-CM

## 2019-08-28 NOTE — TELEPHONE ENCOUNTER
Lisinopril-hydrochlorothiazide       Last Written Prescription Date:  8/5/2019  Last Fill Quantity: 30,   # refills: 1    Zocor       Last Written Prescription Date:  7/30/2019  Last Fill Quantity: 30,   # refills: 0  Last Office Visit: 8/5/2019  Future Office visit:

## 2019-08-29 RX ORDER — SIMVASTATIN 20 MG
TABLET ORAL
Qty: 30 TABLET | Refills: 11 | Status: SHIPPED | OUTPATIENT
Start: 2019-08-29 | End: 2020-01-13

## 2019-08-29 RX ORDER — LISINOPRIL AND HYDROCHLOROTHIAZIDE 12.5; 2 MG/1; MG/1
1 TABLET ORAL DAILY
Qty: 30 TABLET | Refills: 8 | Status: SHIPPED | OUTPATIENT
Start: 2019-08-29 | End: 2020-01-13

## 2019-09-27 DIAGNOSIS — I10 ESSENTIAL HYPERTENSION, BENIGN: ICD-10-CM

## 2019-10-01 RX ORDER — LISINOPRIL AND HYDROCHLOROTHIAZIDE 20; 25 MG/1; MG/1
1 TABLET ORAL DAILY
Qty: 30 TABLET | Refills: 0 | Status: SHIPPED | OUTPATIENT
Start: 2019-10-01 | End: 2019-10-31

## 2019-11-04 ENCOUNTER — OFFICE VISIT (OUTPATIENT)
Dept: CHIROPRACTIC MEDICINE | Facility: OTHER | Age: 54
End: 2019-11-04
Attending: CHIROPRACTOR
Payer: COMMERCIAL

## 2019-11-04 DIAGNOSIS — M99.02 SEGMENTAL AND SOMATIC DYSFUNCTION OF THORACIC REGION: ICD-10-CM

## 2019-11-04 DIAGNOSIS — M54.50 ACUTE BILATERAL LOW BACK PAIN WITHOUT SCIATICA: ICD-10-CM

## 2019-11-04 DIAGNOSIS — M99.03 SEGMENTAL AND SOMATIC DYSFUNCTION OF LUMBAR REGION: Primary | ICD-10-CM

## 2019-11-04 PROCEDURE — 98940 CHIROPRACT MANJ 1-2 REGIONS: CPT | Mod: AT | Performed by: CHIROPRACTOR

## 2019-11-04 PROCEDURE — 99202 OFFICE O/P NEW SF 15 MIN: CPT | Mod: 25 | Performed by: CHIROPRACTOR

## 2019-11-05 NOTE — PROGRESS NOTES
Subjective Finding:    Chief compalint: Patient presents with:  Back Pain  , Pain Scale: 6/10, Intensity: sharp, Duration: 3 weeks, Radiating:  no.    Date of injury:     Activities that the pain restricts:   Home/household/hobbies/social activities: yes.  Work duties: yes.  Sleep: yes.  Makes symptoms better: rest.  Makes symptoms worse: activity.  Have you seen anyone else for the symptoms? No.  Work related: no.  Automobile related injury: no.    Objective and Assessment:    Posture Analysis:   High shoulder: .  Head tilt: .  High iliac crest: .  Head carriage: neutral.  Thoracic Kyphosis: neutral.  Lumbar Lordosis: forward.    Lumbar Range of Motion: extension decreased.  Cervical Range of Motion: .  Thoracic Range of Motion: .  Extremity Range of Motion: .    Palpation:   Quad lumb: bilateral, referred pain: no    Segmental dysfunction pre-treatment and treatment area: T4, L4 and L5.    Assessment post-treatment:  Cervical: .  Thoracic: ROM increased.  Lumbar: ROM increased.    Comments: .      Complicating Factors: .    Procedure(s):  CMT:  [id number] Chiropractic manipulative treatment 1-2 regions performed   Thoracic: Diversified, See above for level, Prone and Lumbar: Diversified, See above for level, Side posture    Modalities:  None performed this visit    Therapeutic procedures:  None    Plan:  Treatment plan: PRN.  Instructed patient: walk 10 minutes.  Short term goals: reduce pain.  Long term goals: restore normal function.  Prognosis: excellent.

## 2019-11-14 NOTE — PROGRESS NOTES
Subjective     Rosalie Stewart is a 54 year old female who presents to clinic today for the following health issues:    Flu shot and 2nd Shingrix vaccines due.    HPI   Hypertension Follow-up    Do you check your blood pressure regularly outside of the clinic? No     Are you following a low salt diet? Yes    Are your blood pressures ever more than 140 on the top number (systolic) OR more   than 90 on the bottom number (diastolic), for example 140/90? Yes     Had zestoretic increased from 10/12.5 to 20/12.5 mg at physical in 8/2019.    How many servings of fruits and vegetables do you eat daily?  2-3    On average, how many sweetened beverages do you drink each day (soda, juice, sweet tea, etc)?   0    How many days per week do you miss taking your medication? 0     Anxiety Follow-Up    How are you doing with your anxiety since your last visit? Improved     Are you having other symptoms that might be associated with anxiety? No    Have you had a significant life event? No     Are you feeling depressed? No    Do you have any concerns with your use of alcohol or other drugs? No     Had buspar added at physical 8/2019; uses only prn - has used a few times - did work    Job stress has reduced recently    Anxiety is high today; buying a car today and has not done this before on her own    Social History     Tobacco Use     Smoking status: Never Smoker     Smokeless tobacco: Never Used   Substance Use Topics     Alcohol use: Yes     Comment: occasionally     Drug use: No     VARSHA-7 SCORE 1/17/2018 5/24/2018 8/5/2019   Total Score 6 3 4     PHQ 1/17/2018 5/24/2018 8/5/2019   PHQ-9 Total Score 1 2 3   Q9: Thoughts of better off dead/self-harm past 2 weeks Not at all Not at all Not at all     GI symptoms - abdominal pain      Duration: intermittent over 2 months or so    Description (location/character/radiation): frequent/urgent stool; gas pain; diffuse - non-focal; cramping; resolves after a loose stool; non-bloody; no  vomiting; no fever    Intensity:  moderate    Accompanying signs and symptoms: as above    History (similar episodes/previous evaluation): None    Precipitating or alleviating factors: varies    Therapies tried and outcome: dietary changes    Frequent stools, urgency; worse with codey - (severe diarrhea), clam chowder, but sometimes with salad too    Limit dairy; son lactose intolerant              Current Outpatient Medications   Medication     busPIRone (BUSPAR) 10 MG tablet     Calcium Citrate-Vitamin D (CALCIUM + D PO)     cetirizine (ZYRTEC) 10 MG tablet     fish oil-omega-3 fatty acids 1000 MG capsule     glucosamine 500 MG CAPS     hydrOXYzine (VISTARIL) 25 MG capsule     lisinopril-hydrochlorothiazide (PRINZIDE/ZESTORETIC) 20-12.5 MG tablet     lisinopril-hydrochlorothiazide (PRINZIDE/ZESTORETIC) 20-25 MG tablet     multivitamin, therapeutic with minerals (MULTI-VITAMIN) TABS tablet     Naproxen Sodium (ALEVE PO)     simvastatin (ZOCOR) 20 MG tablet     No current facility-administered medications for this visit.        Patient Active Problem List   Diagnosis     Benign essential hypertension     Anxiety     Obesity, Class II, BMI 35-39.9, with comorbidity     Hyperlipidemia, unspecified hyperlipidemia type     Morbid obesity (H)     Past Surgical History:   Procedure Laterality Date      tubal ligation Bilateral           SECTION  /    2     COLONOSCOPY      negative     ENDOSCOPY      negative       Social History     Tobacco Use     Smoking status: Never Smoker     Smokeless tobacco: Never Used   Substance Use Topics     Alcohol use: Yes     Comment: occasionally     Family History   Family history unknown: Yes             Reviewed and updated as needed this visit by Provider  Tobacco  Allergies  Meds  Med Hx  Surg Hx  Fam Hx  Soc Hx        Review of Systems   ROS COMP: Constitutional, HEENT, cardiovascular, pulmonary, gi and gu systems are negative, except as  "otherwise noted.      Objective    BP (!) 138/96 (BP Location: Left arm, Patient Position: Sitting, Cuff Size: Adult Regular)   Pulse 104   Temp 98.7  F (37.1  C) (Tympanic)   Resp 20   Ht 1.581 m (5' 2.25\")   Wt 108.9 kg (240 lb)   SpO2 95%   BMI 43.54 kg/m    Body mass index is 43.54 kg/m .  Physical Exam   GENERAL: alert, no distress and obese  NECK: no adenopathy, no asymmetry, masses, or scars and thyroid normal to palpation  RESP: lungs clear to auscultation - no rales, rhonchi or wheezes  CV: regular rate and rhythm, normal S1 S2, no S3 or S4, no murmur, click or rub, no peripheral edema and peripheral pulses strong  ABDOMEN: soft, nontender, no hepatosplenomegaly, no masses and bowel sounds normal  MS: no gross musculoskeletal defects noted, no edema  PSYCH: mentation appears normal, affect normal/bright    Diagnostic Test Results:  Labs reviewed in Epic  Additional labs pending        Assessment & Plan       ICD-10-CM    1. Need for prophylactic vaccination and inoculation against influenza Z23 Vaccine Administration, Initial [62120]   2. Benign essential hypertension I10    3. Morbid obesity (H) E66.01    4. Anxiety F41.9    5. Abdominal pain, unspecified abdominal location R10.9 US Abdomen Limited     CBC with platelets and differential     Comprehensive metabolic panel     CRP inflammation   6. Loose stools R19.5 US Abdomen Limited     CBC with platelets and differential     Comprehensive metabolic panel     CRP inflammation        BMI:   Estimated body mass index is 43.54 kg/m  as calculated from the following:    Height as of this encounter: 1.581 m (5' 2.25\").    Weight as of this encounter: 108.9 kg (240 lb).   Weight management plan: Discussed healthy diet and exercise guidelines        Patient Instructions   Ultrasound to evaluate gallbladder.  May need HIDA scan.  Will call with lab results.  Consider dairy free for 1-2 weeks.  Consider fiber supplement such as Citrucel/Metamucil.  Avoid " rich foods - greasy food, etc.  Continue buspar as needed.  Recheck BP with nurse visit on day of your ultrasound.      No follow-ups on file.    Anne Morrison MD  [facility] [place]

## 2019-11-18 ENCOUNTER — OFFICE VISIT (OUTPATIENT)
Dept: FAMILY MEDICINE | Facility: OTHER | Age: 54
End: 2019-11-18
Attending: FAMILY MEDICINE
Payer: COMMERCIAL

## 2019-11-18 VITALS
HEART RATE: 104 BPM | WEIGHT: 240 LBS | SYSTOLIC BLOOD PRESSURE: 138 MMHG | HEIGHT: 62 IN | DIASTOLIC BLOOD PRESSURE: 96 MMHG | OXYGEN SATURATION: 95 % | BODY MASS INDEX: 44.16 KG/M2 | TEMPERATURE: 98.7 F | RESPIRATION RATE: 20 BRPM

## 2019-11-18 DIAGNOSIS — I10 BENIGN ESSENTIAL HYPERTENSION: ICD-10-CM

## 2019-11-18 DIAGNOSIS — Z23 NEED FOR PROPHYLACTIC VACCINATION AND INOCULATION AGAINST INFLUENZA: Primary | ICD-10-CM

## 2019-11-18 DIAGNOSIS — E66.01 MORBID OBESITY (H): ICD-10-CM

## 2019-11-18 DIAGNOSIS — R10.9 ABDOMINAL PAIN, UNSPECIFIED ABDOMINAL LOCATION: ICD-10-CM

## 2019-11-18 DIAGNOSIS — F41.9 ANXIETY: ICD-10-CM

## 2019-11-18 DIAGNOSIS — R19.5 LOOSE STOOLS: ICD-10-CM

## 2019-11-18 LAB
ALBUMIN SERPL-MCNC: 4.4 G/DL (ref 3.4–5)
ALP SERPL-CCNC: 74 U/L (ref 40–150)
ALT SERPL W P-5'-P-CCNC: 83 U/L (ref 0–50)
ANION GAP SERPL CALCULATED.3IONS-SCNC: 6 MMOL/L (ref 3–14)
AST SERPL W P-5'-P-CCNC: 56 U/L (ref 0–45)
BASOPHILS # BLD AUTO: 0 10E9/L (ref 0–0.2)
BASOPHILS NFR BLD AUTO: 0.4 %
BILIRUB SERPL-MCNC: 0.5 MG/DL (ref 0.2–1.3)
BUN SERPL-MCNC: 13 MG/DL (ref 7–30)
CALCIUM SERPL-MCNC: 10.1 MG/DL (ref 8.5–10.1)
CHLORIDE SERPL-SCNC: 102 MMOL/L (ref 94–109)
CO2 SERPL-SCNC: 29 MMOL/L (ref 20–32)
CREAT SERPL-MCNC: 0.75 MG/DL (ref 0.52–1.04)
CRP SERPL-MCNC: 4.2 MG/L (ref 0–8)
DIFFERENTIAL METHOD BLD: NORMAL
EOSINOPHIL # BLD AUTO: 0.1 10E9/L (ref 0–0.7)
EOSINOPHIL NFR BLD AUTO: 1.5 %
ERYTHROCYTE [DISTWIDTH] IN BLOOD BY AUTOMATED COUNT: 12.7 % (ref 10–15)
GFR SERPL CREATININE-BSD FRML MDRD: 89 ML/MIN/{1.73_M2}
GLUCOSE SERPL-MCNC: 90 MG/DL (ref 70–99)
HCT VFR BLD AUTO: 41.1 % (ref 35–47)
HGB BLD-MCNC: 14.1 G/DL (ref 11.7–15.7)
IMM GRANULOCYTES # BLD: 0 10E9/L (ref 0–0.4)
IMM GRANULOCYTES NFR BLD: 0.4 %
LYMPHOCYTES # BLD AUTO: 1.9 10E9/L (ref 0.8–5.3)
LYMPHOCYTES NFR BLD AUTO: 28.4 %
MCH RBC QN AUTO: 32 PG (ref 26.5–33)
MCHC RBC AUTO-ENTMCNC: 34.3 G/DL (ref 31.5–36.5)
MCV RBC AUTO: 93 FL (ref 78–100)
MONOCYTES # BLD AUTO: 0.6 10E9/L (ref 0–1.3)
MONOCYTES NFR BLD AUTO: 9.5 %
NEUTROPHILS # BLD AUTO: 4 10E9/L (ref 1.6–8.3)
NEUTROPHILS NFR BLD AUTO: 59.8 %
NRBC # BLD AUTO: 0 10*3/UL
NRBC BLD AUTO-RTO: 0 /100
PLATELET # BLD AUTO: 270 10E9/L (ref 150–450)
POTASSIUM SERPL-SCNC: 3.6 MMOL/L (ref 3.4–5.3)
PROT SERPL-MCNC: 7.7 G/DL (ref 6.8–8.8)
RBC # BLD AUTO: 4.4 10E12/L (ref 3.8–5.2)
SODIUM SERPL-SCNC: 137 MMOL/L (ref 133–144)
WBC # BLD AUTO: 6.8 10E9/L (ref 4–11)

## 2019-11-18 PROCEDURE — 90471 IMMUNIZATION ADMIN: CPT | Performed by: FAMILY MEDICINE

## 2019-11-18 PROCEDURE — 90750 HZV VACC RECOMBINANT IM: CPT | Performed by: FAMILY MEDICINE

## 2019-11-18 PROCEDURE — 86140 C-REACTIVE PROTEIN: CPT | Performed by: FAMILY MEDICINE

## 2019-11-18 PROCEDURE — 99214 OFFICE O/P EST MOD 30 MIN: CPT | Mod: 25 | Performed by: FAMILY MEDICINE

## 2019-11-18 PROCEDURE — 90472 IMMUNIZATION ADMIN EACH ADD: CPT | Performed by: FAMILY MEDICINE

## 2019-11-18 PROCEDURE — 90682 RIV4 VACC RECOMBINANT DNA IM: CPT | Performed by: FAMILY MEDICINE

## 2019-11-18 PROCEDURE — 36415 COLL VENOUS BLD VENIPUNCTURE: CPT | Performed by: FAMILY MEDICINE

## 2019-11-18 PROCEDURE — 85025 COMPLETE CBC W/AUTO DIFF WBC: CPT | Performed by: FAMILY MEDICINE

## 2019-11-18 PROCEDURE — 80053 COMPREHEN METABOLIC PANEL: CPT | Performed by: FAMILY MEDICINE

## 2019-11-18 ASSESSMENT — PAIN SCALES - GENERAL: PAINLEVEL: NO PAIN (0)

## 2019-11-18 ASSESSMENT — MIFFLIN-ST. JEOR: SCORE: 1645.85

## 2019-11-18 NOTE — PATIENT INSTRUCTIONS
Ultrasound to evaluate gallbladder.  May need HIDA scan.  Will call with lab results.  Consider dairy free for 1-2 weeks.  Consider fiber supplement such as Citrucel/Metamucil.  Avoid rich foods - greasy food, etc.  Continue buspar as needed.  Recheck BP with nurse visit on day of your ultrasound.

## 2019-11-18 NOTE — NURSING NOTE
"Chief Complaint   Patient presents with     Hypertension     Anxiety       Initial BP (!) 144/100 (BP Location: Left arm, Patient Position: Sitting, Cuff Size: Adult Regular)   Pulse 104   Temp 98.7  F (37.1  C) (Tympanic)   Resp 20   Ht 1.581 m (5' 2.25\")   Wt 108.9 kg (240 lb)   SpO2 95%   BMI 43.54 kg/m   Estimated body mass index is 43.54 kg/m  as calculated from the following:    Height as of this encounter: 1.581 m (5' 2.25\").    Weight as of this encounter: 108.9 kg (240 lb).  Medication Reconciliation: complete  Janiya Harper LPN  "

## 2019-11-22 ENCOUNTER — ALLIED HEALTH/NURSE VISIT (OUTPATIENT)
Dept: FAMILY MEDICINE | Facility: OTHER | Age: 54
End: 2019-11-22
Attending: FAMILY MEDICINE
Payer: COMMERCIAL

## 2019-11-22 ENCOUNTER — HOSPITAL ENCOUNTER (OUTPATIENT)
Dept: ULTRASOUND IMAGING | Facility: HOSPITAL | Age: 54
Discharge: HOME | End: 2019-11-22
Attending: FAMILY MEDICINE | Admitting: FAMILY MEDICINE
Payer: COMMERCIAL

## 2019-11-22 VITALS — DIASTOLIC BLOOD PRESSURE: 84 MMHG | SYSTOLIC BLOOD PRESSURE: 138 MMHG

## 2019-11-22 DIAGNOSIS — I10 BENIGN ESSENTIAL HYPERTENSION: Primary | ICD-10-CM

## 2019-11-22 DIAGNOSIS — R19.5 LOOSE STOOLS: ICD-10-CM

## 2019-11-22 DIAGNOSIS — R10.9 ABDOMINAL PAIN, UNSPECIFIED ABDOMINAL LOCATION: ICD-10-CM

## 2019-11-22 PROCEDURE — 99207 ZZC NO CHARGE NURSE ONLY: CPT

## 2019-11-22 PROCEDURE — 76705 ECHO EXAM OF ABDOMEN: CPT | Mod: TC

## 2019-12-10 ENCOUNTER — TELEPHONE (OUTPATIENT)
Dept: FAMILY MEDICINE | Facility: OTHER | Age: 54
End: 2019-12-10

## 2019-12-10 DIAGNOSIS — R10.9 ABDOMINAL PAIN, UNSPECIFIED ABDOMINAL LOCATION: Primary | ICD-10-CM

## 2019-12-10 NOTE — TELEPHONE ENCOUNTER
Pt calling with up-date    Had gallbladder U/S, no stones, told to adjust diet, had no dairy  Still having a lot of pain  On and off, increasing    Pt said Hitascan was mentioned if no relief with diet.    Please call pt.    Lakshmi Garcia LPN

## 2019-12-13 ENCOUNTER — TELEPHONE (OUTPATIENT)
Dept: NUCLEAR MEDICINE | Facility: HOSPITAL | Age: 54
End: 2019-12-13

## 2019-12-13 NOTE — TELEPHONE ENCOUNTER
Called patient in regards to her Nuclear Medicine HIDA exam scheduled for 12-27-19 at 1200.    Rosalie is available to move her appointment forward to 12-16-19 at 1200.    Discussed  -Exam prep  -When and where to register  -Appointment length variation.

## 2019-12-16 ENCOUNTER — HOSPITAL ENCOUNTER (OUTPATIENT)
Dept: NUCLEAR MEDICINE | Facility: HOSPITAL | Age: 54
Discharge: HOME | End: 2019-12-16
Attending: FAMILY MEDICINE | Admitting: FAMILY MEDICINE
Payer: COMMERCIAL

## 2019-12-16 ENCOUNTER — HOSPITAL ENCOUNTER (OUTPATIENT)
Dept: NUCLEAR MEDICINE | Facility: HOSPITAL | Age: 54
End: 2019-12-16
Attending: FAMILY MEDICINE
Payer: COMMERCIAL

## 2019-12-16 DIAGNOSIS — R10.9 ABDOMINAL PAIN, UNSPECIFIED ABDOMINAL LOCATION: ICD-10-CM

## 2019-12-16 PROCEDURE — 78227 HEPATOBIL SYST IMAGE W/DRUG: CPT | Mod: TC

## 2019-12-16 PROCEDURE — A9537 TC99M MEBROFENIN: HCPCS | Performed by: RADIOLOGY

## 2019-12-16 PROCEDURE — 34300033 ZZH RX 343: Performed by: RADIOLOGY

## 2019-12-16 RX ORDER — KIT FOR THE PREPARATION OF TECHNETIUM TC 99M MEBROFENIN 45 MG/10ML
5 INJECTION, POWDER, LYOPHILIZED, FOR SOLUTION INTRAVENOUS ONCE
Status: COMPLETED | OUTPATIENT
Start: 2019-12-16 | End: 2019-12-16

## 2019-12-16 RX ADMIN — MEBROFENIN 5 MILLICURIE: 45 INJECTION, POWDER, LYOPHILIZED, FOR SOLUTION INTRAVENOUS at 12:16

## 2019-12-16 NOTE — PROCEDURES
Patient given 16 oz of chocolate boost (28 grams of fat) by mouth for a nuclear medicine HIDA scan. No complications.

## 2019-12-17 ENCOUNTER — MYC MEDICAL ADVICE (OUTPATIENT)
Dept: FAMILY MEDICINE | Facility: OTHER | Age: 54
End: 2019-12-17

## 2020-01-10 DIAGNOSIS — I10 ESSENTIAL HYPERTENSION, BENIGN: ICD-10-CM

## 2020-01-13 ENCOUNTER — MYC MEDICAL ADVICE (OUTPATIENT)
Dept: FAMILY MEDICINE | Facility: OTHER | Age: 55
End: 2020-01-13

## 2020-01-13 DIAGNOSIS — I10 ESSENTIAL HYPERTENSION, BENIGN: ICD-10-CM

## 2020-01-13 DIAGNOSIS — E78.5 HYPERLIPIDEMIA, UNSPECIFIED HYPERLIPIDEMIA TYPE: ICD-10-CM

## 2020-01-13 RX ORDER — LISINOPRIL AND HYDROCHLOROTHIAZIDE 20; 25 MG/1; MG/1
1 TABLET ORAL DAILY
Qty: 90 TABLET | Refills: 1 | Status: SHIPPED | OUTPATIENT
Start: 2020-01-13 | End: 2020-01-13

## 2020-01-13 RX ORDER — SIMVASTATIN 20 MG
20 TABLET ORAL AT BEDTIME
Qty: 90 TABLET | Refills: 1 | Status: SHIPPED | OUTPATIENT
Start: 2020-01-13 | End: 2020-08-06

## 2020-01-13 RX ORDER — SIMVASTATIN 20 MG
20 TABLET ORAL AT BEDTIME
Qty: 90 TABLET | Refills: 1 | Status: SHIPPED | OUTPATIENT
Start: 2020-01-13 | End: 2020-01-13

## 2020-01-13 RX ORDER — LISINOPRIL AND HYDROCHLOROTHIAZIDE 20; 25 MG/1; MG/1
TABLET ORAL
Qty: 30 TABLET | Refills: 0 | OUTPATIENT
Start: 2020-01-13

## 2020-01-13 RX ORDER — LISINOPRIL AND HYDROCHLOROTHIAZIDE 20; 25 MG/1; MG/1
1 TABLET ORAL DAILY
Qty: 90 TABLET | Refills: 1 | Status: SHIPPED | OUTPATIENT
Start: 2020-01-13 | End: 2020-10-07

## 2020-01-13 NOTE — TELEPHONE ENCOUNTER
Spoke with pt. She would like these 2 that are pending filled. Also shes wondering if she can get a 90 day supply of the simvastatin. Please advise

## 2020-08-04 DIAGNOSIS — E78.5 HYPERLIPIDEMIA, UNSPECIFIED HYPERLIPIDEMIA TYPE: ICD-10-CM

## 2020-08-05 NOTE — TELEPHONE ENCOUNTER
Simvastatin      Last Written Prescription Date:  1/13/20  Last Fill Quantity: 90,   # refills: 1  Last Office Visit: 11/18/19  Future Office visit:       Routing refill request to provider for review/approval because:

## 2020-08-06 RX ORDER — SIMVASTATIN 20 MG
TABLET ORAL
Qty: 90 TABLET | Refills: 1 | Status: SHIPPED | OUTPATIENT
Start: 2020-08-06 | End: 2021-02-04

## 2020-11-22 ENCOUNTER — ANESTHESIA (OUTPATIENT)
Dept: SURGERY | Facility: HOSPITAL | Age: 55
End: 2020-11-22
Payer: COMMERCIAL

## 2020-11-22 ENCOUNTER — HOSPITAL ENCOUNTER (OUTPATIENT)
Facility: HOSPITAL | Age: 55
Discharge: HOME | End: 2020-11-23
Attending: EMERGENCY MEDICINE | Admitting: EMERGENCY MEDICINE
Payer: COMMERCIAL

## 2020-11-22 ENCOUNTER — APPOINTMENT (OUTPATIENT)
Dept: CT IMAGING | Facility: HOSPITAL | Age: 55
End: 2020-11-22
Attending: EMERGENCY MEDICINE
Payer: COMMERCIAL

## 2020-11-22 ENCOUNTER — ANESTHESIA EVENT (OUTPATIENT)
Dept: SURGERY | Facility: HOSPITAL | Age: 55
End: 2020-11-22
Payer: COMMERCIAL

## 2020-11-22 DIAGNOSIS — R10.84 ABDOMINAL PAIN, GENERALIZED: ICD-10-CM

## 2020-11-22 DIAGNOSIS — R19.7 DIARRHEA, UNSPECIFIED TYPE: ICD-10-CM

## 2020-11-22 DIAGNOSIS — K35.30 ACUTE APPENDICITIS WITH LOCALIZED PERITONITIS WITHOUT ABSCESS, UNSPECIFIED WHETHER GANGRENE PRESENT, UNSPECIFIED WHETHER PERFORATION PRESENT: Primary | ICD-10-CM

## 2020-11-22 LAB
ALBUMIN SERPL-MCNC: 4.3 G/DL (ref 3.4–5)
ALBUMIN UR-MCNC: 10 MG/DL
ALP SERPL-CCNC: 76 U/L (ref 40–150)
ALT SERPL W P-5'-P-CCNC: 61 U/L (ref 0–50)
ANION GAP SERPL CALCULATED.3IONS-SCNC: 8 MMOL/L (ref 3–14)
APPEARANCE UR: CLEAR
AST SERPL W P-5'-P-CCNC: 44 U/L (ref 0–45)
BACTERIA #/AREA URNS HPF: ABNORMAL /HPF
BASOPHILS # BLD AUTO: 0 10E9/L (ref 0–0.2)
BASOPHILS NFR BLD AUTO: 0.3 %
BILIRUB SERPL-MCNC: 0.7 MG/DL (ref 0.2–1.3)
BILIRUB UR QL STRIP: NEGATIVE
BUN SERPL-MCNC: 10 MG/DL (ref 7–30)
CALCIUM SERPL-MCNC: 10 MG/DL (ref 8.5–10.1)
CHLORIDE SERPL-SCNC: 98 MMOL/L (ref 94–109)
CO2 SERPL-SCNC: 27 MMOL/L (ref 20–32)
COLOR UR AUTO: ABNORMAL
CREAT SERPL-MCNC: 0.68 MG/DL (ref 0.52–1.04)
DIFFERENTIAL METHOD BLD: ABNORMAL
EOSINOPHIL # BLD AUTO: 0 10E9/L (ref 0–0.7)
EOSINOPHIL NFR BLD AUTO: 0.1 %
ERYTHROCYTE [DISTWIDTH] IN BLOOD BY AUTOMATED COUNT: 12.7 % (ref 10–15)
GFR SERPL CREATININE-BSD FRML MDRD: >90 ML/MIN/{1.73_M2}
GLUCOSE SERPL-MCNC: 124 MG/DL (ref 70–99)
GLUCOSE UR STRIP-MCNC: NEGATIVE MG/DL
HCG UR QL: NEGATIVE
HCT VFR BLD AUTO: 41.4 % (ref 35–47)
HGB BLD-MCNC: 14.6 G/DL (ref 11.7–15.7)
HGB UR QL STRIP: ABNORMAL
IMM GRANULOCYTES # BLD: 0.1 10E9/L (ref 0–0.4)
IMM GRANULOCYTES NFR BLD: 0.4 %
KETONES UR STRIP-MCNC: 80 MG/DL
LACTATE BLD-SCNC: 1.8 MMOL/L (ref 0.7–2)
LEUKOCYTE ESTERASE UR QL STRIP: NEGATIVE
LIPASE SERPL-CCNC: 54 U/L (ref 73–393)
LYMPHOCYTES # BLD AUTO: 1.3 10E9/L (ref 0.8–5.3)
LYMPHOCYTES NFR BLD AUTO: 9.3 %
MCH RBC QN AUTO: 32.1 PG (ref 26.5–33)
MCHC RBC AUTO-ENTMCNC: 35.3 G/DL (ref 31.5–36.5)
MCV RBC AUTO: 91 FL (ref 78–100)
MONOCYTES # BLD AUTO: 0.6 10E9/L (ref 0–1.3)
MONOCYTES NFR BLD AUTO: 4.6 %
MUCOUS THREADS #/AREA URNS LPF: PRESENT /LPF
NEUTROPHILS # BLD AUTO: 11.8 10E9/L (ref 1.6–8.3)
NEUTROPHILS NFR BLD AUTO: 85.3 %
NITRATE UR QL: NEGATIVE
NRBC # BLD AUTO: 0 10*3/UL
NRBC BLD AUTO-RTO: 0 /100
PH UR STRIP: 5.5 PH (ref 4.7–8)
PLATELET # BLD AUTO: 282 10E9/L (ref 150–450)
POTASSIUM SERPL-SCNC: 3.3 MMOL/L (ref 3.4–5.3)
PROT SERPL-MCNC: 8.2 G/DL (ref 6.8–8.8)
RBC # BLD AUTO: 4.55 10E12/L (ref 3.8–5.2)
RBC #/AREA URNS AUTO: 20 /HPF (ref 0–2)
SODIUM SERPL-SCNC: 133 MMOL/L (ref 133–144)
SOURCE: ABNORMAL
SP GR UR STRIP: 1.03 (ref 1–1.03)
SQUAMOUS #/AREA URNS AUTO: 1 /HPF (ref 0–1)
UROBILINOGEN UR STRIP-MCNC: NORMAL MG/DL (ref 0–2)
WBC # BLD AUTO: 13.8 10E9/L (ref 4–11)
WBC #/AREA URNS AUTO: 1 /HPF (ref 0–5)

## 2020-11-22 PROCEDURE — 85025 COMPLETE CBC W/AUTO DIFF WBC: CPT | Performed by: EMERGENCY MEDICINE

## 2020-11-22 PROCEDURE — 250N000011 HC RX IP 250 OP 636: Performed by: FAMILY MEDICINE

## 2020-11-22 PROCEDURE — 81025 URINE PREGNANCY TEST: CPT | Performed by: EMERGENCY MEDICINE

## 2020-11-22 PROCEDURE — 83605 ASSAY OF LACTIC ACID: CPT | Performed by: EMERGENCY MEDICINE

## 2020-11-22 PROCEDURE — 81001 URINALYSIS AUTO W/SCOPE: CPT | Performed by: EMERGENCY MEDICINE

## 2020-11-22 PROCEDURE — 96374 THER/PROPH/DIAG INJ IV PUSH: CPT

## 2020-11-22 PROCEDURE — 74177 CT ABD & PELVIS W/CONTRAST: CPT

## 2020-11-22 PROCEDURE — 250N000011 HC RX IP 250 OP 636: Performed by: EMERGENCY MEDICINE

## 2020-11-22 PROCEDURE — 370N000002 HC ANESTHESIA TECHNICAL FEE, EACH ADDTL 15 MIN: Performed by: SURGERY

## 2020-11-22 PROCEDURE — 360N000020 HC SURGERY LEVEL 3 1ST 30 MIN: Performed by: SURGERY

## 2020-11-22 PROCEDURE — 88304 TISSUE EXAM BY PATHOLOGIST: CPT | Mod: TC | Performed by: SURGERY

## 2020-11-22 PROCEDURE — 250N000011 HC RX IP 250 OP 636: Performed by: NURSE ANESTHETIST, CERTIFIED REGISTERED

## 2020-11-22 PROCEDURE — 761N000003 HC RECOVERY PHASE 1 LEVEL 2 FIRST HR: Performed by: SURGERY

## 2020-11-22 PROCEDURE — 370N000001 HC ANESTHESIA TECHNICAL FEE, 1ST 30 MIN: Performed by: SURGERY

## 2020-11-22 PROCEDURE — 36415 COLL VENOUS BLD VENIPUNCTURE: CPT | Performed by: FAMILY MEDICINE

## 2020-11-22 PROCEDURE — 80053 COMPREHEN METABOLIC PANEL: CPT | Performed by: EMERGENCY MEDICINE

## 2020-11-22 PROCEDURE — 44970 LAPAROSCOPY APPENDECTOMY: CPT | Performed by: NURSE ANESTHETIST, CERTIFIED REGISTERED

## 2020-11-22 PROCEDURE — 272N000001 HC OR GENERAL SUPPLY STERILE: Performed by: SURGERY

## 2020-11-22 PROCEDURE — 96375 TX/PRO/DX INJ NEW DRUG ADDON: CPT

## 2020-11-22 PROCEDURE — 258N000003 HC RX IP 258 OP 636: Performed by: EMERGENCY MEDICINE

## 2020-11-22 PROCEDURE — 360N000021 HC SURGERY LEVEL 3 EA 15 ADDTL MIN: Performed by: SURGERY

## 2020-11-22 PROCEDURE — C9803 HOPD COVID-19 SPEC COLLECT: HCPCS

## 2020-11-22 PROCEDURE — 761N000004 HC RECOVERY PHASE 1 LEVEL 2 EA ADDTL HR: Performed by: SURGERY

## 2020-11-22 PROCEDURE — 99140 ANES COMP EMERGENCY COND: CPT | Performed by: NURSE ANESTHETIST, CERTIFIED REGISTERED

## 2020-11-22 PROCEDURE — U0003 INFECTIOUS AGENT DETECTION BY NUCLEIC ACID (DNA OR RNA); SEVERE ACUTE RESPIRATORY SYNDROME CORONAVIRUS 2 (SARS-COV-2) (CORONAVIRUS DISEASE [COVID-19]), AMPLIFIED PROBE TECHNIQUE, MAKING USE OF HIGH THROUGHPUT TECHNOLOGIES AS DESCRIBED BY CMS-2020-01-R: HCPCS | Performed by: FAMILY MEDICINE

## 2020-11-22 PROCEDURE — 83690 ASSAY OF LIPASE: CPT | Performed by: EMERGENCY MEDICINE

## 2020-11-22 PROCEDURE — 255N000002 HC RX 255 OP 636: Performed by: EMERGENCY MEDICINE

## 2020-11-22 PROCEDURE — 271N000001 HC OR GENERAL SUPPLY NON-STERILE: Performed by: SURGERY

## 2020-11-22 PROCEDURE — 250N000009 HC RX 250: Performed by: NURSE ANESTHETIST, CERTIFIED REGISTERED

## 2020-11-22 PROCEDURE — 99285 EMERGENCY DEPT VISIT HI MDM: CPT | Performed by: FAMILY MEDICINE

## 2020-11-22 PROCEDURE — 99285 EMERGENCY DEPT VISIT HI MDM: CPT | Mod: 25

## 2020-11-22 PROCEDURE — 87040 BLOOD CULTURE FOR BACTERIA: CPT | Performed by: FAMILY MEDICINE

## 2020-11-22 RX ORDER — EPHEDRINE SULFATE 50 MG/ML
INJECTION, SOLUTION INTRAMUSCULAR; INTRAVENOUS; SUBCUTANEOUS PRN
Status: DISCONTINUED | OUTPATIENT
Start: 2020-11-22 | End: 2020-11-23

## 2020-11-22 RX ORDER — POTASSIUM CHLORIDE 20MEQ/15ML
40 LIQUID (ML) ORAL ONCE
Status: DISCONTINUED | OUTPATIENT
Start: 2020-11-22 | End: 2020-11-22

## 2020-11-22 RX ORDER — LIDOCAINE HYDROCHLORIDE 20 MG/ML
INJECTION, SOLUTION INFILTRATION; PERINEURAL PRN
Status: DISCONTINUED | OUTPATIENT
Start: 2020-11-22 | End: 2020-11-23

## 2020-11-22 RX ORDER — MORPHINE SULFATE 4 MG/ML
4 INJECTION, SOLUTION INTRAMUSCULAR; INTRAVENOUS ONCE
Status: COMPLETED | OUTPATIENT
Start: 2020-11-22 | End: 2020-11-22

## 2020-11-22 RX ORDER — IOPAMIDOL 612 MG/ML
100 INJECTION, SOLUTION INTRAVASCULAR ONCE
Status: COMPLETED | OUTPATIENT
Start: 2020-11-22 | End: 2020-11-22

## 2020-11-22 RX ORDER — HYDROMORPHONE HYDROCHLORIDE 1 MG/ML
0.5 INJECTION, SOLUTION INTRAMUSCULAR; INTRAVENOUS; SUBCUTANEOUS
Status: DISCONTINUED | OUTPATIENT
Start: 2020-11-22 | End: 2020-11-23 | Stop reason: HOSPADM

## 2020-11-22 RX ORDER — SODIUM CHLORIDE, SODIUM LACTATE, POTASSIUM CHLORIDE, CALCIUM CHLORIDE 600; 310; 30; 20 MG/100ML; MG/100ML; MG/100ML; MG/100ML
INJECTION, SOLUTION INTRAVENOUS CONTINUOUS PRN
Status: DISCONTINUED | OUTPATIENT
Start: 2020-11-22 | End: 2020-11-23

## 2020-11-22 RX ORDER — FENTANYL CITRATE 50 UG/ML
INJECTION, SOLUTION INTRAMUSCULAR; INTRAVENOUS PRN
Status: DISCONTINUED | OUTPATIENT
Start: 2020-11-22 | End: 2020-11-23

## 2020-11-22 RX ORDER — ONDANSETRON 2 MG/ML
4 INJECTION INTRAMUSCULAR; INTRAVENOUS ONCE
Status: COMPLETED | OUTPATIENT
Start: 2020-11-22 | End: 2020-11-22

## 2020-11-22 RX ORDER — PROPOFOL 10 MG/ML
INJECTION, EMULSION INTRAVENOUS PRN
Status: DISCONTINUED | OUTPATIENT
Start: 2020-11-22 | End: 2020-11-23

## 2020-11-22 RX ADMIN — IOPAMIDOL 100 ML: 612 INJECTION, SOLUTION INTRAVENOUS at 19:36

## 2020-11-22 RX ADMIN — PROPOFOL 200 MG: 10 INJECTION, EMULSION INTRAVENOUS at 23:02

## 2020-11-22 RX ADMIN — HYDROMORPHONE HYDROCHLORIDE 0.5 MG: 1 INJECTION, SOLUTION INTRAMUSCULAR; INTRAVENOUS; SUBCUTANEOUS at 21:51

## 2020-11-22 RX ADMIN — MORPHINE SULFATE 4 MG: 4 INJECTION, SOLUTION INTRAMUSCULAR; INTRAVENOUS at 18:50

## 2020-11-22 RX ADMIN — SODIUM CHLORIDE 1000 ML: 9 INJECTION, SOLUTION INTRAVENOUS at 18:50

## 2020-11-22 RX ADMIN — Medication 100 MG: at 23:02

## 2020-11-22 RX ADMIN — ROCURONIUM BROMIDE 30 MG: 10 INJECTION INTRAVENOUS at 23:32

## 2020-11-22 RX ADMIN — ONDANSETRON 4 MG: 2 INJECTION INTRAMUSCULAR; INTRAVENOUS at 18:47

## 2020-11-22 RX ADMIN — ROCURONIUM BROMIDE 10 MG: 10 INJECTION INTRAVENOUS at 23:01

## 2020-11-22 RX ADMIN — SODIUM CHLORIDE, POTASSIUM CHLORIDE, SODIUM LACTATE AND CALCIUM CHLORIDE: 600; 310; 30; 20 INJECTION, SOLUTION INTRAVENOUS at 22:58

## 2020-11-22 RX ADMIN — LIDOCAINE HYDROCHLORIDE 40 MG: 20 INJECTION, SOLUTION INFILTRATION; PERINEURAL at 23:02

## 2020-11-22 RX ADMIN — TAZOBACTAM SODIUM AND PIPERACILLIN SODIUM 4.5 G: 500; 4 INJECTION, SOLUTION INTRAVENOUS at 22:09

## 2020-11-22 RX ADMIN — FENTANYL CITRATE 200 MCG: 50 INJECTION, SOLUTION INTRAMUSCULAR; INTRAVENOUS at 23:00

## 2020-11-22 RX ADMIN — Medication 10 MG: at 23:32

## 2020-11-23 VITALS
TEMPERATURE: 99.5 F | SYSTOLIC BLOOD PRESSURE: 101 MMHG | HEART RATE: 123 BPM | RESPIRATION RATE: 16 BRPM | DIASTOLIC BLOOD PRESSURE: 60 MMHG | OXYGEN SATURATION: 98 %

## 2020-11-23 LAB
LABORATORY COMMENT REPORT: NORMAL
SARS-COV-2 RNA SPEC QL NAA+PROBE: NEGATIVE
SARS-COV-2 RNA SPEC QL NAA+PROBE: NORMAL
SPECIMEN SOURCE: NORMAL
SPECIMEN SOURCE: NORMAL

## 2020-11-23 PROCEDURE — 44970 LAPAROSCOPY APPENDECTOMY: CPT | Performed by: SURGERY

## 2020-11-23 PROCEDURE — 250N000009 HC RX 250: Performed by: SURGERY

## 2020-11-23 PROCEDURE — 250N000013 HC RX MED GY IP 250 OP 250 PS 637: Performed by: SURGERY

## 2020-11-23 PROCEDURE — 258N000003 HC RX IP 258 OP 636: Performed by: NURSE ANESTHETIST, CERTIFIED REGISTERED

## 2020-11-23 PROCEDURE — 360N000020 HC SURGERY LEVEL 3 1ST 30 MIN: Performed by: SURGERY

## 2020-11-23 PROCEDURE — 250N000009 HC RX 250: Performed by: NURSE ANESTHETIST, CERTIFIED REGISTERED

## 2020-11-23 PROCEDURE — 360N000021 HC SURGERY LEVEL 3 EA 15 ADDTL MIN: Performed by: SURGERY

## 2020-11-23 PROCEDURE — 250N000011 HC RX IP 250 OP 636: Performed by: NURSE ANESTHETIST, CERTIFIED REGISTERED

## 2020-11-23 RX ORDER — ALBUTEROL SULFATE 0.83 MG/ML
2.5 SOLUTION RESPIRATORY (INHALATION) EVERY 4 HOURS PRN
Status: DISCONTINUED | OUTPATIENT
Start: 2020-11-23 | End: 2020-11-23 | Stop reason: HOSPADM

## 2020-11-23 RX ORDER — NALOXONE HYDROCHLORIDE 0.4 MG/ML
0.4 INJECTION, SOLUTION INTRAMUSCULAR; INTRAVENOUS; SUBCUTANEOUS
Status: DISCONTINUED | OUTPATIENT
Start: 2020-11-23 | End: 2020-11-23 | Stop reason: HOSPADM

## 2020-11-23 RX ORDER — GLYCOPYRROLATE 0.2 MG/ML
INJECTION, SOLUTION INTRAMUSCULAR; INTRAVENOUS PRN
Status: DISCONTINUED | OUTPATIENT
Start: 2020-11-23 | End: 2020-11-23

## 2020-11-23 RX ORDER — FENTANYL CITRATE 50 UG/ML
25-50 INJECTION, SOLUTION INTRAMUSCULAR; INTRAVENOUS
Status: DISCONTINUED | OUTPATIENT
Start: 2020-11-23 | End: 2020-11-23 | Stop reason: HOSPADM

## 2020-11-23 RX ORDER — OXYCODONE AND ACETAMINOPHEN 5; 325 MG/1; MG/1
1-2 TABLET ORAL EVERY 4 HOURS PRN
Status: DISCONTINUED | OUTPATIENT
Start: 2020-11-23 | End: 2020-11-23 | Stop reason: HOSPADM

## 2020-11-23 RX ORDER — CIPROFLOXACIN 500 MG/1
500 TABLET, FILM COATED ORAL 2 TIMES DAILY
Qty: 10 TABLET | Refills: 0 | Status: SHIPPED | OUTPATIENT
Start: 2020-11-23 | End: 2020-11-23

## 2020-11-23 RX ORDER — METRONIDAZOLE 500 MG/1
500 TABLET ORAL 2 TIMES DAILY
Qty: 10 TABLET | Refills: 0 | Status: SHIPPED | OUTPATIENT
Start: 2020-11-23 | End: 2020-11-23

## 2020-11-23 RX ORDER — HYDROCODONE BITARTRATE AND ACETAMINOPHEN 5; 325 MG/1; MG/1
1 TABLET ORAL
Status: COMPLETED | OUTPATIENT
Start: 2020-11-23 | End: 2020-11-23

## 2020-11-23 RX ORDER — NALOXONE HYDROCHLORIDE 0.4 MG/ML
0.2 INJECTION, SOLUTION INTRAMUSCULAR; INTRAVENOUS; SUBCUTANEOUS
Status: DISCONTINUED | OUTPATIENT
Start: 2020-11-23 | End: 2020-11-23 | Stop reason: HOSPADM

## 2020-11-23 RX ORDER — NEOSTIGMINE METHYLSULFATE 1 MG/ML
VIAL (ML) INJECTION PRN
Status: DISCONTINUED | OUTPATIENT
Start: 2020-11-23 | End: 2020-11-23

## 2020-11-23 RX ORDER — ONDANSETRON 4 MG/1
4 TABLET, ORALLY DISINTEGRATING ORAL EVERY 30 MIN PRN
Status: DISCONTINUED | OUTPATIENT
Start: 2020-11-23 | End: 2020-11-23 | Stop reason: HOSPADM

## 2020-11-23 RX ORDER — IBUPROFEN 200 MG
600 TABLET ORAL
Status: DISCONTINUED | OUTPATIENT
Start: 2020-11-23 | End: 2020-11-23 | Stop reason: HOSPADM

## 2020-11-23 RX ORDER — CIPROFLOXACIN 500 MG/1
500 TABLET, FILM COATED ORAL 2 TIMES DAILY
Qty: 10 TABLET | Refills: 0 | Status: SHIPPED | OUTPATIENT
Start: 2020-11-23 | End: 2020-11-29

## 2020-11-23 RX ORDER — MEPERIDINE HYDROCHLORIDE 25 MG/ML
12.5 INJECTION INTRAMUSCULAR; INTRAVENOUS; SUBCUTANEOUS
Status: DISCONTINUED | OUTPATIENT
Start: 2020-11-23 | End: 2020-11-23 | Stop reason: HOSPADM

## 2020-11-23 RX ORDER — ONDANSETRON 2 MG/ML
4 INJECTION INTRAMUSCULAR; INTRAVENOUS EVERY 30 MIN PRN
Status: DISCONTINUED | OUTPATIENT
Start: 2020-11-23 | End: 2020-11-23 | Stop reason: HOSPADM

## 2020-11-23 RX ORDER — AMOXICILLIN 250 MG
1-2 CAPSULE ORAL 2 TIMES DAILY
Qty: 30 TABLET | Refills: 0 | Status: ON HOLD | OUTPATIENT
Start: 2020-11-23 | End: 2020-12-14

## 2020-11-23 RX ORDER — HYDRALAZINE HYDROCHLORIDE 20 MG/ML
2.5-5 INJECTION INTRAMUSCULAR; INTRAVENOUS EVERY 10 MIN PRN
Status: DISCONTINUED | OUTPATIENT
Start: 2020-11-23 | End: 2020-11-23 | Stop reason: HOSPADM

## 2020-11-23 RX ORDER — KETOROLAC TROMETHAMINE 30 MG/ML
INJECTION, SOLUTION INTRAMUSCULAR; INTRAVENOUS PRN
Status: DISCONTINUED | OUTPATIENT
Start: 2020-11-23 | End: 2020-11-23

## 2020-11-23 RX ORDER — BUPIVACAINE HYDROCHLORIDE AND EPINEPHRINE 2.5; 5 MG/ML; UG/ML
INJECTION, SOLUTION EPIDURAL; INFILTRATION; INTRACAUDAL; PERINEURAL PRN
Status: DISCONTINUED | OUTPATIENT
Start: 2020-11-23 | End: 2020-11-23 | Stop reason: HOSPADM

## 2020-11-23 RX ORDER — OXYCODONE AND ACETAMINOPHEN 5; 325 MG/1; MG/1
1-2 TABLET ORAL EVERY 4 HOURS PRN
Qty: 12 TABLET | Refills: 0 | Status: ON HOLD | OUTPATIENT
Start: 2020-11-23 | End: 2020-12-06

## 2020-11-23 RX ORDER — IBUPROFEN 600 MG/1
600 TABLET, FILM COATED ORAL EVERY 6 HOURS PRN
Qty: 30 TABLET | Refills: 0 | Status: ON HOLD | OUTPATIENT
Start: 2020-11-23 | End: 2020-12-06

## 2020-11-23 RX ORDER — KETOROLAC TROMETHAMINE 30 MG/ML
30 INJECTION, SOLUTION INTRAMUSCULAR; INTRAVENOUS
Status: DISCONTINUED | OUTPATIENT
Start: 2020-11-23 | End: 2020-11-23 | Stop reason: HOSPADM

## 2020-11-23 RX ORDER — HYDROMORPHONE HYDROCHLORIDE 1 MG/ML
0.2 INJECTION, SOLUTION INTRAMUSCULAR; INTRAVENOUS; SUBCUTANEOUS
Status: DISCONTINUED | OUTPATIENT
Start: 2020-11-23 | End: 2020-11-23 | Stop reason: HOSPADM

## 2020-11-23 RX ORDER — HYDROMORPHONE HYDROCHLORIDE 1 MG/ML
.3-.5 INJECTION, SOLUTION INTRAMUSCULAR; INTRAVENOUS; SUBCUTANEOUS EVERY 10 MIN PRN
Status: DISCONTINUED | OUTPATIENT
Start: 2020-11-23 | End: 2020-11-23 | Stop reason: HOSPADM

## 2020-11-23 RX ORDER — SODIUM CHLORIDE, SODIUM LACTATE, POTASSIUM CHLORIDE, CALCIUM CHLORIDE 600; 310; 30; 20 MG/100ML; MG/100ML; MG/100ML; MG/100ML
INJECTION, SOLUTION INTRAVENOUS CONTINUOUS
Status: DISCONTINUED | OUTPATIENT
Start: 2020-11-23 | End: 2020-11-23 | Stop reason: HOSPADM

## 2020-11-23 RX ORDER — ONDANSETRON 2 MG/ML
4 INJECTION INTRAMUSCULAR; INTRAVENOUS EVERY 6 HOURS PRN
Status: DISCONTINUED | OUTPATIENT
Start: 2020-11-23 | End: 2020-11-23 | Stop reason: HOSPADM

## 2020-11-23 RX ORDER — ONDANSETRON 2 MG/ML
INJECTION INTRAMUSCULAR; INTRAVENOUS PRN
Status: DISCONTINUED | OUTPATIENT
Start: 2020-11-23 | End: 2020-11-23

## 2020-11-23 RX ORDER — METRONIDAZOLE 500 MG/1
500 TABLET ORAL 2 TIMES DAILY
Qty: 10 TABLET | Refills: 0 | Status: ON HOLD | OUTPATIENT
Start: 2020-11-23 | End: 2020-11-30

## 2020-11-23 RX ORDER — ONDANSETRON 4 MG/1
4 TABLET, ORALLY DISINTEGRATING ORAL EVERY 6 HOURS PRN
Status: DISCONTINUED | OUTPATIENT
Start: 2020-11-23 | End: 2020-11-23 | Stop reason: HOSPADM

## 2020-11-23 RX ADMIN — KETOROLAC TROMETHAMINE 30 MG: 30 INJECTION, SOLUTION INTRAMUSCULAR at 00:14

## 2020-11-23 RX ADMIN — Medication 2 MG: at 00:21

## 2020-11-23 RX ADMIN — ONDANSETRON 4 MG: 2 INJECTION INTRAMUSCULAR; INTRAVENOUS at 00:14

## 2020-11-23 RX ADMIN — ROCURONIUM BROMIDE 10 MG: 10 INJECTION INTRAVENOUS at 00:07

## 2020-11-23 RX ADMIN — GLYCOPYRROLATE 0.2 MG: 0.2 INJECTION, SOLUTION INTRAMUSCULAR; INTRAVENOUS at 00:21

## 2020-11-23 RX ADMIN — HYDROCODONE BITARTRATE AND ACETAMINOPHEN 1 TABLET: 5; 325 TABLET ORAL at 01:34

## 2020-11-23 RX ADMIN — SODIUM CHLORIDE, POTASSIUM CHLORIDE, SODIUM LACTATE AND CALCIUM CHLORIDE: 600; 310; 30; 20 INJECTION, SOLUTION INTRAVENOUS at 00:19

## 2020-11-23 NOTE — H&P
Emergency Room Consult  2020    Patient: Rosalie Stewart    Admitting Physician: Keegan Burton DO, MD    Admitting diagnosis: Acute appendicitis.  Has imaging which shows acute appendicitis, does not show evidence of perforation or abscess.    Expected length of stay is less than than 2 days    HISTORY OF PRESENT ILLNESS:  This is a 55 year old female a one day history of abdominal pain.  The patient presented to the  ED and was found to have focal RLQ tenderness and an elevated WBC.  A CT of the abdomen and pelvis was obtained which was consistent with acute appendicitis.  General surgery was called.  The patient does have fevers, does chills, does decreased appetite, does symptoms of peritoneal irritation.      Past Medical History:  Past Medical History:   Diagnosis Date     Anxiety      Arthralgia     negative lyme, VINAY, RF, TSH, 2017     Arthritis      Benign essential hypertension      Postmenopausal bleeding     ? U/S & Endometrial [facility]?       Past Surgical History:  Past Surgical History:   Procedure Laterality Date      tubal ligation Bilateral           SECTION  /    2     COLONOSCOPY      negative     ENDOSCOPY      negative       Family History History:  Family History   Family history unknown: Yes       History of Tobacco Use:  History   Smoking Status     Never Smoker   Smokeless Tobacco     Never Used       Current Medications:  Current Outpatient Medications   Medication Sig Dispense Refill     Calcium Citrate-Vitamin D (CALCIUM + D PO) Take 1,200 mg by mouth daily       fish oil-omega-3 fatty acids 1000 MG capsule Take 1 capsule by mouth daily       lisinopril-hydrochlorothiazide (ZESTORETIC) 20-25 MG tablet TAKE 1 TABLET BY MOUTH DAILY 90 tablet 0     multivitamin, therapeutic with minerals (MULTI-VITAMIN) TABS tablet Take 1 tablet by mouth daily       Naproxen Sodium (ALEVE PO) Take 600 mg by mouth daily       simvastatin (ZOCOR)  20 MG tablet TAKE 1 TABLET(20 MG) BY MOUTH AT BEDTIME 90 tablet 1       Allergies:  Allergies   Allergen Reactions     Adhesive Tape      bandaids - non cloth     Seasonal Allergies        ROS:  Pertinent items are noted in HPI.    PHYSICAL EXAM:     Vital signs: /90   Pulse 100   Temp 100  F (37.8  C) (Oral)   Resp 18   SpO2 96%    Weight: [unfilled]   BMI: There is no height or weight on file to calculate BMI.   General: Normal, healthy   Skin: no rashes   HEENT: PERRLA and EOMI   Neck: supple, without adenopathy   Lungs: clear to auscultation, without wheezes, without crackles   CV: Regular rate and rhythm without murmer   Abdominal: abdomen is soft with significant tenderness in right lower quadrant,    Extremities: No cyanosis, clubbing or edema noted bilaterally in Upper and Lower Extremities   Neurological: without deficit    CBC RESULTS:   Recent Labs   Lab Test 11/22/20  1844   WBC 13.8*   RBC 4.55   HGB 14.6   HCT 41.4   MCV 91   MCH 32.1   MCHC 35.3   RDW 12.7          Last Comprehensive Metabolic Panel:  Recent Labs   Lab Test 11/22/20  1844      POTASSIUM 3.3*   CHLORIDE 98   CO2 27   ANIONGAP 8   *   BUN 10   CR 0.68   GFRESTIMATED >90   ANGELA 10.0   BILITOTAL 0.7   ALKPHOS 76   ALT 61*   AST 44       RADIOLOGY:    PROCEDURE:  CT ABDOMEN PELVIS W CONTRAST     HISTORY: abdominal pain, diarrhea, borderline fever, assess for  diverticulitis or other intra-abdominal pathology     TECHNIQUE:  Helical CT of the abdomen and pelvis was performed  following injection of intravenous contrast.       COMPARISON:  11/22/19.     MEDS/CONTRAST: Isovue 300 100 Ml     FINDINGS:       Limited images through the lung bases demonstrate no focal  consolidation or mass.  The heart size is normal. No pericardial or  pleural effusions are seen.     The liver demonstrates probable mild steatosis.  The gallbladder,  spleen, pancreas and adrenal glands are unremarkable.  Symmetric  nephrograms are  present without hydronephrosis. There is no abdominal  aortic aneurysm.     The bowel is normal in caliber. The appendix is dilated up to 1.4 cm  with adjacent stranding and a phlebolith at the base.     No free air or adenopathy is present. 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                                                                      IMPRESSION:       Acute appendicitis without free air or abscess formation.    I have reviewed the patients CT of the abdomen and pelvis and agree that there is evidence of acute appendicits    ASSESSMENT: This is a 55 year old female with acute appendicitis.     The patient does not show signs of sepsis.   The patient does not show evidence of perforation.   The patient does not show evidence of abscess.    PLAN: The patient will be taken to the operating room for appendectomy.     The plan was dicussed with the patient and/or his/her care giver. All of their questions were answered.  They consent to the plan above.    Keegan Burton, DO on 11/22/2020 at 10:25 PM

## 2020-11-23 NOTE — ANESTHESIA POSTPROCEDURE EVALUATION
Patient: Rosalie Stewart    Procedure(s):  APPENDECTOMY, LAPAROSCOPIC    Diagnosis:* No pre-op diagnosis entered *  Diagnosis Additional Information: No value filed.    Anesthesia Type:  General    Note:  Anesthesia Post Evaluation    Patient location during evaluation: PACU  Patient participation: Able to fully participate in evaluation  Level of consciousness: awake and alert  Airway patency: patent  Cardiovascular status: acceptable  Respiratory status: acceptable  Hydration status: acceptable  PONV: none             Last vitals:  Vitals:    11/23/20 0049 11/23/20 0054 11/23/20 0100   BP: 154/85 135/72 135/72   Pulse: 79 80 86   Resp: 13 23 15   Temp:      SpO2: 96% 98% 97%         Electronically Signed By: MIRZA Subramanian CRNA  November 23, 2020  1:02 AM

## 2020-11-23 NOTE — ED NOTES
Patient ambulatory to ED room 6. Patient states she has a known history of dairy intolerance and did have some tortellini pasta last evening. Patient thought maybe she had an upset stomach, but pain did not subside. Patient gestures to RLQ and LLQ where pain is and is tender to the epigastric area. Patient notes that she had some feeling she might have an emesis, but just mostly spit up some phlegm. Patient also notes some intermittent loose stools. States that she has been previously seen for this and dairy intolerance but due to COVID things have been delayed. Patient into gown and call light in reach.

## 2020-11-23 NOTE — OP NOTE
REPORT OF OPERATION  DATE OF PROCEDURE: 11/23/2020    PATIENT: Rosalie Stewart    SURGERY PREFORMED: Laparoscopic Appendectomy    PREOPERATIVE DIAGNOSIS: Acute Supprative Appendicitis    POSTOPERATIVE DIAGNOSIS: Acute Supprative Appendicitis    SURGEON: Keegan Burton DO    ASSISTANTS: None    ANESTHESIA: General Endotracheal Anesthesia    COMPLICATIONS: None apparent    TRANSFUSIONS: None    TISSUE TO PATHOLOGY: Appendix to Pathology for pathological diagnosis    FINDINGS: Acute Supprative Appendicitis, with erosion of appendicolith in to wall of the appendix    INDICATIONS:  This is a 55 year old female with focal right lower quadrant peritoneal signs, a CT consistent with Acute Supprative Appendicitis and a WBC of 13.8.  The patient will be taken to the operating suite for a laparoscopic possible open appendectomy.     DESCRIPTIONS OF PROCEDURE IN DETAIL: After consent was obtained the patient was taken to the operative suite and ofelia in the supine position.  The patient was identified and the correct patient was confirmed.  General endotracheal anesthesia was induced by anesthesia.  The patient was sterilely prepped and draped in the usual fashion.  A time out was preformed verifying the correct patient and the correct procedure.  The entire operative team was in agreement.  All necessary equipment and supplies were in the room.    Through a supraumbilical incision, the skin was sharply entered and via Veress needle pneumoperitoneum was obtained.  The laparoscope was inserted into a 5mm trocar and was then inserted into the abdomen, verification of no intraabdominal trauma was made.  Under direct visualization one 5mm left lateral trocar and one 10mm suprapubic port were placed with verification of no intraabdominal trauma. The omentum was adhered to the anterior abdominal wall occluding visualization and was taken down using a ligasure device.  The appendix was then isolated and retracted.  The cecum and  right colon did not require mobilization. There was an appendicolith that had eroded into the wall and with retraction some purulence was encountered which was suctioned. The mesoappendix was taken down using the Ligasure until the base of the appendix as isolated.  The appendix was amputated using the Endo-ERIKA stapler. The appendix was removed from the abdomen and sent to pathology for pathological diagnosis.  Hemostasis was assured,  The abdomen was copiously irrigated with 3 liters and the irrigant was removed.  Hemostasis was again assured.   All instrumentation was removed.  Sponge, needle and instrument counts were correct.  The supraumbilical trocar site's fascia was closed with a figure-of-eight of 0 Vicryl.  All skin incisions were closed with subcuticular 4-0 Monocryl.  Sterile dressings were applied.  The patient was awakened in the operative suite and extubated without difficulty and taken to the recovery room in stable condition tolerating the procedure well         Keegan Burton DO on 11/23/2020 at 12:33 AM

## 2020-11-23 NOTE — ANESTHESIA PROCEDURE NOTES
Airway   Date/Time: 11/22/2020 11:04 PM        Staff -   CRNA: Osman Delgado APRN CRNA  Performed By: CRNA    Consent for Airway   Urgency: elective    Indications and Patient Condition  Indications for airway management: yoni-procedural  Mask difficulty assessment: 0 - not attempted    Final Airway Details  Final airway type: endotracheal airway  Successful airway:ETT - single  Endotracheal Airway Details   ETT size (mm): 7.0  Grade View of Cords: 2  Measured from: lips  Secured at (cm): 21  Secured with: plastic tape  Bite block used: None    Post intubation assessment   Placement verified by: capnometry, equal breath sounds and chest rise   Number of attempts at approach: 1  Secured with:plastic tape  Ease of procedure: easy  Dentition: Intact and Unchanged

## 2020-11-23 NOTE — ED PROVIDER NOTES
History     Chief Complaint   Patient presents with     Abdominal Pain     having intermittent soft stools, started today     HPI  Rosalie Stewart is a 55 year old female who presents with abdominal pain.  She reports some abdominal discomfort beginning last night after eating dinner.  She notes intermittent cramping abdominal pain in the mid abdomen which is waxing and waning throughout the day but never completely gone.  She reports several watery stools today and reports nausea without vomiting denies any tactile fevers.  She notes no sick contacts.  She denies any cough or respiratory symptoms.  She denies any dysuria or hematuria.  She denies any vaginal bleeding or discharge.  2 prior C-sections,  no other abdominal surgeries.       Allergies:  Allergies   Allergen Reactions     Adhesive Tape      bandaids - non cloth     Seasonal Allergies        Problem List:    Patient Active Problem List    Diagnosis Date Noted     Morbid obesity (H) 2019     Priority: Medium     Hyperlipidemia, unspecified hyperlipidemia type 2019     Priority: Medium     Benign essential hypertension 2017     Priority: Medium     Anxiety 2017     Priority: Medium     Obesity, Class II, BMI 35-39.9, with comorbidity 2017     Priority: Medium        Past Medical History:    Past Medical History:   Diagnosis Date     Anxiety      Arthralgia      Arthritis      Benign essential hypertension      Postmenopausal bleeding        Past Surgical History:    Past Surgical History:   Procedure Laterality Date      tubal ligation Bilateral           SECTION      2     COLONOSCOPY      negative     ENDOSCOPY      negative       Family History:    Family History   Family history unknown: Yes       Social History:  Marital Status:   [4]  Social History     Tobacco Use     Smoking status: Never Smoker     Smokeless tobacco: Never Used   Substance Use Topics     Alcohol use: Yes      Comment: occasionally, 2x/week     Drug use: No        Medications:         Calcium Citrate-Vitamin D (CALCIUM + D PO)       fish oil-omega-3 fatty acids 1000 MG capsule       lisinopril-hydrochlorothiazide (ZESTORETIC) 20-25 MG tablet       multivitamin, therapeutic with minerals (MULTI-VITAMIN) TABS tablet       Naproxen Sodium (ALEVE PO)       simvastatin (ZOCOR) 20 MG tablet          Review of Systems   All systems reviewed and otherwise negative.    Physical Exam   BP: (!) 185/132  Pulse: 108  Temp: 100.1  F (37.8  C)  Resp: 18  SpO2: 96 %  Constitutional: Well developed, Well nourished, NAD.  HENT: Normocephalic, Atraumatic, Bilateral external ears normal. Neck Supple.  Eyes: EOMI, Conjunctiva normal.  Respiratory: Breathing comfortably on room air. Speaks full sentences easily. Lungs clear to ascultation.  Cardiovascular: Normal heart rate, Regular rhythm. No peripheral edema.  Abdomen: Soft, mild diffuse tenderness. No lateralizing peritonitis or guarding.  Musculoskeletal: Good range of motion in all major joints. No major deformities noted.  Integument: Warm, Dry.  Neurologic: Alert & awake, Normal motor function, Normal sensory function, No focal deficits noted.   Psychiatric: Cooperative. Mood and affect normal.        ED Course     2140  CT returns with acute appendicitis. Patient re-examined demonstrating worsening RLQ pain with rebound tenderness. Patient discussed with Dr. Burton who will assess the patient at bedside.      Results for orders placed or performed during the hospital encounter of 11/22/20 (from the past 24 hour(s))   Comprehensive metabolic panel   Result Value Ref [place]    Sodium 133 133 - 144 mmol/L    Potassium 3.3 (L) 3.4 - 5.3 mmol/L    Chloride 98 94 - 109 mmol/L    Carbon Dioxide 27 20 - 32 mmol/L    Anion Gap 8 3 - 14 mmol/L    Glucose 124 (H) 70 - 99 mg/dL    Urea Nitrogen 10 7 - 30 mg/dL    Creatinine 0.68 0.52 - 1.04 mg/dL    GFR Estimate >90 >60 mL/min/[1.73_m2]    GFR  Estimate If Black >90 >60 mL/min/[1.73_m2]    Calcium 10.0 8.5 - 10.1 mg/dL    Bilirubin Total 0.7 0.2 - 1.3 mg/dL    Albumin 4.3 3.4 - 5.0 g/dL    Protein Total 8.2 6.8 - 8.8 g/dL    Alkaline Phosphatase 76 40 - 150 U/L    ALT 61 (H) 0 - 50 U/L    AST 44 0 - 45 U/L   Lipase   Result Value Ref [place]    Lipase 54 (L) 73 - 393 U/L   CBC with platelets differential   Result Value Ref [place]    WBC 13.8 (H) 4.0 - 11.0 10e9/L    RBC Count 4.55 3.8 - 5.2 10e12/L    Hemoglobin 14.6 11.7 - 15.7 g/dL    Hematocrit 41.4 35.0 - 47.0 %    MCV 91 78 - 100 fl    MCH 32.1 26.5 - 33.0 pg    MCHC 35.3 31.5 - 36.5 g/dL    RDW 12.7 10.0 - 15.0 %    Platelet Count 282 150 - 450 10e9/L    Diff Method Automated Method     % Neutrophils 85.3 %    % Lymphocytes 9.3 %    % Monocytes 4.6 %    % Eosinophils 0.1 %    % Basophils 0.3 %    % Immature Granulocytes 0.4 %    Nucleated RBCs 0 0 /100    Absolute Neutrophil 11.8 (H) 1.6 - 8.3 10e9/L    Absolute Lymphocytes 1.3 0.8 - 5.3 10e9/L    Absolute Monocytes 0.6 0.0 - 1.3 10e9/L    Absolute Eosinophils 0.0 0.0 - 0.7 10e9/L    Absolute Basophils 0.0 0.0 - 0.2 10e9/L    Abs Immature Granulocytes 0.1 0 - 0.4 10e9/L    Absolute Nucleated RBC 0.0    Lactic acid whole blood   Result Value Ref [place]    Lactic Acid 1.8 0.7 - 2.0 mmol/L   UA with Microscopic reflex to Culture    Specimen: Midstream Urine   Result Value Ref [place]    Color Urine Light Yellow     Appearance Urine Clear     Glucose Urine Negative NEG^Negative mg/dL    Bilirubin Urine Negative NEG^Negative    Ketones Urine 80 (A) NEG^Negative mg/dL    Specific Gravity Urine 1.027 1.003 - 1.035    Blood Urine Moderate (A) NEG^Negative    pH Urine 5.5 4.7 - 8.0 pH    Protein Albumin Urine 10 (A) NEG^Negative mg/dL    Urobilinogen mg/dL Normal 0.0 - 2.0 mg/dL    Nitrite Urine Negative NEG^Negative    Leukocyte Esterase Urine Negative NEG^Negative    Source Midstream Urine     WBC Urine 1 0 - 5 /HPF    RBC Urine 20 (H) 0 - 2 /HPF    Bacteria  Urine None (A) NEG^Negative /HPF    Squamous Epithelial /HPF Urine 1 0 - 1 /HPF    Mucous Urine Present (A) NEG^Negative /LPF   HCG qualitative urine   Result Value Ref [place]    HCG Qual Urine Negative NEG^Negative   CT Abdomen Pelvis w Contrast    Narrative    PROCEDURE:  CT ABDOMEN PELVIS W CONTRAST    HISTORY: abdominal pain, diarrhea, borderline fever, assess for  diverticulitis or other intra-abdominal pathology    TECHNIQUE:  Helical CT of the abdomen and pelvis was performed  following injection of intravenous contrast.      COMPARISON:  11/22/19.    MEDS/CONTRAST: Isovue 300 100 Ml    FINDINGS:      Limited images through the lung bases demonstrate no focal  consolidation or mass.  The heart size is normal. No pericardial or  pleural effusions are seen.    The liver demonstrates probable mild steatosis.  The gallbladder,  spleen, pancreas and adrenal glands are unremarkable.  Symmetric  nephrograms are present without hydronephrosis. There is no abdominal  aortic aneurysm.    The bowel is normal in caliber. The appendix is dilated up to 1.4 cm  with adjacent stranding and a phlebolith at the base.    No free air or adenopathy is present. 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      Impression    IMPRESSION:      Acute appendicitis without free air or abscess formation.    EV ROME MD       Medications   0.9% sodium chloride BOLUS (0 mLs Intravenous Stopped 11/22/20 1950)   ondansetron (ZOFRAN) injection 4 mg (4 mg Intravenous Given 11/22/20 1847)   morphine (PF) injection 4 mg (4 mg Intravenous Given 11/22/20 1850)   iopamidol (ISOVUE-300) IV solution 61% 100 mL (100 mLs Intravenous Given 11/22/20 1936)   sodium chloride (PF) 0.9% PF flush 60 mL (50 mLs Intravenous Given 11/22/20 1936)       Assessments & Plan (with Medical Decision Making)   Patient is a 55-year-old female who presents to the emergency department with abdominal pain, diarrhea, nausea.  Differential would include  gastroenteritis, diverticulitis, appendicitis, C. difficile, etc. She is mildly tachycardic initially upon arrival to the emergency department and has a low-grade temperature elevation to 100.1.  Labs and imaging were obtained as above.  This was notable for a leukocytosis to 13 with left shift.  Mild hypokalemia.  Urinalysis with some ketones indicative of some dehydration.  She was treated with IV fluids, Zofran, and morphine initially for pain.  Tachycardia resolved after IV fluids.  She was sent for CT abdomen pelvis which revealed acute appendicitis. Patient will be taken to surgery by Dr. Burton.    I have reviewed the nursing notes.    I have reviewed the findings, diagnosis, plan and need for follow up with the patient.    New Prescriptions    No medications on file       Final diagnoses:   Abdominal pain, generalized   Diarrhea, unspecified type   Acute appendicitis with localized peritonitis without abscess, unspecified whether gangrene present, unspecified whether perforation present       11/22/2020   [facility]     Shaun Raphael MD  11/22/20 8048

## 2020-11-23 NOTE — ANESTHESIA PREPROCEDURE EVALUATION
Anesthesia Pre-Procedure Evaluation    Patient: Rosalie Stewart   MRN: [id number] : 1965          Preoperative Diagnosis: * No pre-op diagnosis entered *    Procedure(s):  APPENDECTOMY, LAPAROSCOPIC    Past Medical History:   Diagnosis Date     Anxiety      Arthralgia     negative lyme, VINAY, RF, TSH, 2017     Arthritis      Benign essential hypertension      Postmenopausal bleeding     ? U/S & Endometrial biopsy done at [facility]?     Past Surgical History:   Procedure Laterality Date      tubal ligation Bilateral           SECTION      2     COLONOSCOPY      negative     ENDOSCOPY      negative       Anesthesia Evaluation     . Pt has had prior anesthetic.     No history of anesthetic complications          ROS/MED HX    ENT/Pulmonary:  - neg pulmonary ROS     Neurologic:  - neg neurologic ROS     Cardiovascular:     (+) hypertension----. : . . . :. .       METS/Exercise Tolerance:  >4 METS   Hematologic:  - neg hematologic  ROS       Musculoskeletal:   (+) arthritis,  -       GI/Hepatic:     (+) appendicitis,       Renal/Genitourinary:  - ROS Renal section negative       Endo:     (+) Obesity, .      Psychiatric:     (+) psychiatric history anxiety      Infectious Disease:  - neg infectious disease ROS       Malignancy:      - no malignancy   Other:    - neg other ROS                      Physical Exam  Normal systems: cardiovascular, pulmonary and dental    Airway   Mallampati: II  TM distance: >3 FB  Neck ROM: full    Dental   (+) chipped    Cardiovascular   Rhythm and rate: regular and normal      Pulmonary    breath sounds clear to auscultation            Lab Results   Component Value Date    WBC 13.8 (H) 2020    HGB 14.6 2020    HCT 41.4 2020     2020    CRP 4.2 2019     2020    POTASSIUM 3.3 (L) 2020    CHLORIDE 98 2020    CO2 27 2020    BUN 10 2020    CR 0.68 2020     (H) 2020  "   ANGELA 10.0 11/22/2020    ALBUMIN 4.3 11/22/2020    PROTTOTAL 8.2 11/22/2020    ALT 61 (H) 11/22/2020    AST 44 11/22/2020    ALKPHOS 76 11/22/2020    BILITOTAL 0.7 11/22/2020    LIPASE 54 (L) 11/22/2020    TSH 2.14 01/17/2018    HCG Negative 11/22/2020       Preop Vitals  BP Readings from Last 3 Encounters:   11/22/20 177/90   11/22/19 138/84   11/18/19 (!) 138/96    Pulse Readings from Last 3 Encounters:   11/22/20 100   11/18/19 104   06/14/18 87      Resp Readings from Last 3 Encounters:   11/22/20 18   11/18/19 20   01/17/18 16    SpO2 Readings from Last 3 Encounters:   11/22/20 96%   11/18/19 95%   08/05/19 96%      Temp Readings from Last 1 Encounters:   11/22/20 100  F (37.8  C) (Oral)    Ht Readings from Last 1 Encounters:   11/18/19 1.581 m (5' 2.25\")      Wt Readings from Last 1 Encounters:   11/18/19 108.9 kg (240 lb)    Estimated body mass index is 43.54 kg/m  as calculated from the following:    Height as of 11/18/19: 1.581 m (5' 2.25\").    Weight as of 11/18/19: 108.9 kg (240 lb).       Anesthesia Plan      History & Physical Review  History and physical reviewed and following examination; no interval change.    ASA Status:  3 emergent.    NPO Status:  Full stomach and > 6 hours    Plan for General with Intravenous induction. Maintenance will be Balanced.             Postoperative Care      Consents  Anesthetic plan, risks, benefits and alternatives discussed with:  Patient..                 MIRZA Subramanian CRNA  "

## 2020-11-23 NOTE — OR NURSING
Patient and responsible adult given discharge instructions with no questions regarding instructions. Ron score 19/20. Pain level 2/10.  Discharged from unit via wheelchair. Patient discharged to home.

## 2020-11-23 NOTE — DISCHARGE INSTRUCTIONS
Had 1 hydrocodone at 1:34 A.M. on 11-23-20     Post-Anesthesia Patient Instructions    IMMEDIATELY FOLLOWING SURGERY:  Do not drive or operate machinery for the first twenty four hours after surgery.  Do not make any important decisions for twenty four hours after surgery or while taking narcotic pain medications or sedatives.  If you develop intractable nausea and vomiting or a severe headache please notify your doctor immediately.    FOLLOW-UP:  A message was left for a follow-up for you to see either Dr. Fernandez or Dr. Cobian in 7-10 days.  The clinic will call you with that appointment if they do not call you, call them at [phone].    WOUND CARE INSTRUCTIONS (if applicable):  Keep a dry clean dressing on the anesthesia/puncture wound site if there is drainage.  Once the wound has quit draining you may leave it open to air.  Generally you should leave the bandage intact for twenty four hours unless there is drainage.  If the epidural site drains for more than 36-48 hours please call the anesthesia department.    QUESTIONS?:  Please feel free to call your physician or the hospital  if you have any questions, and they will be happy to assist you.   What to expect when you have contrast    During your exam, we will inject  contrast  into your vein or artery. (Contrast is a clear liquid with iodine in it. It shows up on X-rays.)    You may feel warm or hot. You may have a metal taste in your mouth and a slight upset stomach. You may also feel pressure near the kidneys and bladder. These effects will last about 1 to 3 minutes.    Please tell us if you have:    Sneezing     Itching    Hives     Swelling in the face    A hoarse voice    Breathing problems    Other new symptoms    Serious problems are rare.  They may include:    Irregular heartbeat     Seizures    Kidney failure              Tissue damage    Shock      Death    If you have any problems during the exam, we  will treat them right away.    When you  get home    Call your hospital if you have any new symptoms in the next 2 days, like hives or swelling. (Phone numbers are at the bottom of this page.) Or call your family doctor.     If you have wheezing or trouble breathing, call 911.    Self-care  -Drink at least 4 extra glasses of water today.   This reduces the stress on your kidneys.  -Keep taking your regular medicines.    The contrast will pass out of your body in your  Urine(pee). This will happen in the next 24 hours. You  will not feel this. Your urine will not  change color.    If you have kidney problems or take metformin    Drink 4 to 8 large glasses of water for the next  2 days, if you are not on a fluid restriction.    ?If you take metformin (Glucophage or Glucovance) for diabetes, keep taking it.      ?Your kidney function tests are abnormal.  If you take Metformin, do not take it for 48 hours. Please go to your clinic for a blood test within 3 days after your exam before the restarting this medicine.     (Note to provider:please give patient prescription for lab tests.)    ?Special instructions:     I have read and understand the above information.    Patient Sign Here:______________________________________Date:________Time:______    Staff Sign Here:________________________________________Date:_______Time:______      Radiology Departments:     ?[facility]: [phone] ?[place]: [phone]     ?[place]: [phone] ?[facility]:[phone]      ?[place]: [phone]  ?[place]: [phone]  ?[place]:[phone]    ?[facility]:[phone]  ?[facility]:[phone]

## 2020-11-23 NOTE — ANESTHESIA CARE TRANSFER NOTE
Patient: Rosalie Stewart    Procedure(s):  APPENDECTOMY, LAPAROSCOPIC    Diagnosis: * No pre-op diagnosis entered *  Diagnosis Additional Information: No value filed.    Anesthesia Type:   General     Note:  Airway :Nasal Cannula  Patient transferred to:PACU  Handoff Report: Identifed the Patient, Identified the Reponsible Provider, Reviewed the pertinent medical history, Discussed the surgical course, Reviewed Intra-OP anesthesia mangement and issues during anesthesia, Set expectations for post-procedure period and Allowed opportunity for questions and acknowledgement of understanding      Vitals: (Last set prior to Anesthesia Care Transfer)    CRNA VITALS  11/23/2020 0015 - 11/23/2020 0052      11/23/2020             EKG:  Sinus rhythm                Electronically Signed By: MIRZA Subramanian CRNA  November 23, 2020  12:52 AM

## 2020-11-24 LAB — COPATH REPORT: NORMAL

## 2020-11-26 ENCOUNTER — NURSE TRIAGE (OUTPATIENT)
Dept: NURSING | Facility: CLINIC | Age: 55
End: 2020-11-26

## 2020-11-26 NOTE — TELEPHONE ENCOUNTER
"Patient calling reporting having an appendectomy on 11/22/20. Stating she \"did not feel good today.\" Reporting some nausea, ongoing pain rating \"4\" around right lower quadrant. Patient took pain medication 30 minutes ago. Rating pain level \"4\" sitting nausea. Decreased appetite. Taking fluids denies change in voiding. Afebrile. Stating she is sleeping ok. Incision sites intact, denies redness or discharge.  Advised to call PCP with in 24 hours. Patient agrees to contact clinic 11/27/20.    Nataly Vegas RN  [facility]      COVID 19 Nurse Triage Plan/Patient Instructions    Please be aware that novel coronavirus (COVID-19) may be circulating in the community. If you develop symptoms such as fever, cough, or SOB or if you have concerns about the presence of another infection including coronavirus (COVID-19), please contact your health care provider or visit [email or web address].     Disposition/Instructions    Virtual Visit with provider recommended. Reference Visit Selection Guide.    Thank you for taking steps to prevent the spread of this virus.  o Limit your contact with others.  o Wear a simple mask to cover your cough.  o Wash your hands well and often.    Resources    [facility]: About COVID-19: [email or web address]    CDC: What to Do If You're Sick: www.cdc.gov/coronavirus/2019-ncov/about/steps-when-sick.html    CDC: Ending Home Isolation: www.cdc.gov/coronavirus/2019-ncov/hcp/disposition-in-home-patients.html     CDC: Caring for Someone: www.cdc.gov/coronavirus/2019-ncov/if-you-are-sick/care-for-someone.html     [facility]: Interim Guidance for Hospital Discharge to Home: www.health.[facility].[place].us/diseases/coronavirus/hcp/hospdischarge.pdf    [facility] clinical trials (COVID-19 research studies): clinicalaffairs.[facility]/umn-clinical-trials     Below are the COVID-19 hotlines at the [place] Department [facility] ([facility]). Interpreters are available.   o For health questions: Call " 162.843.2896 or 1-566.186.3972 (7 a.m. to 7 p.m.)  o For questions about schools and childcare: Call 265-011-0019 or 1-534.870.2559 (7 a.m. to 7 p.m.)                        Additional Information    Negative: [1] Major abdominal surgical incision AND [2] wound gaping open AND [3] visible internal organs    Negative: Sounds like a life-threatening emergency to the triager    Negative: [1] Bleeding from incision AND [2] won't stop after 10 minutes of direct pressure    Negative: [1] Widespread rash AND [2] bright red, sunburn-like    Negative: Severe pain in the incision    Negative: [1] Incision gaping open AND [2] < 48 hours since wound re-opened    Negative: [1] Incision gaping open AND [2] length of opening > 2 inches (5 cm)    Negative: Patient sounds very sick or weak to the triager    Negative: Sounds like a serious complication to the triager    Negative: Fever > 100.4 F (38.0 C)    Negative: [1] Incision looks infected (spreading redness, pain) AND [2] fever > 99.5 F (37.5 C)    Negative: [1] Incision looks infected (spreading redness, pain) AND [2] large red area (> 2 in. or 5 cm)    Negative: [1] Incision looks infected (spreading redness, pain) AND [2] face wound    Negative: [1] Red streak runs from the incision AND [2] longer than 1 inch (2.5 cm)    Negative: [1] Pus or bad-smelling fluid draining from incision AND [2] no fever    Negative: [1] Post-op pain AND [2] not controlled with pain medications    Negative: Dressing soaked with blood or body fluid (e.g., drainage)    Negative: [1] Raised bruise and [2] size > 2 inches (5 cm) and expanding    Negative: [1] Caller has URGENT question AND [2] triager unable to answer question    [1] INCREASING pain in incision AND [2] > 2 days (48 hours) since surgery    Protocols used: POST-OP INCISION SYMPTOMS-A-AH

## 2020-11-28 LAB
BACTERIA SPEC CULT: NORMAL
BACTERIA SPEC CULT: NORMAL
SPECIMEN SOURCE: NORMAL
SPECIMEN SOURCE: NORMAL

## 2020-11-29 ENCOUNTER — HOSPITAL ENCOUNTER (INPATIENT)
Facility: HOSPITAL | Age: 55
LOS: 7 days | Discharge: HOME | DRG: 872 | End: 2020-12-06
Attending: EMERGENCY MEDICINE | Admitting: STUDENT IN AN ORGANIZED HEALTH CARE EDUCATION/TRAINING PROGRAM
Payer: COMMERCIAL

## 2020-11-29 ENCOUNTER — APPOINTMENT (OUTPATIENT)
Dept: CT IMAGING | Facility: HOSPITAL | Age: 55
DRG: 872 | End: 2020-11-29
Attending: EMERGENCY MEDICINE
Payer: COMMERCIAL

## 2020-11-29 DIAGNOSIS — R10.31 RLQ ABDOMINAL PAIN: ICD-10-CM

## 2020-11-29 DIAGNOSIS — I10 HYPERTENSION, UNSPECIFIED TYPE: ICD-10-CM

## 2020-11-29 DIAGNOSIS — Z90.49 S/P APPY: ICD-10-CM

## 2020-11-29 DIAGNOSIS — K65.1 POSTPROCEDURAL INTRAABDOMINAL ABSCESS (H): Primary | ICD-10-CM

## 2020-11-29 DIAGNOSIS — A41.9 SEPSIS, DUE TO UNSPECIFIED ORGANISM, UNSPECIFIED WHETHER ACUTE ORGAN DYSFUNCTION PRESENT (H): ICD-10-CM

## 2020-11-29 DIAGNOSIS — T81.43XA POSTPROCEDURAL INTRAABDOMINAL ABSCESS (H): Primary | ICD-10-CM

## 2020-11-29 LAB
ALBUMIN SERPL-MCNC: 2.8 G/DL (ref 3.4–5)
ALBUMIN UR-MCNC: NEGATIVE MG/DL
ALP SERPL-CCNC: 79 U/L (ref 40–150)
ALT SERPL W P-5'-P-CCNC: 28 U/L (ref 0–50)
ANION GAP SERPL CALCULATED.3IONS-SCNC: 9 MMOL/L (ref 3–14)
APPEARANCE UR: CLEAR
APTT PPP: 33 SEC (ref 22–37)
AST SERPL W P-5'-P-CCNC: 28 U/L (ref 0–45)
BACTERIA #/AREA URNS HPF: ABNORMAL /HPF
BASOPHILS # BLD AUTO: 0.1 10E9/L (ref 0–0.2)
BASOPHILS NFR BLD AUTO: 1 %
BILIRUB SERPL-MCNC: 0.4 MG/DL (ref 0.2–1.3)
BILIRUB UR QL STRIP: NEGATIVE
BUN SERPL-MCNC: 13 MG/DL (ref 7–30)
CALCIUM SERPL-MCNC: 9.1 MG/DL (ref 8.5–10.1)
CHLORIDE SERPL-SCNC: 103 MMOL/L (ref 94–109)
CO2 SERPL-SCNC: 26 MMOL/L (ref 20–32)
COLOR UR AUTO: ABNORMAL
CREAT SERPL-MCNC: 0.6 MG/DL (ref 0.52–1.04)
CREAT SERPL-MCNC: 0.66 MG/DL (ref 0.52–1.04)
DIFFERENTIAL METHOD BLD: ABNORMAL
EOSINOPHIL # BLD AUTO: 0.1 10E9/L (ref 0–0.7)
EOSINOPHIL NFR BLD AUTO: 1 %
ERYTHROCYTE [DISTWIDTH] IN BLOOD BY AUTOMATED COUNT: 13.2 % (ref 10–15)
GFR SERPL CREATININE-BSD FRML MDRD: >90 ML/MIN/{1.73_M2}
GFR SERPL CREATININE-BSD FRML MDRD: >90 ML/MIN/{1.73_M2}
GLUCOSE SERPL-MCNC: 124 MG/DL (ref 70–99)
GLUCOSE UR STRIP-MCNC: NEGATIVE MG/DL
HCT VFR BLD AUTO: 36.5 % (ref 35–47)
HGB BLD-MCNC: 12.6 G/DL (ref 11.7–15.7)
HGB UR QL STRIP: ABNORMAL
INR PPP: 1.06 (ref 0.86–1.14)
KETONES UR STRIP-MCNC: NEGATIVE MG/DL
LABORATORY COMMENT REPORT: NORMAL
LACTATE BLD-SCNC: 1 MMOL/L (ref 0.7–2)
LEUKOCYTE ESTERASE UR QL STRIP: NEGATIVE
LIPASE SERPL-CCNC: 55 U/L (ref 73–393)
LYMPHOCYTES # BLD AUTO: 2.6 10E9/L (ref 0.8–5.3)
LYMPHOCYTES NFR BLD AUTO: 19 %
MCH RBC QN AUTO: 31.9 PG (ref 26.5–33)
MCHC RBC AUTO-ENTMCNC: 34.5 G/DL (ref 31.5–36.5)
MCV RBC AUTO: 92 FL (ref 78–100)
MONOCYTES # BLD AUTO: 0.8 10E9/L (ref 0–1.3)
MONOCYTES NFR BLD AUTO: 6 %
MUCOUS THREADS #/AREA URNS LPF: PRESENT /LPF
NEUTROPHILS # BLD AUTO: 10.1 10E9/L (ref 1.6–8.3)
NEUTROPHILS NFR BLD AUTO: 73 %
NITRATE UR QL: NEGATIVE
PH UR STRIP: 6.5 PH (ref 4.7–8)
PLATELET # BLD AUTO: 373 10E9/L (ref 150–450)
POTASSIUM SERPL-SCNC: 3.1 MMOL/L (ref 3.4–5.3)
POTASSIUM SERPL-SCNC: 3.2 MMOL/L (ref 3.4–5.3)
PROCALCITONIN SERPL-MCNC: 0.28 NG/ML
PROT SERPL-MCNC: 8.1 G/DL (ref 6.8–8.8)
RBC # BLD AUTO: 3.95 10E12/L (ref 3.8–5.2)
RBC #/AREA URNS AUTO: 3 /HPF (ref 0–2)
SARS-COV-2 RNA SPEC QL NAA+PROBE: NEGATIVE
SARS-COV-2 RNA SPEC QL NAA+PROBE: NORMAL
SODIUM SERPL-SCNC: 138 MMOL/L (ref 133–144)
SOURCE: ABNORMAL
SP GR UR STRIP: 1.01 (ref 1–1.03)
SPECIMEN SOURCE: NORMAL
SPECIMEN SOURCE: NORMAL
SQUAMOUS #/AREA URNS AUTO: 7 /HPF (ref 0–1)
UROBILINOGEN UR STRIP-MCNC: NORMAL MG/DL (ref 0–2)
WBC # BLD AUTO: 13.9 10E9/L (ref 4–11)
WBC #/AREA URNS AUTO: 1 /HPF (ref 0–5)

## 2020-11-29 PROCEDURE — 82565 ASSAY OF CREATININE: CPT | Performed by: STUDENT IN AN ORGANIZED HEALTH CARE EDUCATION/TRAINING PROGRAM

## 2020-11-29 PROCEDURE — 96367 TX/PROPH/DG ADDL SEQ IV INF: CPT

## 2020-11-29 PROCEDURE — 36415 COLL VENOUS BLD VENIPUNCTURE: CPT | Performed by: EMERGENCY MEDICINE

## 2020-11-29 PROCEDURE — 85610 PROTHROMBIN TIME: CPT | Performed by: EMERGENCY MEDICINE

## 2020-11-29 PROCEDURE — 87635 SARS-COV-2 COVID-19 AMP PRB: CPT | Performed by: EMERGENCY MEDICINE

## 2020-11-29 PROCEDURE — 999N000157 HC STATISTIC RCP TIME EA 10 MIN

## 2020-11-29 PROCEDURE — 81001 URINALYSIS AUTO W/SCOPE: CPT | Performed by: EMERGENCY MEDICINE

## 2020-11-29 PROCEDURE — 84132 ASSAY OF SERUM POTASSIUM: CPT | Performed by: STUDENT IN AN ORGANIZED HEALTH CARE EDUCATION/TRAINING PROGRAM

## 2020-11-29 PROCEDURE — 99222 1ST HOSP IP/OBS MODERATE 55: CPT | Mod: 24 | Performed by: STUDENT IN AN ORGANIZED HEALTH CARE EDUCATION/TRAINING PROGRAM

## 2020-11-29 PROCEDURE — 96372 THER/PROPH/DIAG INJ SC/IM: CPT

## 2020-11-29 PROCEDURE — 84145 PROCALCITONIN (PCT): CPT | Performed by: EMERGENCY MEDICINE

## 2020-11-29 PROCEDURE — 99285 EMERGENCY DEPT VISIT HI MDM: CPT | Mod: 25

## 2020-11-29 PROCEDURE — 83605 ASSAY OF LACTIC ACID: CPT | Performed by: EMERGENCY MEDICINE

## 2020-11-29 PROCEDURE — 250N000011 HC RX IP 250 OP 636

## 2020-11-29 PROCEDURE — 96365 THER/PROPH/DIAG IV INF INIT: CPT

## 2020-11-29 PROCEDURE — 87040 BLOOD CULTURE FOR BACTERIA: CPT | Performed by: EMERGENCY MEDICINE

## 2020-11-29 PROCEDURE — 250N000009 HC RX 250: Performed by: EMERGENCY MEDICINE

## 2020-11-29 PROCEDURE — 74177 CT ABD & PELVIS W/CONTRAST: CPT

## 2020-11-29 PROCEDURE — 250N000013 HC RX MED GY IP 250 OP 250 PS 637: Performed by: STUDENT IN AN ORGANIZED HEALTH CARE EDUCATION/TRAINING PROGRAM

## 2020-11-29 PROCEDURE — 83690 ASSAY OF LIPASE: CPT | Performed by: EMERGENCY MEDICINE

## 2020-11-29 PROCEDURE — 99285 EMERGENCY DEPT VISIT HI MDM: CPT | Performed by: EMERGENCY MEDICINE

## 2020-11-29 PROCEDURE — 85730 THROMBOPLASTIN TIME PARTIAL: CPT | Performed by: EMERGENCY MEDICINE

## 2020-11-29 PROCEDURE — 80053 COMPREHEN METABOLIC PANEL: CPT | Performed by: EMERGENCY MEDICINE

## 2020-11-29 PROCEDURE — 250N000011 HC RX IP 250 OP 636: Performed by: EMERGENCY MEDICINE

## 2020-11-29 PROCEDURE — 36415 COLL VENOUS BLD VENIPUNCTURE: CPT | Performed by: STUDENT IN AN ORGANIZED HEALTH CARE EDUCATION/TRAINING PROGRAM

## 2020-11-29 PROCEDURE — 255N000002 HC RX 255 OP 636: Performed by: EMERGENCY MEDICINE

## 2020-11-29 PROCEDURE — 120N000001 HC R&B MED SURG/OB

## 2020-11-29 PROCEDURE — 258N000003 HC RX IP 258 OP 636: Performed by: EMERGENCY MEDICINE

## 2020-11-29 PROCEDURE — 85025 COMPLETE CBC W/AUTO DIFF WBC: CPT | Performed by: EMERGENCY MEDICINE

## 2020-11-29 PROCEDURE — C9803 HOPD COVID-19 SPEC COLLECT: HCPCS

## 2020-11-29 PROCEDURE — 96375 TX/PRO/DX INJ NEW DRUG ADDON: CPT

## 2020-11-29 PROCEDURE — 258N000003 HC RX IP 258 OP 636: Performed by: STUDENT IN AN ORGANIZED HEALTH CARE EDUCATION/TRAINING PROGRAM

## 2020-11-29 PROCEDURE — 250N000011 HC RX IP 250 OP 636: Performed by: STUDENT IN AN ORGANIZED HEALTH CARE EDUCATION/TRAINING PROGRAM

## 2020-11-29 RX ORDER — LIDOCAINE 40 MG/G
CREAM TOPICAL
Status: DISCONTINUED | OUTPATIENT
Start: 2020-11-29 | End: 2020-12-06 | Stop reason: HOSPADM

## 2020-11-29 RX ORDER — FENTANYL CITRATE 50 UG/ML
50 INJECTION, SOLUTION INTRAMUSCULAR; INTRAVENOUS EVERY 30 MIN PRN
Status: DISCONTINUED | OUTPATIENT
Start: 2020-11-29 | End: 2020-11-29

## 2020-11-29 RX ORDER — SIMVASTATIN 20 MG
20 TABLET ORAL AT BEDTIME
Status: DISCONTINUED | OUTPATIENT
Start: 2020-11-29 | End: 2020-12-06 | Stop reason: HOSPADM

## 2020-11-29 RX ORDER — NALOXONE HYDROCHLORIDE 0.4 MG/ML
.1-.4 INJECTION, SOLUTION INTRAMUSCULAR; INTRAVENOUS; SUBCUTANEOUS
Status: DISCONTINUED | OUTPATIENT
Start: 2020-11-29 | End: 2020-12-06 | Stop reason: HOSPADM

## 2020-11-29 RX ORDER — IOPAMIDOL 612 MG/ML
100 INJECTION, SOLUTION INTRAVASCULAR ONCE
Status: COMPLETED | OUTPATIENT
Start: 2020-11-29 | End: 2020-11-29

## 2020-11-29 RX ORDER — ACETAMINOPHEN 325 MG/1
650 TABLET ORAL EVERY 4 HOURS PRN
Status: DISCONTINUED | OUTPATIENT
Start: 2020-11-29 | End: 2020-12-06 | Stop reason: HOSPADM

## 2020-11-29 RX ORDER — ONDANSETRON 2 MG/ML
4 INJECTION INTRAMUSCULAR; INTRAVENOUS EVERY 6 HOURS PRN
Status: DISCONTINUED | OUTPATIENT
Start: 2020-11-29 | End: 2020-12-06 | Stop reason: HOSPADM

## 2020-11-29 RX ORDER — OXYCODONE HYDROCHLORIDE 5 MG/1
5-10 TABLET ORAL
Status: DISCONTINUED | OUTPATIENT
Start: 2020-11-29 | End: 2020-12-06 | Stop reason: HOSPADM

## 2020-11-29 RX ORDER — SODIUM CHLORIDE, SODIUM LACTATE, POTASSIUM CHLORIDE, CALCIUM CHLORIDE 600; 310; 30; 20 MG/100ML; MG/100ML; MG/100ML; MG/100ML
1000 INJECTION, SOLUTION INTRAVENOUS CONTINUOUS
Status: DISCONTINUED | OUTPATIENT
Start: 2020-11-29 | End: 2020-12-01

## 2020-11-29 RX ORDER — ENALAPRILAT 1.25 MG/ML
1.25 INJECTION INTRAVENOUS ONCE
Status: COMPLETED | OUTPATIENT
Start: 2020-11-29 | End: 2020-11-29

## 2020-11-29 RX ORDER — ONDANSETRON 4 MG/1
4 TABLET, ORALLY DISINTEGRATING ORAL EVERY 6 HOURS PRN
Status: DISCONTINUED | OUTPATIENT
Start: 2020-11-29 | End: 2020-12-06 | Stop reason: HOSPADM

## 2020-11-29 RX ORDER — HYDROMORPHONE HYDROCHLORIDE 1 MG/ML
.3-.5 INJECTION, SOLUTION INTRAMUSCULAR; INTRAVENOUS; SUBCUTANEOUS EVERY 4 HOURS PRN
Status: DISCONTINUED | OUTPATIENT
Start: 2020-11-29 | End: 2020-12-06 | Stop reason: HOSPADM

## 2020-11-29 RX ADMIN — OXYCODONE HYDROCHLORIDE 5 MG: 5 TABLET ORAL at 20:17

## 2020-11-29 RX ADMIN — SODIUM CHLORIDE 1000 ML: 9 INJECTION, SOLUTION INTRAVENOUS at 09:43

## 2020-11-29 RX ADMIN — HYDROMORPHONE HYDROCHLORIDE 0.3 MG: 1 INJECTION, SOLUTION INTRAMUSCULAR; INTRAVENOUS; SUBCUTANEOUS at 19:10

## 2020-11-29 RX ADMIN — METRONIDAZOLE 500 MG: 500 INJECTION, SOLUTION INTRAVENOUS at 12:01

## 2020-11-29 RX ADMIN — ACETAMINOPHEN 650 MG: 325 TABLET, FILM COATED ORAL at 14:52

## 2020-11-29 RX ADMIN — LISINOPRIL: 10 TABLET ORAL at 15:01

## 2020-11-29 RX ADMIN — ENOXAPARIN SODIUM 40 MG: 40 INJECTION SUBCUTANEOUS at 14:52

## 2020-11-29 RX ADMIN — TAZOBACTAM SODIUM AND PIPERACILLIN SODIUM 3.38 G: 375; 3 INJECTION, SOLUTION INTRAVENOUS at 10:00

## 2020-11-29 RX ADMIN — TAZOBACTAM SODIUM AND PIPERACILLIN SODIUM 4.5 G: 500; 4 INJECTION, SOLUTION INTRAVENOUS at 21:47

## 2020-11-29 RX ADMIN — FENTANYL CITRATE 50 MCG: 50 INJECTION, SOLUTION INTRAMUSCULAR; INTRAVENOUS at 09:43

## 2020-11-29 RX ADMIN — ACETAMINOPHEN 650 MG: 325 TABLET, FILM COATED ORAL at 20:17

## 2020-11-29 RX ADMIN — HYDROMORPHONE HYDROCHLORIDE 0.3 MG: 1 INJECTION, SOLUTION INTRAMUSCULAR; INTRAVENOUS; SUBCUTANEOUS at 14:52

## 2020-11-29 RX ADMIN — TAZOBACTAM SODIUM AND PIPERACILLIN SODIUM 4.5 G: 500; 4 INJECTION, SOLUTION INTRAVENOUS at 16:06

## 2020-11-29 RX ADMIN — ENALAPRILAT 1.25 MG: 1.25 INJECTION INTRAVENOUS at 10:00

## 2020-11-29 RX ADMIN — SODIUM CHLORIDE, POTASSIUM CHLORIDE, SODIUM LACTATE AND CALCIUM CHLORIDE 1000 ML: 600; 310; 30; 20 INJECTION, SOLUTION INTRAVENOUS at 14:29

## 2020-11-29 RX ADMIN — IOPAMIDOL 100 ML: 612 INJECTION, SOLUTION INTRAVENOUS at 10:29

## 2020-11-29 RX ADMIN — SIMVASTATIN 20 MG: 20 TABLET, FILM COATED ORAL at 21:47

## 2020-11-29 ASSESSMENT — MIFFLIN-ST. JEOR: SCORE: 1644.13

## 2020-11-29 NOTE — ED NOTES
"Pt ambulatory to ED room 4 with c/o right sided abd to flank tenderness and pain that has been progressing since Thursday. Pt states that she had an appendectomy on 11/22 and was \"feeling good.\" Pt states that she has been taking her Rx'd pain medication with some relief. Pt denies cough, fever, SOB, and dysuria. No n/v/d noted and last BM was yesterday and normal per patient. Temperature 100.9 in triage, and pt unaware that she had a fever.   "

## 2020-11-29 NOTE — H&P
[facility]    History and Physical  General Surgery     Date of Admission:  11/29/2020    Assessment & Plan   Rosalie Stewart is a 55 year old female who presents fluid collection s/p laparoscopic appendectomy on 11/23 concerning for possible post operative abscess/phlegmon vs leak vs other. She presented with low grade fever, hypertension, and tachycardia in the setting of progressive RLQ pain, nausea, and CT confirmation of RLQ fluid collection. Fortunately she has been voiding and stooling regularly at home, does not have diffuse abdominal peritonitis, and is responding to initial resuscitative measures. This makes free dehiscence somewhat less likely.    Will plan to admit to the surgical service for resuscitation and likely drainage procedure. If the patient decompensates, will proceed for surgical management.     - IV Zosyn, follow up blood cultures sent in ED  - Low concern for covid. Denies symptoms and exposure since negative test last week.   - Clear liquid diet as tolerated this evening, NPO at midnight.  - Consult to IR for consideration of drain placement tomorrow (Discussed with patient).     Ok for one dose of lovenox this afternoon, none after midnight pending plan with IR.     Nayan Bowser MD    Code Status   Full Code    Primary Care Physician   Anne Morrison    Chief Complaint   Progressive RLQ abdominal pain    History is obtained from the patient    History of Present Illness   Rosalie Stewart is a 55 year old female who presents with progressive RLQ abdominal pain. She had an uncomplicated laparoscopic appendectomy very early the morning of 11/23/2020. She recovered well the first three days, but had progressive pain, loss of appetite, nausea, and inability to sleep over the last 4 days. She was unable to sleep last night or take her antihypertensives due to the pain. She does report that she has been voiding and stooling without issue at home. Last bowel movement was non  bloody and occurred on .     Past Medical History    HTN    Past Surgical History   I have reviewed this patient's surgical history and updated it with pertinent information if needed.  Past Surgical History:   Procedure Laterality Date      tubal ligation Bilateral           SECTION      2     COLONOSCOPY      negative     ENDOSCOPY      negative     LAPAROSCOPIC APPENDECTOMY N/A 2020    Procedure: APPENDECTOMY, LAPAROSCOPIC;  Surgeon: Keegan Burton DO;  Location: [facility] OR       Prior to Admission Medications   Prior to Admission Medications   Prescriptions Last Dose Informant Patient Reported? Taking?   Calcium Citrate-Vitamin D (CALCIUM + D PO)  Self Yes No   Sig: Take 1,200 mg by mouth daily   HYDROcodone-acetaminophen (NORCO) 5-325 MG   No No   Sig: Take 1 tablet by mouth every 6 hours as needed for moderate to severe pain   Naproxen Sodium (ALEVE PO)  Self Yes No   Sig: Take 600 mg by mouth daily   fish oil-omega-3 fatty acids 1000 MG capsule  Self Yes No   Sig: Take 1 capsule by mouth daily   ibuprofen (ADVIL/MOTRIN) 600 MG tablet   No No   Sig: Take 1 tablet (600 mg) by mouth every 6 hours as needed for pain (mild)   lisinopril-hydrochlorothiazide (ZESTORETIC) 20-25 MG tablet 2020 at Unknown time  No Yes   Sig: TAKE 1 TABLET BY MOUTH DAILY   metroNIDAZOLE (FLAGYL) 500 MG tablet   No No   Sig: Take 1 tablet (500 mg) by mouth 2 times daily   multivitamin, therapeutic with minerals (MULTI-VITAMIN) TABS tablet  Self Yes No   Sig: Take 1 tablet by mouth daily   oxyCODONE-acetaminophen (PERCOCET) 5-325 MG tablet   No No   Sig: Take 1-2 tablets by mouth every 4 hours as needed for pain (moderate to severe)   senna-docusate (SENOKOT-S/PERICOLACE) 8.6-50 MG tablet   No No   Sig: Take 1-2 tablets by mouth 2 times daily Take while on oral narcotics to prevent or treat constipation.   simvastatin (ZOCOR) 20 MG tablet   No No   Sig: TAKE 1 TABLET(20 MG) BY MOUTH AT  BEDTIME      Facility-Administered Medications: None     Allergies   Allergies   Allergen Reactions     Adhesive Tape      bandaids - non cloth     Seasonal Allergies        Social History   Lives at home with boyfriend. Daughter had been staying with her post operatively.     Family History   Denies family history of bleeding or clotting disorders, no issues with anesthesia.     Review of Systems   The 10 point Review of Systems is negative other than noted in the HPI or here.   None.     Physical Exam   Temp: 99.6  F (37.6  C) Temp src: Oral BP: 139/77 Pulse: 77   Resp: 19 SpO2: 93 % O2 Device: None (Room air)    Vital Signs with Ranges  Temp:  [99.6  F (37.6  C)-100.9  F (38.3  C)] 99.6  F (37.6  C)  Pulse:  [] 77  Resp:  [12-25] 19  BP: (139-201)/() 139/77  SpO2:  [92 %-95 %] 93 %  0 lbs 0 oz  Gen: pleasant, no apparent distress  Resp: normal, symmetric respiratory effort  CV: regular rate  Abd: soft, obese. The incisions are healing well. The left side of the abdomen is soft and non tender. The patient is tender to palpation of the RLQ without rebound.   Neuro: alert, moving all extremities equally   Ext: warm well perfused         Data   Most Recent 3 CBC's:  Recent Labs   Lab Test 11/29/20  0928 11/22/20  1844 11/18/19  1452   WBC 13.9* 13.8* 6.8   HGB 12.6 14.6 14.1   MCV 92 91 93    282 270     Most Recent 3 BMP's:  Recent Labs   Lab Test 11/29/20  0928 11/22/20  1844 11/18/19  1452    133 137   POTASSIUM 3.2* 3.3* 3.6   CHLORIDE 103 98 102   CO2 26 27 29   BUN 13 10 13   CR 0.60 0.68 0.75   ANIONGAP 9 8 6   ANGELA 9.1 10.0 10.1   * 124* 90       CT-Abdomen/Pelvis  11/29/20     I reviewed the CT scan myself and agree with Dr. Fontenot. The origin and content of the fluid collection is somewhat difficult to say given her overall clinical appearance.     IMPRESSION:   Findings concerning for dehiscence following recent appendectomy with  a 6.2 x 3.2 x 3.8 cm localized abscess in  the right hemiabdomen.  Appendicolith appears to persist in the right lower quadrant.     Localized peritonitis of the right hemiabdomen with small fluid  collections concerning for small peritoneal abscesses involving both  the left and right hemiabdomen.   -MD Nayan MARTIN MD  General Surgery

## 2020-11-29 NOTE — ED PROVIDER NOTES
History     Chief Complaint   Patient presents with     Post-op Problem     appendectomy on 11/22. increased pain since thursday. no n/v/d. last BM yesterday      HPI  Rosalie Stewart 1965  is a 55 year old female who presents via triage during covid pandemic via triage.   History/ exam in N95 precautions.    Patient is status post appendectomy on 1122 2020-7 days ago and over the last 3 days she has had increasing abdominal pain most notable in the right lower quadrant.  She has had no nausea vomiting or diarrhea.  Last normal yesterday.  At triage today she is notably tachycardic with elevated temperature.  She was brought back to the room for further emergent evaluation.  history is limited due to pain.    Op note review from 11/22/2020 by Dr. Burton( surgery)  indicates acute suppurative appendicitis with erosion of appendicolith into the wall of the appendix.  She had a uncomplicated laparoscopic appendectomy.  I am unable locate a discharge summary from this hospital admission.    Rosalie Stewart 55 year old 1965 notes to me she went home after surgery and was doing well up until 3 days ago in which she has had increasing progressive pain to the point where her pain medications had to be escalated.  She stopped antibiotics orally 2 days ago and since then has had progressive right lower quadrant pain worse with movement.  She is not able take her medications today for blood pressure.  Per patient [place] any headache, neck pain, jaw pain, runny nose, sore throat, cough, change in taste or smell.  No shortness of breath.  She denies any leg pain or leg swelling.  Denies any hemoptysis.    Pain in abdomen is right lower quadrant, constant nonradiating and severe.  She denies any skin rashes of the abdominal wall or wound discharge.    Patient is not on steroids.  Not on blood thinners.  She notes she has not had her home blood pressure medications today.      Allergies:  Allergies   Allergen Reactions      Adhesive Tape      bandaids - non cloth     Seasonal Allergies        Problem List:    Patient Active Problem List    Diagnosis Date Noted     RLQ abdominal pain 2020     Priority: Medium     S/P appy 2020     Priority: Medium     Postprocedural intraabdominal abscess 2020     Priority: Medium     Hypertension, unspecified type 2020     Priority: Medium     Sepsis, due to unspecified organism, unspecified whether acute organ dysfunction present (H) 2020     Priority: Medium     Abdominal pain, generalized 2020     Priority: Medium     Diarrhea, unspecified type 2020     Priority: Medium     Acute appendicitis with localized peritonitis without abscess, unspecified whether gangrene present, unspecified whether perforation present 2020     Priority: Medium     Morbid obesity (H) 2019     Priority: Medium     Hyperlipidemia, unspecified hyperlipidemia type 2019     Priority: Medium     Benign essential hypertension 2017     Priority: Medium     Anxiety 2017     Priority: Medium     Obesity, Class II, BMI 35-39.9, with comorbidity 2017     Priority: Medium        Past Medical History:    Past Medical History:   Diagnosis Date     Anxiety      Arthralgia      Arthritis      Benign essential hypertension      Postmenopausal bleeding        Past Surgical History:    Past Surgical History:   Procedure Laterality Date      tubal ligation Bilateral           SECTION      2     COLONOSCOPY      negative     ENDOSCOPY      negative     LAPAROSCOPIC APPENDECTOMY N/A 2020    Procedure: APPENDECTOMY, LAPAROSCOPIC;  Surgeon: Keegan Burton DO;  Location: [facility] OR       Family History:    Family History   Family history unknown: Yes       Social History:  Marital Status:   [4]  Social History     Tobacco Use     Smoking status: Never Smoker     Smokeless tobacco: Never Used   Substance Use Topics      Alcohol use: Yes     Comment: occasionally, 2x/week     Drug use: No        Medications:    No current outpatient medications on file.        Review of Systems   Fever chills.  No runny nose, sore throat, cough.  No chest pain.  No leg swelling.  No urinary symptoms.  All 10 systems are negative per his results.    Physical Exam   BP: (!) 201/134  Pulse: 117  Temp: 100.9  F (38.3  C)  Resp: 20  SpO2: 95 %      Physical Exam   Nursing note and vitals reviewed.  Constitutional: The patient appears well-developed.  Is uncomfortable and mildly ill.  Speaks in full sentences.  My exam in N95.  HENT:   Mouth/Throat: Oropharynx is clear and moist.        Normal inspection   Eyes: Pupils are equal, round, and reactive to light.   Neck: Trachea normal. Neck supple. No rigidity. No tracheal deviation present.   Cardiovascular: tachy rate, regular rhythm, normal heart sounds and intact distal pulses.    No murmur heard.  Pulmonary/Chest: Effort normal and breath sounds normal. No stridor. No respiratory distress.   Abdominal: Soft.  Surgical scars are lower abdomen.  There is subtle redness of the right lower abdominal wall that resolves when removing the patient's stretchy pants that cover her abdominal wall.  The surgical incisions are without tenderness erythema warmth or discharge.  Patient has focal tenderness in right lower quadrant with subtle rebound.  No Reyna sign.  No left lower quadrant pain.  Musculoskeletal: Normal range of motion.        No signs of swelling.   : Normal flanks.  Neurological: The patient is alert.  Does not, conversant.  No focal arm or leg weakness.  Skin: Skin is warm and dry. No rash noted.  Postsurgical changes abdominal wall noted.  Psychiatric: The patient's behavior is normal.  Cooperative.      ED Course     ED Course as of Nov 29 1430   Sun Nov 29, 2020   0917 Triage note and vitals reviewed.  Febrile, hypertensive. Sepsis initiated, covid precautions.  IVF and pain control. S/p  appy.       0954 History exam was completed.  Patient does not have any pulmonary complaints or clinical suggestion of PE.  Patient's abdominal wounds appear quite well.  There is subtle erythema over the right lower quadrant but this resolves after removing her pants.  No obvious cellulitic changes or wound infections.  Patient is had no urinary symptoms.  She has no leg pain or suggestion of DVT.    Concern to be of intra-abdominal postoperative complication such as abscess or other inflammatory changes.      1006 Wbc noted, cx pending. Blood, urine. Ok for abx and CT scan. No covid sx.       1024 Sbp improved. Pain improved with fentanyl. Mild low k+.       1135 CT noted, surgery paged;        1136 Inpatient bed available.       1139 Dr. Bowser, surgery accepts and notes he will admit primarily.  Dr. Bowser will see in ED in ~ 15 minutes. Patient updated.       1215 Surgeon at bedside.          Procedures            Results for orders placed or performed during the hospital encounter of 11/29/20 (from the past 24 hour(s))   CBC with platelets differential   Result Value Ref Range    WBC 13.9 (H) 4.0 - 11.0 10e9/L    RBC Count 3.95 3.8 - 5.2 10e12/L    Hemoglobin 12.6 11.7 - 15.7 g/dL    Hematocrit 36.5 35.0 - 47.0 %    MCV 92 78 - 100 fl    MCH 31.9 26.5 - 33.0 pg    MCHC 34.5 31.5 - 36.5 g/dL    RDW 13.2 10.0 - 15.0 %    Platelet Count 373 150 - 450 10e9/L    Diff Method Manual Differential     % Neutrophils 73.0 %    % Lymphocytes 19.0 %    % Monocytes 6.0 %    % Eosinophils 1.0 %    % Basophils 1.0 %    Absolute Neutrophil 10.1 (H) 1.6 - 8.3 10e9/L    Absolute Lymphocytes 2.6 0.8 - 5.3 10e9/L    Absolute Monocytes 0.8 0.0 - 1.3 10e9/L    Absolute Eosinophils 0.1 0.0 - 0.7 10e9/L    Absolute Basophils 0.1 0.0 - 0.2 10e9/L   Comprehensive metabolic panel   Result Value Ref Range    Sodium 138 133 - 144 mmol/L    Potassium 3.2 (L) 3.4 - 5.3 mmol/L    Chloride 103 94 - 109 mmol/L    Carbon Dioxide 26 20 - 32  mmol/L    Anion Gap 9 3 - 14 mmol/L    Glucose 124 (H) 70 - 99 mg/dL    Urea Nitrogen 13 7 - 30 mg/dL    Creatinine 0.60 0.52 - 1.04 mg/dL    GFR Estimate >90 >60 mL/min/[1.73_m2]    GFR Estimate If Black >90 >60 mL/min/[1.73_m2]    Calcium 9.1 8.5 - 10.1 mg/dL    Bilirubin Total 0.4 0.2 - 1.3 mg/dL    Albumin 2.8 (L) 3.4 - 5.0 g/dL    Protein Total 8.1 6.8 - 8.8 g/dL    Alkaline Phosphatase 79 40 - 150 U/L    ALT 28 0 - 50 U/L    AST 28 0 - 45 U/L   Lactic acid whole blood   Result Value Ref Range    Lactic Acid 1.0 0.7 - 2.0 mmol/L   Procalcitonin   Result Value Ref Range    Procalcitonin 0.28 ng/ml   INR   Result Value Ref Range    INR 1.06 0.86 - 1.14   Partial thromboplastin time   Result Value Ref Range    PTT 33 22 - 37 sec   Lipase   Result Value Ref Range    Lipase 55 (L) 73 - 393 U/L   Symptomatic COVID-19 Virus (Coronavirus) by PCR    Specimen: Nasopharyngeal   Result Value Ref Range    COVID-19 Virus PCR to [facility] - Source Nasopharyngeal     COVID-19 Virus PCR to [facility] - Result       Test received-See reflex to [facility] [place] test SARS CoV2 (COVID-19) Virus RT-PCR   CT Abdomen Pelvis w Contrast    Narrative    CT ABDOMEN PELVIS W CONTRAST    CLINICAL HISTORY: Female, age 55 years,  abd pain, rlq, elevated temp,  s/p appy. ? complication. pain;    Comparison:  CT scan abdomen and pelvis 11/22/2020    TECHNIQUE:  CT was performed of the abdomen and pelvis with IV  contrast. Sagittal, coronal, axial and 3-D MIP reconstructions were  reviewed.     FINDINGS:  Lung bases: There is now platelike atelectasis within the right middle  lobe and right lower lobe. Visualized portions of the heart are  unremarkable.    Liver: Normal. Portal venous system is patent.    Gallbladder: Normal. Biliary tree is grossly normal.    Spleen: Normal.    Pancreas: Normal.    Adrenal glands: Normal.    Kidneys: Normal.  Ureters: Normal.  Urinary bladder: Normal.    Postoperative changes are seen consistent with recent  appendectomy. An  11 mm calcific density that has the appearance of an appendicolith  persists in the right lower quadrant. There is a 6.2 x 3.2 x 3.8 cm  complex a planning fluid collection with an air-fluid level along the  right lateral aspect of the that when taking into consideration the  history is concerning for postoperative leak and abscess.    Small volume of fluid and localized thickening of the anterior renal  fascia, lateral coronal fascia is seen in the right hemiabdomen/right  lower quadrant. There is a 2.1 x 1.8 x 3.6 cm well-circumscribed  peripherally enhancing fluid collection lying along the lateral margin  of the right posterior renal fascia. There is a small 13 x 7 mm  nodular thickening involving the lateral conal fascia on the left  which has the appearance of a small peripherally enhancing fluid  collection, new when compared to the prior study.    Fat stranding is seen along the inferior aspect of the pannus. Small  volume of gas is seen within the periumbilical soft tissues at site of  previous trocar placement. Small fluid collection with bubble persists  in this location.    Inguinal lymph nodes are normal.     Bony structures: No acute abnormality.      Impression    IMPRESSION:   Findings concerning for dehiscence following recent appendectomy with  a 6.2 x 3.2 x 3.8 cm localized abscess in the right hemiabdomen.  Appendicolith appears to persist in the right lower quadrant.    Localized peritonitis of the right hemiabdomen with small fluid  collections concerning for small peritoneal abscesses involving both  the left and right hemiabdomen.    HESHAM CRAWFORD MD   UA with Microscopic reflex to Culture    Specimen: Midstream Urine   Result Value Ref Range    Color Urine Light Yellow     Appearance Urine Clear     Glucose Urine Negative NEG^Negative mg/dL    Bilirubin Urine Negative NEG^Negative    Ketones Urine Negative NEG^Negative mg/dL    Specific Gravity Urine 1.010 1.003 - 1.035     Blood Urine Small (A) NEG^Negative    pH Urine 6.5 4.7 - 8.0 pH    Protein Albumin Urine Negative NEG^Negative mg/dL    Urobilinogen mg/dL Normal 0.0 - 2.0 mg/dL    Nitrite Urine Negative NEG^Negative    Leukocyte Esterase Urine Negative NEG^Negative    Source Midstream Urine     WBC Urine 1 0 - 5 /HPF    RBC Urine 3 (H) 0 - 2 /HPF    Bacteria Urine None (A) NEG^Negative /HPF    Squamous Epithelial /HPF Urine 7 (H) 0 - 1 /HPF    Mucous Urine Present (A) NEG^Negative /LPF       Medications   lisinopril-hydrochlorothiazide (ZESTORETIC) 20-25 mg combo dose (has no administration in time range)   simvastatin (ZOCOR) tablet 20 mg (has no administration in time range)   lidocaine 1 % 1 mL (has no administration in time range)   lidocaine (LMX4) kit (has no administration in time range)   sodium chloride (PF) 0.9% PF flush 3 mL (has no administration in time range)   sodium chloride (PF) 0.9% PF flush 3 mL (3 mLs Intracatheter Given 11/29/20 1427)   enoxaparin ANTICOAGULANT (LOVENOX) injection 40 mg (has no administration in time range)   lactated ringers infusion (1,000 mLs Intravenous New Bag 11/29/20 1429)   acetaminophen (TYLENOL) tablet 650 mg (has no administration in time range)   HYDROmorphone (PF) (DILAUDID) injection 0.3-0.5 mg (has no administration in time range)   oxyCODONE (ROXICODONE) tablet 5-10 mg (has no administration in time range)   ondansetron (ZOFRAN-ODT) ODT tab 4 mg (has no administration in time range)     Or   ondansetron (ZOFRAN) injection 4 mg (has no administration in time range)   piperacillin-tazobactam (ZOSYN) intermittent infusion 4.5 g (has no administration in time range)   naloxone (NARCAN) injection 0.1-0.4 mg (has no administration in time range)   0.9% sodium chloride BOLUS (0 mLs Intravenous Stopped 11/29/20 1043)   enalaprilat (VASOTEC) injection 1.25 mg (1.25 mg Intravenous Given 11/29/20 1000)   piperacillin-tazobactam (ZOSYN) infusion 3.375 g (0 g Intravenous Stopped 11/29/20  1045)   sodium chloride (PF) 0.9% PF flush 60 mL (60 mLs Intravenous Given 11/29/20 1028)   iopamidol (ISOVUE-300) IV solution 61% 100 mL (100 mLs Intravenous Given 11/29/20 1029)   metroNIDAZOLE (FLAGYL) infusion 500 mg (0 mg Intravenous Stopped 11/29/20 1303)       Assessments & Plan (with Medical Decision Making)     I have revied previous op notes.  Performed independent history and exam.  I considered life threats.  Patient does not any pulmonary symptoms to suggest a PE.  There is no urinary symptoms such as UTI.  No leg pain or swelling to suggest DVT.  Surgical incisions appear benign.  My concern is intra-abdominal postoperative complication.  Above work-up confirm such.  Patient does have chronic hypertension and blood pressure responded nicely to interventions as above.    Plan: Admit to surgery Dr. Nayan JEFFERS [facility] who accepts primarily   Low covid risk. covid screen sent,    Repeat vitals.   /74   Pulse 89   Temp 99.1  F (37.3  C) (Oral)   Resp 18   SpO2 95%           Final diagnoses:   RLQ abdominal pain   Hypertension, unspecified type   Sepsis, due to unspecified organism, unspecified whether acute organ dysfunction present (H)   S/P appy   Suspected Postprocedural intraabdominal abscess, potential post surgical dehiscence      Due to the nature of electronic medical record, laboratory results, imaging results, diagnosis, other information and medications reported above may not represent information available to me at the the time of my care and disposition. Medications reported above may have not been ordered by me.      Portions of the record may have been created with voice recognition software. Occasional wrong-word or 'sound-a- like' substitution may have occurred due to the inherent limitations of voice recognition software. Though the chart has been reviewed, there may be inadvertent transcription errors. Read the chart carefully and recognize, using context, where substitutions have  occurred. ;e.   Additionally, punctuation or contextual errors can also be inadvertently present with voice recognition.    11/29/2020   [facility]     Manolo Henry MD  11/29/20 1151       Manolo Henry MD  11/29/20 6005

## 2020-11-29 NOTE — PLAN OF CARE
[facility] Inpatient Admission Note:    Patient admitted to 4202/4202-1 at approximately 1420 via wheel chair accompanied by other:alone from emergency room . Report received from Emily in SBAR format at 1415 via telephone. Patient ambulated to bed via self.. Patient is alert and oriented X 3, reports pain; rates at 3 on 0-10 scale.  Patient oriented to room, unit, hourly rounding, and plan of care. Explained admission packet and patient handbook with patient bill of rights brochure. VSS /74   Pulse 89   Temp 99.1  F (37.3  C) (Oral)   Resp 18   SpO2 95%     Will continue to monitor and document as needed.     Inpatient Nursing criteria listed below was met:  Health care directives status obtained and documented: Yes  Care Everywhere authorization obtained  NA  MRSA swab completed for patient 65 years and older: N/A  Patient identifies a surrogate decision maker: No    If initial lactic acid >2.0, repeat lactic acid drawn within one hour of arrival to unit: NA.   Clergy visit ordered if patient requests: N/A  Skin issues/needs documented: N/A  Isolation Patient: no. Isolation warning on chart, had negative covid swab prior to surgery, surgeon deems patient low risk for isolation/infectious.  Fall Prevention No  Care Plan initiated: Yes  Education Documented (including assessment): Yes  Patient has discharge needs : No

## 2020-11-30 ENCOUNTER — APPOINTMENT (OUTPATIENT)
Dept: CT IMAGING | Facility: HOSPITAL | Age: 55
DRG: 872 | End: 2020-11-30
Attending: SURGERY
Payer: COMMERCIAL

## 2020-11-30 LAB
ANION GAP SERPL CALCULATED.3IONS-SCNC: 6 MMOL/L (ref 3–14)
BACTERIA SPEC CULT: NORMAL
BACTERIA SPEC CULT: NORMAL
BUN SERPL-MCNC: 6 MG/DL (ref 7–30)
CALCIUM SERPL-MCNC: 8.8 MG/DL (ref 8.5–10.1)
CHLORIDE SERPL-SCNC: 100 MMOL/L (ref 94–109)
CO2 SERPL-SCNC: 32 MMOL/L (ref 20–32)
CREAT SERPL-MCNC: 0.64 MG/DL (ref 0.52–1.04)
ERYTHROCYTE [DISTWIDTH] IN BLOOD BY AUTOMATED COUNT: 13 % (ref 10–15)
GFR SERPL CREATININE-BSD FRML MDRD: >90 ML/MIN/{1.73_M2}
GLUCOSE SERPL-MCNC: 114 MG/DL (ref 70–99)
GRAM STN SPEC: ABNORMAL
GRAM STN SPEC: NORMAL
GRAM STN SPEC: NORMAL
HCT VFR BLD AUTO: 33 % (ref 35–47)
HGB BLD-MCNC: 11 G/DL (ref 11.7–15.7)
LACTATE BLD-SCNC: 1 MMOL/L (ref 0.7–2)
Lab: ABNORMAL
Lab: NORMAL
Lab: NORMAL
MCH RBC QN AUTO: 30.9 PG (ref 26.5–33)
MCHC RBC AUTO-ENTMCNC: 33.3 G/DL (ref 31.5–36.5)
MCV RBC AUTO: 93 FL (ref 78–100)
PLATELET # BLD AUTO: 320 10E9/L (ref 150–450)
POTASSIUM SERPL-SCNC: 2.7 MMOL/L (ref 3.4–5.3)
POTASSIUM SERPL-SCNC: 3.4 MMOL/L (ref 3.4–5.3)
POTASSIUM SERPL-SCNC: 3.5 MMOL/L (ref 3.4–5.3)
RBC # BLD AUTO: 3.56 10E12/L (ref 3.8–5.2)
SODIUM SERPL-SCNC: 138 MMOL/L (ref 133–144)
SPECIMEN SOURCE: ABNORMAL
SPECIMEN SOURCE: NORMAL
SPECIMEN SOURCE: NORMAL
WBC # BLD AUTO: 12.1 10E9/L (ref 4–11)

## 2020-11-30 PROCEDURE — 250N000011 HC RX IP 250 OP 636: Performed by: RADIOLOGY

## 2020-11-30 PROCEDURE — 87186 SC STD MICRODIL/AGAR DIL: CPT | Performed by: SURGERY

## 2020-11-30 PROCEDURE — 255N000002 HC RX 255 OP 636: Performed by: RADIOLOGY

## 2020-11-30 PROCEDURE — 250N000011 HC RX IP 250 OP 636: Performed by: SURGERY

## 2020-11-30 PROCEDURE — 84132 ASSAY OF SERUM POTASSIUM: CPT | Performed by: STUDENT IN AN ORGANIZED HEALTH CARE EDUCATION/TRAINING PROGRAM

## 2020-11-30 PROCEDURE — 120N000001 HC R&B MED SURG/OB

## 2020-11-30 PROCEDURE — 250N000009 HC RX 250: Performed by: RADIOLOGY

## 2020-11-30 PROCEDURE — 99232 SBSQ HOSP IP/OBS MODERATE 35: CPT | Mod: 24 | Performed by: SURGERY

## 2020-11-30 PROCEDURE — 250N000013 HC RX MED GY IP 250 OP 250 PS 637: Performed by: STUDENT IN AN ORGANIZED HEALTH CARE EDUCATION/TRAINING PROGRAM

## 2020-11-30 PROCEDURE — 85027 COMPLETE CBC AUTOMATED: CPT | Performed by: STUDENT IN AN ORGANIZED HEALTH CARE EDUCATION/TRAINING PROGRAM

## 2020-11-30 PROCEDURE — 80048 BASIC METABOLIC PNL TOTAL CA: CPT | Performed by: STUDENT IN AN ORGANIZED HEALTH CARE EDUCATION/TRAINING PROGRAM

## 2020-11-30 PROCEDURE — 250N000011 HC RX IP 250 OP 636: Performed by: STUDENT IN AN ORGANIZED HEALTH CARE EDUCATION/TRAINING PROGRAM

## 2020-11-30 PROCEDURE — 87205 SMEAR GRAM STAIN: CPT | Performed by: SURGERY

## 2020-11-30 PROCEDURE — 258N000003 HC RX IP 258 OP 636: Performed by: STUDENT IN AN ORGANIZED HEALTH CARE EDUCATION/TRAINING PROGRAM

## 2020-11-30 PROCEDURE — 87075 CULTR BACTERIA EXCEPT BLOOD: CPT | Performed by: SURGERY

## 2020-11-30 PROCEDURE — 36415 COLL VENOUS BLD VENIPUNCTURE: CPT | Performed by: STUDENT IN AN ORGANIZED HEALTH CARE EDUCATION/TRAINING PROGRAM

## 2020-11-30 PROCEDURE — 87077 CULTURE AEROBIC IDENTIFY: CPT | Performed by: SURGERY

## 2020-11-30 PROCEDURE — 83605 ASSAY OF LACTIC ACID: CPT | Performed by: STUDENT IN AN ORGANIZED HEALTH CARE EDUCATION/TRAINING PROGRAM

## 2020-11-30 PROCEDURE — 87070 CULTURE OTHR SPECIMN AEROBIC: CPT | Performed by: SURGERY

## 2020-11-30 PROCEDURE — 49406 IMAGE CATH FLUID PERI/RETRO: CPT

## 2020-11-30 PROCEDURE — 0W9G30Z DRAINAGE OF PERITONEAL CAVITY WITH DRAINAGE DEVICE, PERCUTANEOUS APPROACH: ICD-10-PCS | Performed by: RADIOLOGY

## 2020-11-30 PROCEDURE — 250N000009 HC RX 250: Performed by: STUDENT IN AN ORGANIZED HEALTH CARE EDUCATION/TRAINING PROGRAM

## 2020-11-30 RX ORDER — POTASSIUM CHLORIDE 7.45 MG/ML
10 INJECTION INTRAVENOUS
Status: DISCONTINUED | OUTPATIENT
Start: 2020-11-30 | End: 2020-11-30

## 2020-11-30 RX ORDER — MORPHINE SULFATE 4 MG/ML
2-4 INJECTION, SOLUTION INTRAMUSCULAR; INTRAVENOUS
Status: DISCONTINUED | OUTPATIENT
Start: 2020-11-30 | End: 2020-11-30

## 2020-11-30 RX ORDER — MORPHINE SULFATE 4 MG/ML
INJECTION, SOLUTION INTRAMUSCULAR; INTRAVENOUS
Status: DISCONTINUED
Start: 2020-11-30 | End: 2020-11-30 | Stop reason: HOSPADM

## 2020-11-30 RX ORDER — LIDOCAINE HYDROCHLORIDE 10 MG/ML
10 INJECTION, SOLUTION EPIDURAL; INFILTRATION; INTRACAUDAL; PERINEURAL ONCE
Status: COMPLETED | OUTPATIENT
Start: 2020-11-30 | End: 2020-11-30

## 2020-11-30 RX ORDER — MORPHINE SULFATE 4 MG/ML
2-4 INJECTION, SOLUTION INTRAMUSCULAR; INTRAVENOUS
Status: CANCELLED | OUTPATIENT
Start: 2020-11-30

## 2020-11-30 RX ORDER — POTASSIUM CHLORIDE 7.45 MG/ML
10 INJECTION INTRAVENOUS
Status: DISPENSED | OUTPATIENT
Start: 2020-11-30 | End: 2020-11-30

## 2020-11-30 RX ORDER — IOPAMIDOL 612 MG/ML
50 INJECTION, SOLUTION INTRAVASCULAR ONCE
Status: COMPLETED | OUTPATIENT
Start: 2020-11-30 | End: 2020-11-30

## 2020-11-30 RX ORDER — POTASSIUM CHLORIDE 1500 MG/1
40 TABLET, EXTENDED RELEASE ORAL ONCE
Status: COMPLETED | OUTPATIENT
Start: 2020-11-30 | End: 2020-11-30

## 2020-11-30 RX ORDER — LIDOCAINE HYDROCHLORIDE 10 MG/ML
INJECTION, SOLUTION EPIDURAL; INFILTRATION; INTRACAUDAL; PERINEURAL
Status: DISCONTINUED
Start: 2020-11-30 | End: 2020-11-30 | Stop reason: HOSPADM

## 2020-11-30 RX ADMIN — ACETAMINOPHEN 650 MG: 325 TABLET, FILM COATED ORAL at 19:52

## 2020-11-30 RX ADMIN — OXYCODONE HYDROCHLORIDE 10 MG: 5 TABLET ORAL at 15:44

## 2020-11-30 RX ADMIN — LIDOCAINE HYDROCHLORIDE 10 ML: 10 INJECTION, SOLUTION EPIDURAL; INFILTRATION; INTRACAUDAL; PERINEURAL at 12:09

## 2020-11-30 RX ADMIN — OXYCODONE HYDROCHLORIDE 5 MG: 5 TABLET ORAL at 04:23

## 2020-11-30 RX ADMIN — POTASSIUM CHLORIDE 10 MEQ: 7.46 INJECTION, SOLUTION INTRAVENOUS at 08:11

## 2020-11-30 RX ADMIN — POTASSIUM CHLORIDE 40 MEQ: 1500 TABLET, EXTENDED RELEASE ORAL at 17:04

## 2020-11-30 RX ADMIN — TAZOBACTAM SODIUM AND PIPERACILLIN SODIUM 4.5 G: 500; 4 INJECTION, SOLUTION INTRAVENOUS at 21:29

## 2020-11-30 RX ADMIN — SODIUM CHLORIDE, POTASSIUM CHLORIDE, SODIUM LACTATE AND CALCIUM CHLORIDE 1000 ML: 600; 310; 30; 20 INJECTION, SOLUTION INTRAVENOUS at 12:50

## 2020-11-30 RX ADMIN — TAZOBACTAM SODIUM AND PIPERACILLIN SODIUM 4.5 G: 500; 4 INJECTION, SOLUTION INTRAVENOUS at 10:14

## 2020-11-30 RX ADMIN — MORPHINE SULFATE 2 MG: 4 INJECTION, SOLUTION INTRAMUSCULAR; INTRAVENOUS at 11:48

## 2020-11-30 RX ADMIN — OXYCODONE HYDROCHLORIDE 10 MG: 5 TABLET ORAL at 23:55

## 2020-11-30 RX ADMIN — LISINOPRIL: 10 TABLET ORAL at 08:27

## 2020-11-30 RX ADMIN — TAZOBACTAM SODIUM AND PIPERACILLIN SODIUM 4.5 G: 500; 4 INJECTION, SOLUTION INTRAVENOUS at 15:36

## 2020-11-30 RX ADMIN — ACETAMINOPHEN 650 MG: 325 TABLET, FILM COATED ORAL at 08:36

## 2020-11-30 RX ADMIN — OXYCODONE HYDROCHLORIDE 5 MG: 5 TABLET ORAL at 10:28

## 2020-11-30 RX ADMIN — ACETAMINOPHEN 650 MG: 325 TABLET, FILM COATED ORAL at 04:23

## 2020-11-30 RX ADMIN — OXYCODONE HYDROCHLORIDE 5 MG: 5 TABLET ORAL at 00:26

## 2020-11-30 RX ADMIN — POTASSIUM CHLORIDE 40 MEQ: 1500 TABLET, EXTENDED RELEASE ORAL at 06:25

## 2020-11-30 RX ADMIN — MORPHINE SULFATE 2 MG: 4 INJECTION, SOLUTION INTRAMUSCULAR; INTRAVENOUS at 11:42

## 2020-11-30 RX ADMIN — SIMVASTATIN 20 MG: 20 TABLET, FILM COATED ORAL at 21:29

## 2020-11-30 RX ADMIN — POTASSIUM CHLORIDE 10 MEQ: 7.46 INJECTION, SOLUTION INTRAVENOUS at 12:54

## 2020-11-30 RX ADMIN — ACETAMINOPHEN 650 MG: 325 TABLET, FILM COATED ORAL at 00:29

## 2020-11-30 RX ADMIN — TAZOBACTAM SODIUM AND PIPERACILLIN SODIUM 4.5 G: 500; 4 INJECTION, SOLUTION INTRAVENOUS at 04:23

## 2020-11-30 RX ADMIN — HYDROMORPHONE HYDROCHLORIDE 0.3 MG: 1 INJECTION, SOLUTION INTRAMUSCULAR; INTRAVENOUS; SUBCUTANEOUS at 12:46

## 2020-11-30 RX ADMIN — OXYCODONE HYDROCHLORIDE 10 MG: 5 TABLET ORAL at 19:52

## 2020-11-30 RX ADMIN — IOPAMIDOL 10 ML: 612 INJECTION, SOLUTION INTRAVENOUS at 12:11

## 2020-11-30 RX ADMIN — SODIUM CHLORIDE, POTASSIUM CHLORIDE, SODIUM LACTATE AND CALCIUM CHLORIDE 1000 ML: 600; 310; 30; 20 INJECTION, SOLUTION INTRAVENOUS at 00:34

## 2020-11-30 ASSESSMENT — ACTIVITIES OF DAILY LIVING (ADL)
ADLS_ACUITY_SCORE: 14

## 2020-11-30 NOTE — PROVIDER NOTIFICATION
DATE:  11/30/2020   TIME OF RECEIPT FROM LAB:  0555  LAB TEST:  potassium  LAB VALUE:  2.7  RESULTS GIVEN WITH READ-BACK TO (PROVIDER):  Nayan Bowser MD  TIME LAB VALUE REPORTED TO PROVIDER:   0600    MD placing potassium protocol orders for patient. Bedside nurse updated

## 2020-11-30 NOTE — PROGRESS NOTES
Briefly checked in with Rosalie.  No questions or concerns.     Medical record and Sukhi Score reviewed. Participated in interdisciplinary rounds.  No apparent needs noted at this time. Care Transitions will remain available if needs arise.

## 2020-11-30 NOTE — PLAN OF CARE
Prior to Admission Medication Reconciliation:     Medications added:   [x] None  [] As listed below:    Medications deleted:   [] None  [x] As listed below:    Flagyl- 5 day therapy complete.     Changes made to existing medications:   [x] None  [] As listed below:    Last times/dates taken verified with patient:  [x] Yes- completed myself  [] Nurse completed no changes made  [] Unable to review with patient:  [] Nurse completed/changes made:     Allergy modifications:    [x]Did not review  []Patient verified NKA  []Patient verified current existing allergies: no changes made  []New allergies: listed below      Medication reconciliation sources:   [x]Patient  []Patient family member/emergency contact: **  [][facility] Report Review  []Epic Chart Review  []Care Everywhere review  []Pharmacy med list: **  []Pharmacy phone call  [x]Outside meds dispense report: see below  []Nursing home or Assisted Living MAR:  []Other: **    Pharmacy desired at discharge: [facility]    Is patient on coumadin?  [x]No      Requests for consultation by provider or pharmacist:   [x] Patient understands why all of their meds were prescribed and how to take them. No questions.   [x] Fill dates coincide with compliancy for all maintenance meds.   [] Fill dates do not coincide with compliancy with maintenance meds. See notes in PTA medlist about how patient is taking.   [] Patient has questions about the following:      Comments: pt alert and oriented. Manages her own medications. No concerns.    Annvivian Vitaltruman on 11/30/2020 at 7:58 AM       Discrepancies: [x] No []Not Applicable []Yes: listed below      Time spent on medication reconciliation:   [x]5-20 mins  []20-40 mins  []> 40 mins    Issues completing PTA medication reconciliation:  [] On hold for a long time  [] Waited for a call back  [] Fax didn't come through  [] Fax took a long time  [] Other:    Notifying appropriate party of changes/additions/discrepancies:  [x]No pertinent changes  made, notification not necessary.   [] Notified attending provider via text page  [] Notified attending provider in person  [] Notified pharmacy  [] Notified nurse  [] Attending provider not available, left detailed notes  [] Changes/additions made don't need provider notification because provider has not seen patient or input any orders  [] Changes/additions made don't need provider notification because changes made are to medications not ordered    Medications Prior to Admission   Medication Sig Dispense Refill Last Dose     Calcium Citrate-Vitamin D (CALCIUM + D PO) Take 1 tablet by mouth daily    Past Month at Unknown time     fish oil-omega-3 fatty acids 1000 MG capsule Take 1 capsule by mouth daily   Past Month at Unknown time     HYDROcodone-acetaminophen (NORCO) 5-325 MG Take 1 tablet by mouth every 6 hours as needed for moderate to severe pain 6 tablet 0 Past Week at Unknown time     ibuprofen (ADVIL/MOTRIN) 600 MG tablet Take 1 tablet (600 mg) by mouth every 6 hours as needed for pain (mild) 30 tablet 0 Past Week at Unknown time     lisinopril-hydrochlorothiazide (ZESTORETIC) 20-25 MG tablet TAKE 1 TABLET BY MOUTH DAILY 90 tablet 0 11/28/2020 at Unknown time     multivitamin, therapeutic with minerals (MULTI-VITAMIN) TABS tablet Take 1 tablet by mouth daily   Past Month at Unknown time     oxyCODONE-acetaminophen (PERCOCET) 5-325 MG tablet Take 1-2 tablets by mouth every 4 hours as needed for pain (moderate to severe) 12 tablet 0 Past Week at Unknown time     senna-docusate (SENOKOT-S/PERICOLACE) 8.6-50 MG tablet Take 1-2 tablets by mouth 2 times daily Take while on oral narcotics to prevent or treat constipation. 30 tablet 0 Past Week at Unknown time     naproxen sodium (ALEVE) 220 MG tablet Take 440 mg by mouth daily    11/28/2020     simvastatin (ZOCOR) 20 MG tablet TAKE 1 TABLET(20 MG) BY MOUTH AT BEDTIME 90 tablet 1 11/28/2020 at        Medication Dispense History (from 12/1/2019 to  11/29/2020)  Expand All  Collapse All  Ciprofloxacin HCl   Dispensed Days Supply Quantity Provider Pharmacy   CIPROFLOXACIN 500MG TABLETS 11/23/2020 5 10 Units ONELRACHEL BELL [email or web address] #...         Ibuprofen   Dispensed Days Supply Quantity Provider Pharmacy   IBUPROFEN 600MG TABLETS 11/23/2020 8 30 Units PAT SYKESFIELD [facility] #...         Influenza Vac Subunit Quad   Dispensed Days Supply Quantity Provider Pharmacy   FLUCELVAX PFS 2020-21 INJ 0.5ML 10/28/2020 1 0.5 mL RUBEN BERGER [email or web address] #...         Lisinopril-hydroCHLOROthiazide   Dispensed Days Supply Quantity Provider Pharmacy   LISINOPRIL-HCTZ 20/25MG TABLETS 10/08/2020 90 90 Units [facility] #...   LISINOPRIL-HCTZ 20/25MG TABLETS 07/08/2020 90 90 Units [facility] #...   LISINOPRIL-HCTZ 20/25MG TABLETS 04/09/2020 90 90 Units [facility] #...   LISINOPRIL-HCTZ 20/25MG TABLETS 01/13/2020 90 90 Units [facility] #...   LISINOPRIL-HCTZ 20/25MG TABLETS 12/03/2019 30 30 each [facility] #...         Simvastatin   Dispensed Days Supply Quantity Provider Pharmacy   SIMVASTATIN 20MG TABLETS 11/09/2020 90 90 Units [facility] #...   SIMVASTATIN 20MG TABLETS 08/06/2020 90 90 Units [facility] #...   SIMVASTATIN 20MG TABLETS 05/04/2020 90 90 Units [facility] #...   SIMVASTATIN 20MG TABLETS 02/09/2020 90 90 Units [facility] #...   SIMVASTATIN 20MG TABLETS 01/10/2020 30 30 Units PolitapollGUIDO DAVID [email or web address] #...   SIMVASTATIN 20MG TABLETS 12/03/2019 30 30 each GUIDO FREITAS [email or web address] #...         Other   Dispensed Days Supply Quantity Provider Pharmacy   SENNA S 8.6-50MG TABLETS 11/23/2020 8 30 Units  RACHEL SYKES BELL [facility] #...         metroNIDAZOLE   Dispensed Days Supply Quantity Provider Pharmacy   METRONIDAZOLE 500MG TABLETS 11/23/2020 5 10 Units RACHEL SYKES [facility] #...         oxyCODONE-Acetaminophen   Dispensed Days Supply Quantity Provider Pharmacy   OXYCODONE/ACETAMINOPHEN 5-325MG TAB 11/23/2020 2 12 Units RACHEL SYKES BELL [facility] #...

## 2020-11-30 NOTE — PROGRESS NOTES
Face to face report given with opportunity to observe patient.    Report given to Shannon Perez RN PAMELA B GELLERSTEDT, RN   11/30/2020  2:07 PM

## 2020-11-30 NOTE — PROGRESS NOTES
[facility] Progress Note    S: She continues to have constant pain in her RLQ. She is having low grade temps. She is NPO.     # Pain Assessment:  Current Pain Score 2020   Patient currently in pain? yes   Pain score (0-10) -       O:   Vitals:  BP  Min: 139/77  Max: 195/93  Temp  Av.8  F (37.7  C)  Min: 98.6  F (37  C)  Max: 101.8  F (38.8  C)  Pulse  Av.3  Min: 77  Max: 99  I/O last 3 completed shifts:  In: 2307 [P.O.:600; I.V.:1707]  Out: 2400 [Urine:2400]    Peripheral IV 20 Left Upper forearm (Active)   Site Assessment WDL 20   Line Status Infusing 20   Phlebitis Scale 0-->no symptoms 20   Infiltration Scale 0 20   Number of days: 1       Incision/Surgical Site 20 Abdomen (Active)   Incision Assessment WDL 20   Closure Liquid bandage 20   Incision Drainage Amount None 20   Dressing Intervention Open to air / No Dressing 20   Number of days: 8     No line/device    Physical Exam:  General: alert oriented, no acute distress   ENT: sclera non-icteric   Pulmonary: no respiratory distress   CVS: RRR  ABD: non-distended   Extremities: WWP     Assessment/Plan:  55 y.o. female presents with apparent abscess following laparoscopic appendectomy aprox 1 week ago. There is a fecalith in the vicinity that almost appears intraluminal as the base of the appendix may still be present as the staple line is remote from this site and appears similar to the initial presenting CT scan, this may though just be induration. I would like to trial a drain with possible sinogram at time to better eval and control sepsis. If gain control in this regard will plan on leaving drain in place with possible interval exploration once inflammation has subsided as to avoid a larger operation. If the drain does not control things will need further operative management.     Albert Fernandez MD

## 2020-11-30 NOTE — PLAN OF CARE
Patient arrived from OB to room at approx 2000. Patient stable at this time, HTN present but pain was increased and was up moving around at the time of measurement. Patient has 3 trochar sites, closed with liquid bandage. No drainage present. Tender to the touch. Oxicodone & Tylenol given x1. NPO after 0000. Up independently in room, call light in reach.      Face to face report given with opportunity to observe patient.    Report given to JOHN Bravo   11/29/2020  11:06 PM

## 2020-11-30 NOTE — PROGRESS NOTES
Procedure: drain - abdomen peritoneum , right    There were  no complications and patient has no symptoms..      Tolerated procedure well.    Radiologist:Dr. Fontenot    Time Out: Prior to the start of the procedure and with procedural staff participation, I verbally confirmed the patient s identity using two indicators, relevant allergies, that the procedure was appropriate and matched the consent or emergent situation, and that the correct equipment/implants were available. Immediately prior to starting the procedure I conducted the Time Out with the procedural staff and re-confirmed the patient s name, procedure, and site/side. (The Joint Commission universal protocol was followed.)  Yes    Position:  supine    Pain:  6    Sedation:None. Local Anesthestic used  No sedation but used 4 mg of morphine for pain.    Estimated Blood Loss: Minimal     Condition: Stable    Comments: See dictated procedure note for full details     DEANDRE SAM RN

## 2020-11-30 NOTE — PLAN OF CARE
Pt appears to have rested during the shift. Alert, oriented and pleasant while awake. BP elevated at 177/97 although was having pain with assessment and with 2nd assessment went down to 164/88.. Max temp of 101.8 and was medicated with tylenol x 2 although pt states she wasn't symptomatic. Pt was having pinching and burning pain 3 1/2-4/10 in the RLQ in the beginning of the shift. Medicated with 5mg oxycodone x 2 this shift and ice pack given for comfort. Pt stated ice back helped with her back and pain medicine does help for a bit.  NPO after medications given at 0029. 3 trochar sites are CDI. RLQ appears to be slightly more warm then left side of abdomen. Up independently in room and steady on her feet. IV running LR @ 100ml/hr without difficulty. Call light within reach and makes needs known. Potassium came back at 2.7 this am. MD ordered replacement.     Face to face report given with opportunity to observe patient.    Report given to Diane LOPEZ and Leah Christina RN   11/30/2020  8:09 AM

## 2020-11-30 NOTE — PROVIDER NOTIFICATION
Called to clarify potassium order of 40 MEQ followed by 2 doses of IV potassium. Give as directed per MD.

## 2020-11-30 NOTE — PHARMACY-MEDICATION REGIMEN REVIEW
[facility]    Pharmacy      Antimicrobial Stewardship Note     Current antimicrobial therapy:  Anti-infectives (From now, onward)    Start     Dose/Rate Route Frequency Ordered Stop    11/29/20 1600  piperacillin-tazobactam (ZOSYN) intermittent infusion 4.5 g      4.5 g  over 30 Minutes Intravenous EVERY 6 HOURS 11/29/20 1416            Indication: Abscess/Sepsis    Days of Therapy: 2     Pertinent labs:  Creatinine   Creatinine   Date Value Ref [facility] Status   11/30/2020 0.64 0.52 - 1.04 mg/dL Final   11/29/2020 0.66 0.52 - 1.04 mg/dL Final   11/29/2020 0.60 0.52 - 1.04 mg/dL Final     WBC   WBC   Date Value Ref Range Status   11/30/2020 12.1 (H) 4.0 - 11.0 10e9/L Final   11/29/2020 13.9 (H) 4.0 - 11.0 10e9/L Final   Procalcitonin   Procalcitonin   Date Value Ref Range Status   11/29/2020 0.28 ng/ml Final     Comment:     0.25-0.49 ng/ml  Possible early systemic infection or localized infection.     Recommendation: Encourage antibiotics only in the correct clinical context.   Consider obtaining blood cultures or other relevant cultures. Recheck PCT in   6-12 hours to ensure baseline low level. If repeat PCT is rising, consider   early systemic infection and consider starting antibiotics.       Culture Results:   (11/29/20) Blood  (11/29/20) COVID = negative    (11/22/20) Blood = negative     Recommendations/Interventions:  1. If drain placed, obtain cultures of drain contents    Keith Camargo, [facility]  November 30, 2020

## 2020-11-30 NOTE — PLAN OF CARE
Face to face report given with opportunity to observe patient.    Report given to Acute Care RN for transfer, pt sent with UA via wheelchair to acute care room 0289.    Laura Black RN   11/29/2020  7:55 PM

## 2020-11-30 NOTE — PLAN OF CARE
Patient c/o abdomen pain, RLQ. Med given: Dilaudid 0.3 mg, Tylenol 650 mg, Roxicodone 5 mg.  Off unit for drain placement [id number]. Bulb output 20 ml at 1250, thin, bright red fluid with one clot.  LR running at 100 ml/hr left forearm.  Potassium replacements: 10 mEq in 100 mL sterile water infusion twice on shift. Lab check scheduled for 1500.  A&O, independent in room, call light in reach, using appropriately.  Face to face report given with opportunity to observe patient.    Report given to JOHN Zuluaga RN   11/30/2020  3:13 PM

## 2020-11-30 NOTE — PLAN OF CARE
Conversation with Tiffany in pharmacy regarding potassium orders this shift.  Explained that patient had result of 2.7 on midnight shift which the nurse received the order for 40 mEq of oral and 20 mEq of IV potassium.  Oral dose given on night shift.  First dose of IV was given approximately at 0800.  Zosyn was given prior to second dose of IV.  Down for drain placement prior to 2nd dose given.  Dr. Fernandez ordered replacement protocol this AM, which added 4 doses of IV potassium to MAR this morning.  On consulting with pharmacy, Tiffany (pharmacist) agreed that a repeat lab result should take place prior to starting the 4 IV doses per replacement protocol.  Repeat lab ordered for 1500.

## 2020-12-01 ENCOUNTER — DOCUMENTATION ONLY (OUTPATIENT)
Dept: INTERVENTIONAL RADIOLOGY/VASCULAR | Facility: HOSPITAL | Age: 55
End: 2020-12-01

## 2020-12-01 LAB
ANION GAP SERPL CALCULATED.3IONS-SCNC: 9 MMOL/L (ref 3–14)
BUN SERPL-MCNC: 7 MG/DL (ref 7–30)
CALCIUM SERPL-MCNC: 8.7 MG/DL (ref 8.5–10.1)
CHLORIDE SERPL-SCNC: 99 MMOL/L (ref 94–109)
CO2 SERPL-SCNC: 27 MMOL/L (ref 20–32)
CREAT SERPL-MCNC: 0.67 MG/DL (ref 0.52–1.04)
ERYTHROCYTE [DISTWIDTH] IN BLOOD BY AUTOMATED COUNT: 13 % (ref 10–15)
GFR SERPL CREATININE-BSD FRML MDRD: >90 ML/MIN/{1.73_M2}
GLUCOSE SERPL-MCNC: 105 MG/DL (ref 70–99)
HCT VFR BLD AUTO: 37.2 % (ref 35–47)
HGB BLD-MCNC: 12.1 G/DL (ref 11.7–15.7)
LACTATE BLD-SCNC: 0.8 MMOL/L (ref 0.7–2)
MCH RBC QN AUTO: 31.3 PG (ref 26.5–33)
MCHC RBC AUTO-ENTMCNC: 32.5 G/DL (ref 31.5–36.5)
MCV RBC AUTO: 96 FL (ref 78–100)
PLATELET # BLD AUTO: 375 10E9/L (ref 150–450)
POTASSIUM SERPL-SCNC: 3.2 MMOL/L (ref 3.4–5.3)
POTASSIUM SERPL-SCNC: 3.4 MMOL/L (ref 3.4–5.3)
POTASSIUM SERPL-SCNC: 3.8 MMOL/L (ref 3.4–5.3)
RBC # BLD AUTO: 3.87 10E12/L (ref 3.8–5.2)
SODIUM SERPL-SCNC: 135 MMOL/L (ref 133–144)
WBC # BLD AUTO: 13.5 10E9/L (ref 4–11)

## 2020-12-01 PROCEDURE — 258N000003 HC RX IP 258 OP 636: Performed by: STUDENT IN AN ORGANIZED HEALTH CARE EDUCATION/TRAINING PROGRAM

## 2020-12-01 PROCEDURE — 250N000011 HC RX IP 250 OP 636: Performed by: STUDENT IN AN ORGANIZED HEALTH CARE EDUCATION/TRAINING PROGRAM

## 2020-12-01 PROCEDURE — 83605 ASSAY OF LACTIC ACID: CPT | Performed by: STUDENT IN AN ORGANIZED HEALTH CARE EDUCATION/TRAINING PROGRAM

## 2020-12-01 PROCEDURE — 250N000013 HC RX MED GY IP 250 OP 250 PS 637: Performed by: SURGERY

## 2020-12-01 PROCEDURE — 36415 COLL VENOUS BLD VENIPUNCTURE: CPT | Performed by: STUDENT IN AN ORGANIZED HEALTH CARE EDUCATION/TRAINING PROGRAM

## 2020-12-01 PROCEDURE — 99231 SBSQ HOSP IP/OBS SF/LOW 25: CPT | Mod: 24 | Performed by: SURGERY

## 2020-12-01 PROCEDURE — 120N000001 HC R&B MED SURG/OB

## 2020-12-01 PROCEDURE — 84132 ASSAY OF SERUM POTASSIUM: CPT | Performed by: STUDENT IN AN ORGANIZED HEALTH CARE EDUCATION/TRAINING PROGRAM

## 2020-12-01 PROCEDURE — 85027 COMPLETE CBC AUTOMATED: CPT | Performed by: SURGERY

## 2020-12-01 PROCEDURE — 36415 COLL VENOUS BLD VENIPUNCTURE: CPT | Performed by: SURGERY

## 2020-12-01 PROCEDURE — 36416 COLLJ CAPILLARY BLOOD SPEC: CPT | Performed by: STUDENT IN AN ORGANIZED HEALTH CARE EDUCATION/TRAINING PROGRAM

## 2020-12-01 PROCEDURE — 80048 BASIC METABOLIC PNL TOTAL CA: CPT | Performed by: SURGERY

## 2020-12-01 PROCEDURE — 250N000013 HC RX MED GY IP 250 OP 250 PS 637: Performed by: STUDENT IN AN ORGANIZED HEALTH CARE EDUCATION/TRAINING PROGRAM

## 2020-12-01 RX ORDER — POTASSIUM CHLORIDE 1500 MG/1
40 TABLET, EXTENDED RELEASE ORAL ONCE
Status: COMPLETED | OUTPATIENT
Start: 2020-12-01 | End: 2020-12-01

## 2020-12-01 RX ORDER — SODIUM CHLORIDE, SODIUM LACTATE, POTASSIUM CHLORIDE, CALCIUM CHLORIDE 600; 310; 30; 20 MG/100ML; MG/100ML; MG/100ML; MG/100ML
1000 INJECTION, SOLUTION INTRAVENOUS CONTINUOUS
Status: DISCONTINUED | OUTPATIENT
Start: 2020-12-01 | End: 2020-12-02

## 2020-12-01 RX ADMIN — TAZOBACTAM SODIUM AND PIPERACILLIN SODIUM 4.5 G: 500; 4 INJECTION, SOLUTION INTRAVENOUS at 09:53

## 2020-12-01 RX ADMIN — SODIUM CHLORIDE, POTASSIUM CHLORIDE, SODIUM LACTATE AND CALCIUM CHLORIDE 1000 ML: 600; 310; 30; 20 INJECTION, SOLUTION INTRAVENOUS at 13:21

## 2020-12-01 RX ADMIN — OXYCODONE HYDROCHLORIDE 10 MG: 5 TABLET ORAL at 21:12

## 2020-12-01 RX ADMIN — SIMVASTATIN 20 MG: 20 TABLET, FILM COATED ORAL at 21:13

## 2020-12-01 RX ADMIN — OXYCODONE HYDROCHLORIDE 10 MG: 5 TABLET ORAL at 16:38

## 2020-12-01 RX ADMIN — ACETAMINOPHEN 650 MG: 325 TABLET, FILM COATED ORAL at 04:16

## 2020-12-01 RX ADMIN — LISINOPRIL: 10 TABLET ORAL at 08:12

## 2020-12-01 RX ADMIN — OXYCODONE HYDROCHLORIDE 10 MG: 5 TABLET ORAL at 08:54

## 2020-12-01 RX ADMIN — POTASSIUM CHLORIDE 40 MEQ: 1500 TABLET, EXTENDED RELEASE ORAL at 16:35

## 2020-12-01 RX ADMIN — TAZOBACTAM SODIUM AND PIPERACILLIN SODIUM 4.5 G: 500; 4 INJECTION, SOLUTION INTRAVENOUS at 16:32

## 2020-12-01 RX ADMIN — SODIUM CHLORIDE, POTASSIUM CHLORIDE, SODIUM LACTATE AND CALCIUM CHLORIDE 1000 ML: 600; 310; 30; 20 INJECTION, SOLUTION INTRAVENOUS at 01:09

## 2020-12-01 RX ADMIN — TAZOBACTAM SODIUM AND PIPERACILLIN SODIUM 4.5 G: 500; 4 INJECTION, SOLUTION INTRAVENOUS at 04:16

## 2020-12-01 RX ADMIN — OXYCODONE HYDROCHLORIDE 10 MG: 5 TABLET ORAL at 04:16

## 2020-12-01 RX ADMIN — TAZOBACTAM SODIUM AND PIPERACILLIN SODIUM 4.5 G: 500; 4 INJECTION, SOLUTION INTRAVENOUS at 21:37

## 2020-12-01 RX ADMIN — OXYCODONE HYDROCHLORIDE 10 MG: 5 TABLET ORAL at 11:50

## 2020-12-01 RX ADMIN — POTASSIUM CHLORIDE 40 MEQ: 1500 TABLET, EXTENDED RELEASE ORAL at 08:13

## 2020-12-01 ASSESSMENT — ACTIVITIES OF DAILY LIVING (ADL)
ADLS_ACUITY_SCORE: 14

## 2020-12-01 NOTE — PLAN OF CARE
"/86 (BP Location: Right arm)   Pulse 91   Temp 100.3  F (37.9  C) (Tympanic)   Resp 18   Ht 1.6 m (5' 3\")   Wt 108 kg (238 lb 1.6 oz)   SpO2 93%   BMI 42.18 kg/m      LR running at 100 mL/hr. IV Zosyn. A&O w/ elevated SBPs 160s. Temps elevated. 99.5-100.3. Managed w/ PO tylenol x1. Pain to abd 4-6/10 managed w/ 10 mg PO roxicodone x2. BSs audible and normoactive w/ loose stool x2. ABD is tender to RLQ. HAIR dressing remains CDI. Troch sites x3 remain open to air. MD at bedside this AM to strip HAIR. Output serosanguinous. Voiding w/o difficulty. Remains on a clear liquid diet. Currently no N&T to BUEs dt carpal tunnel, but pt states it occurs off and on. Bed in lowest position. Call light in reach. ID band in place. Makes needs known.     Face to face report given with opportunity to observe patient.    Report given to Jo ALVARENGA & Pamela RNs    Tommy Garcia RN   12/1/2020  7:50 AM      "

## 2020-12-01 NOTE — PLAN OF CARE
Face to face report given with opportunity to observe patient.  Report given to JOHN Zuluaga.    Pamela Camacho RN  12/1/2020, 3:33 PM

## 2020-12-01 NOTE — PROGRESS NOTES
[facility] Progress Note    S: Still having low grade temps, pain unchanged. Had drain placed by interventional radiology.     # Pain Assessment:  Current Pain Score 2020   Patient currently in pain? yes   Pain score (0-10) -   multimodal pain medication available.     O:   Vitals:  BP  Min: 132/66  Max: 166/86  Temp  Av.2  F (37.9  C)  Min: 99.5  F (37.5  C)  Max: 101.9  F (38.8  C)  Pulse  Av.5  Min: 84  Max: 94  I/O last 3 completed shifts:  In: 2276 [P.O.:120; I.V.:2156]  Out: 2490 [Urine:1950; Drains:40; Other:500]    Peripheral IV 20 Right Lower forearm (Active)   Site Assessment [facility] 20   Line Status Infusing;Checked every 1-2 hour 20   Dressing Intervention New dressing  20   Phlebitis Scale 0-->no symptoms 20   Infiltration Scale 0 20   Number of days: 0       Closed/Suction Drain Right;Anterior Perineal Bulb (Active)   Site Description [facility] 20   Dressing Status Normal: Clean, Dry & Intact 20   Drainage Appearance Serosanguenous;Thin 20   Status To bulb suction 20   Output (ml) 20 ml 20   Number of days: 1       Incision/Surgical Site 20 Abdomen (Active)   Incision Assessment [facility] 20   Closure Liquid bandage 20   Incision Drainage Amount None 20   Dressing Intervention Open to air / No Dressing 20   Number of days: 9     No line/device    Physical Exam:  General: alert oriented, no acute distress   ENT: sclera non-icteric   Pulmonary: no respiratory distress   CVS: RRR  ABD: obese, tender on right side, there is sanguinous fluid in drain tubing.   Extremities: WWP     Assessment/Plan:  55 y.o. female with post-op abscess following laparoscopic appendectomy. IR drain placed with good amount aspirated. Pigtail left behind. She is clinically unchanged. Cultures pending. Discussed giving some time for drain to work. Ok to let eat.      Albert Fernandez MD

## 2020-12-01 NOTE — TELEPHONE ENCOUNTER
Patient drain is working well per JOHN Osorio. Checked on patient and she denied pain, infection, or any issues with the drain. Patient said she is doing fine

## 2020-12-01 NOTE — PLAN OF CARE
S/P lap appy  approx 1 week ago. Now with abscess that required HAIR drain placement earlier today in IR.   No falls or injuries this shift. Needs some assist to get OOB. Steady on feet once in upright position. Med with po oxy 2X this shift. IVF: LR@100. IV Zosyn Q6H. Sepsis flag earlier in shift. Lactic: 1.0. Temp max: 101.9 this shift. Drain placed on previous shift to RLQ abd-K+ PO replacement at 1700. Recheck at 2100: 3.5. Will recheck in am.     Face to face report given with opportunity to observe patient.    Report given to Tommy Krueger RN   11/30/2020  11:16 PM

## 2020-12-01 NOTE — PLAN OF CARE
"Reason for hospital stay:  Patient here for postprocedural intraabdominal abscess after an appendectomy.   Living situation PTA: Pt from home  Most recent vitals: /72 (BP Location: Right arm)   Pulse 96   Temp 99.6  F (37.6  C) (Tympanic)   Resp 20   Ht 1.6 m (5' 3\")   Wt 108 kg (238 lb 1.6 oz)   SpO2 92%   BMI 42.18 kg/m      Pain Management:  Patient rates her pain a 5/10 in her lower right abdomen, receiving oxycodone for pain every three hours.   LOC:  Patient alert and oriented.  Cardiac:  WDL  Respiratory:  WDL  GI:  Pts bowel sounds active in all quadrants, HAIR drain was stripped and has serosanguineous drainage  :  WDL  Skin Issues:  Pt has scattered bruising, dressing for HAIR drain was changed.    IVF:   ml/hr  ABX:  Zosyn every 6 hours    Nutrition: Pt advanced to low fiber diet, no nausea noted.   ADL's:  Pt showered and ambulates to the bathroom independently.  Ambulation:Independently ambulates within her room and the halls. Patient walked 300 feet in the hallway.  Safety:  No alarms, non slip socks, clutter free environment    Comments:  Pt running a low grade temp of 99.6, will continue to monitor.    12/1/2020  12:04 PM  Pamela Camacho RN    "

## 2020-12-01 NOTE — PHARMACY-MEDICATION REGIMEN REVIEW
Pharmacy Antimicrobial Stewardship Assessment     Current Antimicrobial Therapy:  Anti-infectives (From now, onward)    Start     Dose/Rate Route Frequency Ordered Stop    11/29/20 1600  piperacillin-tazobactam (ZOSYN) intermittent infusion 4.5 g      4.5 g  over 30 Minutes Intravenous EVERY 6 HOURS 11/29/20 1416            Indication: Abscess/Sepsis    Days of Therapy: 3     Pertinent Labs:  Creatinine   Date Value Ref Range Status   12/01/2020 0.67 0.52 - 1.04 mg/dL Final   11/30/2020 0.64 0.52 - 1.04 mg/dL Final   11/29/2020 0.66 0.52 - 1.04 mg/dL Final     WBC   Date Value Ref Range Status   12/01/2020 13.5 (H) 4.0 - 11.0 10e9/L Final   11/30/2020 12.1 (H) 4.0 - 11.0 10e9/L Final   11/29/2020 13.9 (H) 4.0 - 11.0 10e9/L Final     Procalcitonin   Date Value Ref Range Status   11/29/2020 0.28 ng/ml Final     Comment:     0.25-0.49 ng/ml  Possible early systemic infection or localized infection.     Recommendation: Encourage antibiotics only in the correct clinical context.   Consider obtaining blood cultures or other relevant cultures. Recheck PCT in   6-12 hours to ensure baseline low level. If repeat PCT is rising, consider   early systemic infection and consider starting antibiotics.       CRP Inflammation   Date Value Ref Range Status   11/18/2019 4.2 0.0 - 8.0 mg/L Final       Culture Results:   (11/29/20) Blood  (11/29/20) COVID = negative  (11/30/20) Abscess  (11/30/20) Appendix Culture = GNR + GPC     (11/22/20) Blood = negative     Recommendations/Interventions:  1. None    Keith Camargo, [facility]  December 1, 2020

## 2020-12-01 NOTE — PROGRESS NOTES
Face to face report given with opportunity to observe patient.    Report given to willie LOPEZ /tapan link  12/1/2020  11:31 AM

## 2020-12-02 LAB
ANION GAP SERPL CALCULATED.3IONS-SCNC: 5 MMOL/L (ref 3–14)
BUN SERPL-MCNC: 9 MG/DL (ref 7–30)
CALCIUM SERPL-MCNC: 8.8 MG/DL (ref 8.5–10.1)
CHLORIDE SERPL-SCNC: 101 MMOL/L (ref 94–109)
CO2 SERPL-SCNC: 32 MMOL/L (ref 20–32)
CREAT SERPL-MCNC: 0.73 MG/DL (ref 0.52–1.04)
ERYTHROCYTE [DISTWIDTH] IN BLOOD BY AUTOMATED COUNT: 13 % (ref 10–15)
GFR SERPL CREATININE-BSD FRML MDRD: >90 ML/MIN/{1.73_M2}
GLUCOSE SERPL-MCNC: 100 MG/DL (ref 70–99)
HCT VFR BLD AUTO: 34.2 % (ref 35–47)
HGB BLD-MCNC: 11.3 G/DL (ref 11.7–15.7)
MCH RBC QN AUTO: 31.7 PG (ref 26.5–33)
MCHC RBC AUTO-ENTMCNC: 33 G/DL (ref 31.5–36.5)
MCV RBC AUTO: 96 FL (ref 78–100)
PLATELET # BLD AUTO: 263 10E9/L (ref 150–450)
POTASSIUM SERPL-SCNC: 3.6 MMOL/L (ref 3.4–5.3)
RBC # BLD AUTO: 3.56 10E12/L (ref 3.8–5.2)
SODIUM SERPL-SCNC: 138 MMOL/L (ref 133–144)
WBC # BLD AUTO: 11.9 10E9/L (ref 4–11)

## 2020-12-02 PROCEDURE — 250N000011 HC RX IP 250 OP 636: Performed by: STUDENT IN AN ORGANIZED HEALTH CARE EDUCATION/TRAINING PROGRAM

## 2020-12-02 PROCEDURE — 99231 SBSQ HOSP IP/OBS SF/LOW 25: CPT | Mod: 24 | Performed by: SURGERY

## 2020-12-02 PROCEDURE — 120N000001 HC R&B MED SURG/OB

## 2020-12-02 PROCEDURE — 36415 COLL VENOUS BLD VENIPUNCTURE: CPT | Performed by: SURGERY

## 2020-12-02 PROCEDURE — 80048 BASIC METABOLIC PNL TOTAL CA: CPT | Performed by: SURGERY

## 2020-12-02 PROCEDURE — 84132 ASSAY OF SERUM POTASSIUM: CPT | Performed by: SURGERY

## 2020-12-02 PROCEDURE — 85027 COMPLETE CBC AUTOMATED: CPT | Performed by: SURGERY

## 2020-12-02 PROCEDURE — 250N000013 HC RX MED GY IP 250 OP 250 PS 637: Performed by: STUDENT IN AN ORGANIZED HEALTH CARE EDUCATION/TRAINING PROGRAM

## 2020-12-02 PROCEDURE — 250N000011 HC RX IP 250 OP 636: Performed by: SURGERY

## 2020-12-02 RX ORDER — SODIUM CHLORIDE, SODIUM LACTATE, POTASSIUM CHLORIDE, CALCIUM CHLORIDE 600; 310; 30; 20 MG/100ML; MG/100ML; MG/100ML; MG/100ML
1000 INJECTION, SOLUTION INTRAVENOUS CONTINUOUS
Status: DISCONTINUED | OUTPATIENT
Start: 2020-12-02 | End: 2020-12-03

## 2020-12-02 RX ADMIN — ACETAMINOPHEN 650 MG: 325 TABLET, FILM COATED ORAL at 19:48

## 2020-12-02 RX ADMIN — OXYCODONE HYDROCHLORIDE 10 MG: 5 TABLET ORAL at 21:25

## 2020-12-02 RX ADMIN — ACETAMINOPHEN 650 MG: 325 TABLET, FILM COATED ORAL at 01:23

## 2020-12-02 RX ADMIN — OXYCODONE HYDROCHLORIDE 10 MG: 5 TABLET ORAL at 17:34

## 2020-12-02 RX ADMIN — OXYCODONE HYDROCHLORIDE 10 MG: 5 TABLET ORAL at 14:29

## 2020-12-02 RX ADMIN — TAZOBACTAM SODIUM AND PIPERACILLIN SODIUM 4.5 G: 500; 4 INJECTION, SOLUTION INTRAVENOUS at 04:20

## 2020-12-02 RX ADMIN — TAZOBACTAM SODIUM AND PIPERACILLIN SODIUM 4.5 G: 500; 4 INJECTION, SOLUTION INTRAVENOUS at 16:08

## 2020-12-02 RX ADMIN — LISINOPRIL: 10 TABLET ORAL at 09:45

## 2020-12-02 RX ADMIN — ACETAMINOPHEN 650 MG: 325 TABLET, FILM COATED ORAL at 06:11

## 2020-12-02 RX ADMIN — SIMVASTATIN 20 MG: 20 TABLET, FILM COATED ORAL at 21:25

## 2020-12-02 RX ADMIN — ENOXAPARIN SODIUM 40 MG: 40 INJECTION SUBCUTANEOUS at 09:46

## 2020-12-02 RX ADMIN — OXYCODONE HYDROCHLORIDE 10 MG: 5 TABLET ORAL at 10:01

## 2020-12-02 RX ADMIN — TAZOBACTAM SODIUM AND PIPERACILLIN SODIUM 4.5 G: 500; 4 INJECTION, SOLUTION INTRAVENOUS at 09:46

## 2020-12-02 RX ADMIN — OXYCODONE HYDROCHLORIDE 10 MG: 5 TABLET ORAL at 01:23

## 2020-12-02 RX ADMIN — TAZOBACTAM SODIUM AND PIPERACILLIN SODIUM 4.5 G: 500; 4 INJECTION, SOLUTION INTRAVENOUS at 21:48

## 2020-12-02 RX ADMIN — OXYCODONE HYDROCHLORIDE 10 MG: 5 TABLET ORAL at 06:11

## 2020-12-02 ASSESSMENT — ACTIVITIES OF DAILY LIVING (ADL)
ADLS_ACUITY_SCORE: 14

## 2020-12-02 NOTE — PROGRESS NOTES
[facility] Progress Note    S: febrile x 1 yesterday, still with right sided pain, eating now without issue     # Pain Assessment:  Current Pain Score 2020   Patient currently in pain? yes   Pain score (0-10) -   multimodal pain medication available.     O:   Vitals:  BP  Min: 132/66  Max: 166/86  Temp  Av.2  F (37.9  C)  Min: 99.5  F (37.5  C)  Max: 101.9  F (38.8  C)  Pulse  Av.5  Min: 84  Max: 94  I/O last 3 completed shifts:  In: 3098 [P.O.:957; I.V.:2141]  Out: 3120 [Urine:3100; Drains:20]    Peripheral IV 20 Right Lower forearm (Active)   Site Assessment [facility] 20 2322   Line Status Infusing 20 2322   Dressing Intervention New dressing  20 0520   Phlebitis Scale 0-->no symptoms 202   Infiltration Scale 0 20 2322   Number of days: 1       Closed/Suction Drain Right;Anterior Perineal Bulb (Active)   Site Description WDL 20 0615   Dressing Status Normal: Clean, Dry & Intact 20 0615   Drainage Appearance Serosanguenous 20 0615   Status Irrigated 20 0745   Output (ml) 10 ml 20 0615   Number of days: 2       Incision/Surgical Site 20 Abdomen (Active)   Incision Assessment WDL 20 2322   Closure [place] 20 0903   Incision Drainage Amount Scant 20 1300   Drainage Description Serosanguinous 20 1300   Dressing Intervention New dressing applied 20 1300   Number of days: 10     No line/device    Physical Exam:  General: alert oriented, no acute distress   ENT: sclera non-icteric   Pulmonary: no respiratory distress   CVS: RRR  ABD: obese, tender on right side, there is purulent fluid in drain tubing.   Extremities: WWP     Assessment/Plan:  55 y.o. female with post-op abscess following laparoscopic appendectomy. IR drain placed with good amount aspirated on . Pigtail left behind.   Awaiting susceptibilities will transition to oral antibiotics. If continues to have fevers may need to re-scan. Did flush  drain and flushed easily.   WBC trending down.     Albert Fernandez MD

## 2020-12-02 NOTE — PROGRESS NOTES
Face to face report given with opportunity to observe patient.    Report given to Jo LOPEZ and Katelyn cheema/vega cheema  12/2/2020  11:14 AM

## 2020-12-02 NOTE — PLAN OF CARE
No falls or injuries this shift. Up in room-steady on feet. Med X2 this shift with natanael-adequate relief of pain. HAIR with scant drng this shift. IVF rate to 50/hr. Continues on Zosyn Q6H       Face to face report given with opportunity to observe patient.    Report given to Tommy Krueger RN   12/1/2020  11:11 PM

## 2020-12-02 NOTE — PLAN OF CARE
" /83 (BP Location: Left arm)   Pulse 84   Temp 98.7  F (37.1  C) (Tympanic)   Resp 18   Ht 1.6 m (5' 3\")   Wt 108 kg (238 lb 1.6 oz)   SpO2 93%   BMI 42.18 kg/m      LR running at 50 mL/hr. IV Zosyn. Highest temp of 99.4. Afebrile this AM at 98.7. Pain a 3-5/10 managed w/ PO roxicodone x2 & PO tylenol x2. RLQ remains tender w/ dressing to abd CDI. Total output from HAIR of 10 mLs serosanguinous fluid. Advanced to a low fiber diet. No loose stool this shift. AM K+ of 3.6. Bed in lowest position. Call light in reach. ID band in place. Makes needs known.     Face to face report given with opportunity to observe patient.    Report given to Leon Garcia RN   12/2/2020  7:31 AM      "

## 2020-12-02 NOTE — PLAN OF CARE
Face to face report given with opportunity to observe patient.    Report given to Margareth Chávez   12/1/2020  7:37 PM

## 2020-12-02 NOTE — PLAN OF CARE
"Reason for hospital stay:  Post procedural intraabdominal abcess    Most recent vitals: /74 (BP Location: Left leg)   Pulse 92   Temp 99.3  F (37.4  C) (Tympanic)   Resp 18   Ht 1.6 m (5' 3\")   Wt 108 kg (238 lb 1.6 oz)   SpO2 97%   BMI 42.18 kg/m       Nick drain, flushed by Dr. Fernandez in am with 6cc of normal saline. Dressing changed, scant dried blood on dressing at insertion site. Small amount vaginal bleeding with void, looked like post menopausal bleed, Jim Jade made aware via voicemail. On lovenox. Patient educated to monitor for additional bleeding. IV patent and infusing.     Face to face report given with opportunity to observe patient.    Report given to JOHN Zuluaga RN   12/2/2020  4:16 PM    "

## 2020-12-03 ENCOUNTER — APPOINTMENT (OUTPATIENT)
Dept: CT IMAGING | Facility: HOSPITAL | Age: 55
DRG: 872 | End: 2020-12-03
Attending: SURGERY
Payer: COMMERCIAL

## 2020-12-03 LAB
ANION GAP SERPL CALCULATED.3IONS-SCNC: 7 MMOL/L (ref 3–14)
BUN SERPL-MCNC: 10 MG/DL (ref 7–30)
CALCIUM SERPL-MCNC: 8.7 MG/DL (ref 8.5–10.1)
CHLORIDE SERPL-SCNC: 99 MMOL/L (ref 94–109)
CO2 SERPL-SCNC: 31 MMOL/L (ref 20–32)
CREAT SERPL-MCNC: 0.75 MG/DL (ref 0.52–1.04)
ERYTHROCYTE [DISTWIDTH] IN BLOOD BY AUTOMATED COUNT: 12.9 % (ref 10–15)
GFR SERPL CREATININE-BSD FRML MDRD: 89 ML/MIN/{1.73_M2}
GLUCOSE SERPL-MCNC: 102 MG/DL (ref 70–99)
HCT VFR BLD AUTO: 34.2 % (ref 35–47)
HGB BLD-MCNC: 11.1 G/DL (ref 11.7–15.7)
MCH RBC QN AUTO: 31.1 PG (ref 26.5–33)
MCHC RBC AUTO-ENTMCNC: 32.5 G/DL (ref 31.5–36.5)
MCV RBC AUTO: 96 FL (ref 78–100)
PLATELET # BLD AUTO: 416 10E9/L (ref 150–450)
POTASSIUM SERPL-SCNC: 3.5 MMOL/L (ref 3.4–5.3)
RBC # BLD AUTO: 3.57 10E12/L (ref 3.8–5.2)
SODIUM SERPL-SCNC: 137 MMOL/L (ref 133–144)
WBC # BLD AUTO: 13 10E9/L (ref 4–11)

## 2020-12-03 PROCEDURE — 250N000013 HC RX MED GY IP 250 OP 250 PS 637: Performed by: STUDENT IN AN ORGANIZED HEALTH CARE EDUCATION/TRAINING PROGRAM

## 2020-12-03 PROCEDURE — 250N000011 HC RX IP 250 OP 636: Performed by: STUDENT IN AN ORGANIZED HEALTH CARE EDUCATION/TRAINING PROGRAM

## 2020-12-03 PROCEDURE — 255N000002 HC RX 255 OP 636: Performed by: RADIOLOGY

## 2020-12-03 PROCEDURE — 85027 COMPLETE CBC AUTOMATED: CPT | Performed by: SURGERY

## 2020-12-03 PROCEDURE — 74177 CT ABD & PELVIS W/CONTRAST: CPT

## 2020-12-03 PROCEDURE — 99231 SBSQ HOSP IP/OBS SF/LOW 25: CPT | Mod: 24 | Performed by: SURGERY

## 2020-12-03 PROCEDURE — 250N000013 HC RX MED GY IP 250 OP 250 PS 637: Performed by: SURGERY

## 2020-12-03 PROCEDURE — 250N000011 HC RX IP 250 OP 636: Performed by: SURGERY

## 2020-12-03 PROCEDURE — 36415 COLL VENOUS BLD VENIPUNCTURE: CPT | Performed by: SURGERY

## 2020-12-03 PROCEDURE — 80048 BASIC METABOLIC PNL TOTAL CA: CPT | Performed by: SURGERY

## 2020-12-03 PROCEDURE — 120N000001 HC R&B MED SURG/OB

## 2020-12-03 RX ORDER — IOPAMIDOL 612 MG/ML
100 INJECTION, SOLUTION INTRAVASCULAR ONCE
Status: COMPLETED | OUTPATIENT
Start: 2020-12-03 | End: 2020-12-03

## 2020-12-03 RX ORDER — LACTOBACILLUS RHAMNOSUS GG 10B CELL
1 CAPSULE ORAL 2 TIMES DAILY
Status: DISCONTINUED | OUTPATIENT
Start: 2020-12-03 | End: 2020-12-06 | Stop reason: HOSPADM

## 2020-12-03 RX ADMIN — OXYCODONE HYDROCHLORIDE 10 MG: 5 TABLET ORAL at 20:16

## 2020-12-03 RX ADMIN — OXYCODONE HYDROCHLORIDE 10 MG: 5 TABLET ORAL at 11:41

## 2020-12-03 RX ADMIN — IOPAMIDOL 100 ML: 612 INJECTION, SOLUTION INTRAVENOUS at 09:49

## 2020-12-03 RX ADMIN — OXYCODONE HYDROCHLORIDE 10 MG: 5 TABLET ORAL at 08:00

## 2020-12-03 RX ADMIN — Medication 1 CAPSULE: at 20:11

## 2020-12-03 RX ADMIN — LISINOPRIL: 10 TABLET ORAL at 09:20

## 2020-12-03 RX ADMIN — OXYCODONE HYDROCHLORIDE 10 MG: 5 TABLET ORAL at 00:07

## 2020-12-03 RX ADMIN — SIMVASTATIN 20 MG: 20 TABLET, FILM COATED ORAL at 20:11

## 2020-12-03 RX ADMIN — OXYCODONE HYDROCHLORIDE 10 MG: 5 TABLET ORAL at 23:36

## 2020-12-03 RX ADMIN — OXYCODONE HYDROCHLORIDE 10 MG: 5 TABLET ORAL at 16:23

## 2020-12-03 RX ADMIN — ENOXAPARIN SODIUM 40 MG: 40 INJECTION SUBCUTANEOUS at 09:20

## 2020-12-03 RX ADMIN — TAZOBACTAM SODIUM AND PIPERACILLIN SODIUM 4.5 G: 500; 4 INJECTION, SOLUTION INTRAVENOUS at 04:05

## 2020-12-03 RX ADMIN — AMOXICILLIN AND CLAVULANATE POTASSIUM 1 TABLET: 875; 125 TABLET, FILM COATED ORAL at 20:12

## 2020-12-03 RX ADMIN — TAZOBACTAM SODIUM AND PIPERACILLIN SODIUM 4.5 G: 500; 4 INJECTION, SOLUTION INTRAVENOUS at 10:04

## 2020-12-03 RX ADMIN — OXYCODONE HYDROCHLORIDE 10 MG: 5 TABLET ORAL at 04:04

## 2020-12-03 ASSESSMENT — ACTIVITIES OF DAILY LIVING (ADL)
ADLS_ACUITY_SCORE: 14

## 2020-12-03 NOTE — PLAN OF CARE
Face to face report given with opportunity to observe patient.    Report given to Carlyn Bueno RN   12/3/2020  7:11 AM

## 2020-12-03 NOTE — PLAN OF CARE
No falls or injuries this shift. IVF @ 25/hr. IV Zosyn Q6H Temp max 100.5 this shift. Vdg without diff. Ambulated in v[place]a this shift with assist. Steady on feet.     Face to face report given with opportunity to observe patient.    Report given to Shannon Krueger RN   12/2/2020  11:33 PM

## 2020-12-03 NOTE — PROGRESS NOTES
[facility] Progress Note    S: Right sided abdominal pain, no midline pain. Low grade temp early this am. Eating without issue. Minimal drain output.     # Pain Assessment:  Current Pain Score 12/3/2020   Patient currently in pain? yes   Pain score (0-10) -   multimodal pain medication available.     O:   Vitals:  BP  Min: 132/66  Max: 166/86  Temp  Av.2  F (37.9  C)  Min: 99.5  F (37.5  C)  Max: 101.9  F (38.8  C)  Pulse  Av.5  Min: 84  Max: 94  I/O last 3 completed shifts:  In: 1444 [P.O.:1200; I.V.:244]  Out: 2265 [Urine:2250; Drains:15]    Peripheral IV 20 Right Lower forearm (Active)   Site Assessment WDL;Ecchymotic 20 08   Line Status Infusing;Checked every 1-2 hour 20 08   Dressing Intervention New dressing  20 0520   Phlebitis Scale 0-->no symptoms 20   Infiltration Scale 0 20   Infiltration Site Treatment Method  None 20 08   Number of days: 2       Closed/Suction Drain Right;Anterior Perineal Bulb (Active)   Site Description WDL X;Reddened 20 114   Dressing Status Normal: Clean, Dry & Intact 20 114   Drainage Appearance Serous;Serosanguenous 20 114   Status To bulb suction 20 1147   Output (ml) 15 ml 20 2130   Number of days: 3       Incision/Surgical Site 20 Abdomen (Active)   Incision Assessment WDL 20   Leila-Incision Assessment Warm 20 08   Closure Liquid bandage 20 08   Incision Drainage Amount None 20   Drainage Description Sanguinous 20 1020   Incision Care Soap and water 20 08   Dressing Intervention Clean, dry, intact;Open to air / No Dressing 20 08   Number of days: 11     No line/device    Physical Exam:  General: alert oriented, no acute distress   ENT: sclera non-icteric   Pulmonary: no respiratory distress   CVS: RRR  ABD: obese, tender on right side, serous fluid in drain.   Extremities: WWP     Assessment/Plan:  55 y.o.  female with post-op abscess following laparoscopic appendectomy. IR drain placed with good amount aspirated on 11/30. Pigtail left behind.   WBC count back up today so I did repeat CT scan, it appears the drain is well positioned and controlling abscess, I was concerned somewhat about a potential infection at her suprapubic port site though really benign clinically, her exam is very limited due to her body habitus though. Will trial some oral antibiotics with Augmentin that after discussion with pharmacy should cover both strains of e. Coli and the strep. She is non-toxic, I fear that going back to the OR to soon will increase her morbidity significantly.      Albert Fernandez MD

## 2020-12-03 NOTE — PLAN OF CARE
"/77 (BP Location: Left arm)   Pulse 92   Temp 99  F (37.2  C) (Tympanic)   Resp 16   Ht 1.6 m (5' 3\")   Wt 108 kg (238 lb 1.6 oz)   SpO2 95%   BMI 42.18 kg/m   Low grade T ranging from 99-99.8. Pt consistently c/o sharp right, lower abd pain rated 4/10. PRN oxycodone (10 mg) administered at 0800 and 1141 and 1623. Relief noted with each administration.    Lungs: clear. : Voiding without difficulty. This afternoon, pt had some blood with urine. Pt state she went through menopause though has had some irregular bleeding since. PCP aware. Surgeon aware (this happened on 12/2/2020). Will continue to monitor.GI: Active bowel sounds. Pt reports passing gas. Pt reports no issues with voiding or stooling. Denies loose stools. Abd: soft to palpation. Tender to palpation on R side. HAIR drain in place. Scant serous/serosanguinous output noted. Dressing to HAIR site changed. Slight erythema noted around HIAR insertion site. Site slightly hardened. No c/o pain, no drainage around site, and not tender to palpation. Will continue to monitor. Strong pedal pulses. No edema. Pt up ad dino. Independent, appropriate, makes needs known.    PIV infusing LR TKO. ABX: IV zosyn. To transition to PO ABX this evening. Call light within reach. Will continue to monitor.  "

## 2020-12-03 NOTE — PLAN OF CARE
Face to face report given with opportunity to observe patient.    Report given to SN Graff RN   12/2/2020  7:58 PM

## 2020-12-04 LAB
ERYTHROCYTE [DISTWIDTH] IN BLOOD BY AUTOMATED COUNT: 12.8 % (ref 10–15)
HCT VFR BLD AUTO: 34.8 % (ref 35–47)
HGB BLD-MCNC: 11.5 G/DL (ref 11.7–15.7)
MCH RBC QN AUTO: 31.8 PG (ref 26.5–33)
MCHC RBC AUTO-ENTMCNC: 33 G/DL (ref 31.5–36.5)
MCV RBC AUTO: 96 FL (ref 78–100)
PLATELET # BLD AUTO: 454 10E9/L (ref 150–450)
RBC # BLD AUTO: 3.62 10E12/L (ref 3.8–5.2)
WBC # BLD AUTO: 12.6 10E9/L (ref 4–11)

## 2020-12-04 PROCEDURE — 36415 COLL VENOUS BLD VENIPUNCTURE: CPT | Performed by: SURGERY

## 2020-12-04 PROCEDURE — 85027 COMPLETE CBC AUTOMATED: CPT | Performed by: SURGERY

## 2020-12-04 PROCEDURE — 250N000011 HC RX IP 250 OP 636: Performed by: SURGERY

## 2020-12-04 PROCEDURE — 250N000011 HC RX IP 250 OP 636: Performed by: STUDENT IN AN ORGANIZED HEALTH CARE EDUCATION/TRAINING PROGRAM

## 2020-12-04 PROCEDURE — 120N000001 HC R&B MED SURG/OB

## 2020-12-04 PROCEDURE — 99231 SBSQ HOSP IP/OBS SF/LOW 25: CPT | Mod: 24 | Performed by: SURGERY

## 2020-12-04 PROCEDURE — 250N000013 HC RX MED GY IP 250 OP 250 PS 637: Performed by: STUDENT IN AN ORGANIZED HEALTH CARE EDUCATION/TRAINING PROGRAM

## 2020-12-04 PROCEDURE — 250N000013 HC RX MED GY IP 250 OP 250 PS 637: Performed by: SURGERY

## 2020-12-04 RX ADMIN — AMOXICILLIN AND CLAVULANATE POTASSIUM 1 TABLET: 875; 125 TABLET, FILM COATED ORAL at 21:11

## 2020-12-04 RX ADMIN — OXYCODONE HYDROCHLORIDE 10 MG: 5 TABLET ORAL at 13:37

## 2020-12-04 RX ADMIN — Medication 1 CAPSULE: at 08:50

## 2020-12-04 RX ADMIN — SIMVASTATIN 20 MG: 20 TABLET, FILM COATED ORAL at 21:11

## 2020-12-04 RX ADMIN — Medication 1 CAPSULE: at 21:11

## 2020-12-04 RX ADMIN — LISINOPRIL: 10 TABLET ORAL at 08:52

## 2020-12-04 RX ADMIN — AMOXICILLIN AND CLAVULANATE POTASSIUM 1 TABLET: 875; 125 TABLET, FILM COATED ORAL at 08:49

## 2020-12-04 RX ADMIN — OXYCODONE HYDROCHLORIDE 5 MG: 5 TABLET ORAL at 18:55

## 2020-12-04 RX ADMIN — HYDROMORPHONE HYDROCHLORIDE 0.5 MG: 1 INJECTION, SOLUTION INTRAMUSCULAR; INTRAVENOUS; SUBCUTANEOUS at 08:31

## 2020-12-04 RX ADMIN — ENOXAPARIN SODIUM 40 MG: 40 INJECTION SUBCUTANEOUS at 08:49

## 2020-12-04 RX ADMIN — OXYCODONE HYDROCHLORIDE 10 MG: 5 TABLET ORAL at 05:57

## 2020-12-04 ASSESSMENT — ACTIVITIES OF DAILY LIVING (ADL)
ADLS_ACUITY_SCORE: 14

## 2020-12-04 NOTE — PROGRESS NOTES
[facility] Progress Note    S: Fever curve trending down, just pain on right side no real change. Eating, stooling and voiding without issue.     # Pain Assessment:  Current Pain Score 2020   Patient currently in pain? sleeping, patient not able to self report   Pain score (0-10) -   multimodal pain medication available.     O:   Vitals:  BP  Min: 132/66  Max: 166/86  Temp  Av.2  F (37.9  C)  Min: 99.5  F (37.5  C)  Max: 101.9  F (38.8  C)  Pulse  Av.5  Min: 84  Max: 94  I/O last 3 completed shifts:  In: 1772 [P.O.:960; I.V.:812]  Out: 1800 [Urine:1800]    Peripheral IV 20 Right Lower forearm (Active)   Site Assessment WDL except 20 0839   Line Status Infusing 20 0839   Dressing Intervention New dressing  20 0520   Phlebitis Scale 1-->erythema at access site with or without pain 20   Infiltration Scale 0 20 0839   Infiltration Site Treatment Method  None 20 0800   Number of days: 3       Closed/Suction Drain Right;Anterior Perineal Bulb (Active)   Site Description UTV 20   Dressing Status Normal: Clean, Dry & Intact 20   Drainage Appearance Serous;Serosanguenous 20 1615   Status To bulb suction 20   Output (ml) 15 ml 200   Number of days: 4       Incision/Surgical Site 20 Abdomen (Active)   Incision Assessment WDL 20   Leila-Incision Assessment Warm 20   Closure Liquid bandage 20   Incision Drainage Amount None 20 233   Drainage Description Other (Comment) 20 1615   Incision Care Soap and water 20 0800   Dressing Intervention Open to air / No Dressing 20 233   Number of days: 12     No line/device    Physical Exam:  General: alert oriented, no acute distress   ENT: sclera non-icteric   Pulmonary: no respiratory distress   CVS: RRR  ABD: obese, tender on right side, dark serous fluid in drain. Increased erythema of lower panus, with  tenderness and palpable fluid collection under lower incision.    Extremities: WWP     Assessment/Plan:  55 y.o. female with post-op abscess following laparoscopic appendectomy. IR drain placed with good amount aspirated on 11/30. Pigtail left behind. Repeat CT 12/3 scan shows improved fluid collection in RLQ. There was increase gas over the suprapubic port site. Switched to oral antibiotics last evening 12/3.     Fever curve continues to trend down. WBC stable. She was more tender over the suprapubic site this am I did open up lower incision with murky fluid collection in fluid 1/4inch packing placed. Unclear if this likely wound infection is confounding her overall clinical picture.  I would like to observe over the next 24 hours, if stable she could likely be discharged, if worsening will likely need operative washout and inspection of operative site come early next week. She is not toxic, pain is controlled.     Albert Fernandez MD

## 2020-12-04 NOTE — PLAN OF CARE
A&O. VS as charted. T-Max 99.7. C/o RLQ abd pain, administered Starr 10 mg with decrease or sleeping on recheck. LS clear. BS active, denies nausea. HAIR to right abd with small output, site dressing is CDI. IV Zosyn switched to po Augmentin. Voiding w/out difficulty and some slight blood noted, Pt reports she's been having that. Low fiber diet. IV tko per Pt preference and report of a difficult IV start. IND in room, steady on feet.    Face to face report given with opportunity to observe patient.    Report given to Kieran, RN's    Kelly Bhandari RN   12/4/2020  7:43 AM

## 2020-12-04 NOTE — PHARMACY-MEDICATION REGIMEN REVIEW
[facility]    Pharmacy      Antimicrobial Stewardship Note     Current antimicrobial therapy:  Anti-infectives (From now, onward)    Start     Dose/Rate Route Frequency Ordered Stop    12/03/20 2000  amoxicillin-clavulanate (AUGMENTIN) 875-125 MG per tablet 1 tablet      1 tablet Oral EVERY 12 HOURS SCHEDULED 12/03/20 1400            Indication: post-op abscess from lap appy    Days of Therapy: 2     Pertinent labs:  Creatinine   Creatinine   Date Value Ref Range Status   12/03/2020 0.75 0.52 - 1.04 mg/dL Final   12/02/2020 0.73 0.52 - 1.04 mg/dL Final   12/01/2020 0.67 0.52 - 1.04 mg/dL Final     WBC   WBC   Date Value Ref Range Status   12/04/2020 12.6 (H) 4.0 - 11.0 10e9/L Final   12/03/2020 13.0 (H) 4.0 - 11.0 10e9/L Final   12/02/2020 11.9 (H) 4.0 - 11.0 10e9/L Final     Procalcitonin   Procalcitonin   Date Value Ref Range Status   11/29/2020 0.28 ng/ml Final     Comment:     0.25-0.49 ng/ml  Possible early systemic infection or localized infection.     Recommendation: Encourage antibiotics only in the correct clinical context.   Consider obtaining blood cultures or other relevant cultures. Recheck PCT in   6-12 hours to ensure baseline low level. If repeat PCT is rising, consider   early systemic infection and consider starting antibiotics.       CRP   CRP Inflammation   Date Value Ref Range Status   11/18/2019 4.2 0.0 - 8.0 mg/L Final       Culture Results:      No anaerobes, no growth in blood  Recommendations/Interventions:  1. None      Crista Garcia [facility]  December 4, 2020

## 2020-12-04 NOTE — PLAN OF CARE
Patient c/o RLQ abdominal pain 5/10, relieved with 10 mg PO Roxicodone and patient able to sleep. Alert and oriented, uses call light appropriately, makes needs known. Independent in room.    HAIR drain output measured 2.5 ml. Dressing LLQ shows shadowing, marked.     Lung sounds clear, heart rate regular, bowel sounds active. Denies chest pain, nausea, numbness, tingling.     IV right forearm LR @ 25 ml/hr.    Vital signs:  Temp: 98.7  F (37.1  C) Temp src: Tympanic BP: 129/59 Pulse: 92   Resp: 16 SpO2: 95 % O2 Device: None (Room air)     Face to face report given with opportunity to observe patient.    Report given to JOHN Zuluaga RN   12/4/2020  3:21 PM

## 2020-12-04 NOTE — PROGRESS NOTES
"CLIN[facility]ITION SERVICES  -  ASSESSMENT NOTE    Rosalie Stewart : LOS    55 yof admitted for postprocedural intraabdominal abscess. Pt has a hx of arthritis, anxiety, benign essential hypertension. Recent laparoscopic appendectomy on 11/22/20, pt had increasing abdominal pain. Intake has been good this admission. No significant weight change noted.    Diet Order: low fiber  Intake: 10 meals documented at % (mostly %)    Height: 5' 3\"  Weight: 238 lbs 1.55 oz  Body mass index is 42.18 kg/m .  Weight Status:  Obesity Grade III BMI >40  IBW: 52.4 kg (115 lb 8.3 oz)  Weight History:  Wt Readings from Last 10 Encounters:   11/29/20 108 kg (238 lb 1.6 oz)   11/18/19 108.9 kg (240 lb)   08/05/19 105 kg (231 lb 6.4 oz)   06/14/18 94.8 kg (209 lb)   05/24/18 94.8 kg (209 lb)   01/17/18 94.8 kg (209 lb)   12/04/17 97.5 kg (215 lb)     Malnutrition Diagnosis: Patient does not meet two of the criteria necessary for diagnosing malnutrition    NUTRITION RECOMMENDATIONS  None at this time    MONITORING AND EVALUATION:  RD will monitor intake, weight, labs        "

## 2020-12-04 NOTE — PLAN OF CARE
Face to face report given with opportunity to observe patient.    Report given to Kelly ANDERS RN.    Carlyn Álvarez RN   12/3/2020  7:55 PM

## 2020-12-05 LAB
BACTERIA SPEC CULT: NORMAL
BACTERIA SPEC CULT: NORMAL
CREAT SERPL-MCNC: 0.74 MG/DL (ref 0.52–1.04)
ERYTHROCYTE [DISTWIDTH] IN BLOOD BY AUTOMATED COUNT: 13 % (ref 10–15)
GFR SERPL CREATININE-BSD FRML MDRD: >90 ML/MIN/{1.73_M2}
HCT VFR BLD AUTO: 35.6 % (ref 35–47)
HGB BLD-MCNC: 11.7 G/DL (ref 11.7–15.7)
MCH RBC QN AUTO: 31.7 PG (ref 26.5–33)
MCHC RBC AUTO-ENTMCNC: 32.9 G/DL (ref 31.5–36.5)
MCV RBC AUTO: 97 FL (ref 78–100)
PLATELET # BLD AUTO: 487 10E9/L (ref 150–450)
RBC # BLD AUTO: 3.69 10E12/L (ref 3.8–5.2)
SPECIMEN SOURCE: NORMAL
SPECIMEN SOURCE: NORMAL
WBC # BLD AUTO: 13.6 10E9/L (ref 4–11)

## 2020-12-05 PROCEDURE — 82565 ASSAY OF CREATININE: CPT | Performed by: STUDENT IN AN ORGANIZED HEALTH CARE EDUCATION/TRAINING PROGRAM

## 2020-12-05 PROCEDURE — 85049 AUTOMATED PLATELET COUNT: CPT | Performed by: STUDENT IN AN ORGANIZED HEALTH CARE EDUCATION/TRAINING PROGRAM

## 2020-12-05 PROCEDURE — 250N000013 HC RX MED GY IP 250 OP 250 PS 637: Performed by: STUDENT IN AN ORGANIZED HEALTH CARE EDUCATION/TRAINING PROGRAM

## 2020-12-05 PROCEDURE — 85027 COMPLETE CBC AUTOMATED: CPT | Performed by: SURGERY

## 2020-12-05 PROCEDURE — 250N000011 HC RX IP 250 OP 636: Performed by: SURGERY

## 2020-12-05 PROCEDURE — 120N000001 HC R&B MED SURG/OB

## 2020-12-05 PROCEDURE — 250N000013 HC RX MED GY IP 250 OP 250 PS 637: Performed by: SURGERY

## 2020-12-05 PROCEDURE — 99231 SBSQ HOSP IP/OBS SF/LOW 25: CPT | Mod: 24 | Performed by: SURGERY

## 2020-12-05 PROCEDURE — 36415 COLL VENOUS BLD VENIPUNCTURE: CPT | Performed by: STUDENT IN AN ORGANIZED HEALTH CARE EDUCATION/TRAINING PROGRAM

## 2020-12-05 RX ORDER — KETOROLAC TROMETHAMINE 30 MG/ML
30 INJECTION, SOLUTION INTRAMUSCULAR; INTRAVENOUS EVERY 6 HOURS PRN
Status: DISCONTINUED | OUTPATIENT
Start: 2020-12-05 | End: 2020-12-06 | Stop reason: HOSPADM

## 2020-12-05 RX ADMIN — OXYCODONE HYDROCHLORIDE 10 MG: 5 TABLET ORAL at 00:06

## 2020-12-05 RX ADMIN — KETOROLAC TROMETHAMINE 30 MG: 30 INJECTION, SOLUTION INTRAMUSCULAR at 09:04

## 2020-12-05 RX ADMIN — AMOXICILLIN AND CLAVULANATE POTASSIUM 1 TABLET: 875; 125 TABLET, FILM COATED ORAL at 20:00

## 2020-12-05 RX ADMIN — Medication 1 CAPSULE: at 08:43

## 2020-12-05 RX ADMIN — ENOXAPARIN SODIUM 40 MG: 40 INJECTION SUBCUTANEOUS at 08:41

## 2020-12-05 RX ADMIN — Medication 1 CAPSULE: at 20:00

## 2020-12-05 RX ADMIN — OXYCODONE HYDROCHLORIDE 5 MG: 5 TABLET ORAL at 05:56

## 2020-12-05 RX ADMIN — AMOXICILLIN AND CLAVULANATE POTASSIUM 1 TABLET: 875; 125 TABLET, FILM COATED ORAL at 08:43

## 2020-12-05 RX ADMIN — KETOROLAC TROMETHAMINE 30 MG: 30 INJECTION, SOLUTION INTRAMUSCULAR at 17:50

## 2020-12-05 RX ADMIN — SIMVASTATIN 20 MG: 20 TABLET, FILM COATED ORAL at 20:00

## 2020-12-05 RX ADMIN — LISINOPRIL: 10 TABLET ORAL at 08:42

## 2020-12-05 ASSESSMENT — ACTIVITIES OF DAILY LIVING (ADL)
ADLS_ACUITY_SCORE: 14

## 2020-12-05 NOTE — PROGRESS NOTES
[facility] Progress Note    S: Did spike some low grade temps again last night, she is eating and moving her bowels. Still having pain deep on the right side/flank area.     # Pain Assessment:  Current Pain Score 2020   Patient currently in pain? yes   Pain score (0-10) -   Add toradol.     O:   Vitals:  BP  Min: 132/66  Max: 166/86  Temp  Av.2  F (37.9  C)  Min: 99.5  F (37.5  C)  Max: 101.9  F (38.8  C)  Pulse  Av.5  Min: 84  Max: 94  I/O last 3 completed shifts:  In: 683 [P.O.:360; I.V.:323]  Out: 1402.5 [Urine:1400; Drains:2.5]    Peripheral IV 20 Right Lower forearm (Active)   Site Assessment WDL 20 000   Line Status Infusing 20 000   Dressing Intervention New dressing  20   Phlebitis Scale 0-->no symptoms 20   Infiltration Scale 0 20 000   Infiltration Site Treatment Method  None 20 0800   Number of days: 4       Closed/Suction Drain Right;Anterior Perineal Bulb (Active)   Site Description WDL 20 06   Dressing Status Normal: Clean, Dry & Intact 20 06   Drainage Appearance Serosanguenous 20 06   Status To bulb suction 20 06   Output (ml) 0 ml 20 06   Number of days: 5       Incision/Surgical Site with Packing 20 Left;Lower (Active)   Incision Assessment WDL 20 0030   Incision Drainage Amount Scant 20 0030   Drainage Description Serosanguinous 20 003   Dressing Intervention Dressing marked - No change;Dressing reinforced 20 0030   Number of days: 0       Incision/Surgical Site 20 Abdomen (Active)   Incision Assessment WDL 20 0030   Leila-Incision Assessment Warm 20 1853   Closure Liquid bandage 20 0840   Incision Drainage Amount None 20 1853   Drainage Description Other (Comment) 20 1615   Incision Care Soap and water 20 0800   Dressing Intervention Open to air / No Dressing 20 6045   Number of days: 13     No  line/device    Physical Exam:  General: alert oriented, no acute distress   ENT: sclera non-icteric   Pulmonary: no respiratory distress   CVS: RRR  ABD: obese, tender on right side, dark serous fluid in drain. Erythema of lower pannus stable.    Extremities: WWP     Assessment/Plan:  55 y.o. female with post-op abscess following laparoscopic appendectomy. IR drain placed with good amount aspirated on 11/30. Pigtail left behind. Repeat CT 12/3 scan shows improved fluid collection in RLQ. There was increase gas over the suprapubic port site. Switched to oral antibiotics evening of 12/3. Lower incision opened and packed on 12/4.     Continues to persist with low grade temps and slight increase in her WBC count. I don't believe she would do well with discharge at this point, without clearly having control of infectious process. I will continue oral antibiotics, observe. Will likely re-scan again tomorrow with plan for possible take back to the OR on Monday for laparoscopic washout and drain placement as well as evaluate appendiceal stump closure. She is not toxic.           Albert Fernandez MD

## 2020-12-05 NOTE — PLAN OF CARE
Face to face report given with opportunity to observe patient.    Report given to JOHN Winston.     Marce Oro RN   12/5/2020  7:43 AM

## 2020-12-05 NOTE — PLAN OF CARE
No falls or injuries this shift. Continues with IVF @ TKO. No longer on IV abtx-have been changed to po. Temp max this shift: 100.5. No tylenol given this shift. Had 5mg oxycodone this shift. Area on lower abd red-warm to touch. Packing intact. Shadowing unchanged from previous shift. Scant drainage from HAIR.     Face to face report given with opportunity to observe patient.    Report given to Laura Krueger RN   12/4/2020  11:19 PM

## 2020-12-05 NOTE — PLAN OF CARE
Shift assessment complete as charted.  Dressing and idoform changed by Dr. Fernandez today.  Medicated with Toradol 30mg IV x1 today.  Tordol did help patient to achieve stated pain goal.  Was able to have hair washed today.  Up independently in room. Uses call light appropriately to make needs known.

## 2020-12-05 NOTE — PLAN OF CARE
"Most recent vitals: /84 (BP Location: Left arm)   Pulse 99   Temp 98.9  F (37.2  C) (Tympanic)   Resp 20   Ht 1.6 m (5' 3\")   Wt 108 kg (238 lb 1.6 oz)   SpO2 93%   BMI 42.18 kg/m      Pain Management:  Patient received 10 mg of oxycodone at 0006 for abdominal pain that was a 5/10 and 5 mg again at 0600 for 4/10 pain. Effective relief noted.  LOC: Alert and oriented x4.   Cardiac:  Patients heart rate and rhythm are regular. Pulses are strong in extremities.  Respiratory:  Lungs were clear and diminished bilaterally. No cough present. O2 sats are low to high 90s on room air. No shortness of breath.  GI:  Bowel sounds active in all four quadrants.  :  WDL  Skin Issues: Patient has a HAIR drain on her abdomen. The dressing is clean dry and intact. The drain has had an unmeasurable amount of serosanguineous drainage. New incision on the patient's left lower abdomen. Dressing is clean dry and intact with some redness extending around the dressing. Edges of redness were marked with a skin marker.    IVF:  TKO  ABX:  Patient is receiving oral augmentin every 12 hours.    Comments:  Patients highest temperature was 100.5, will continue to monitor.    12/5/2020  0600  Pamela Camacho RN    "

## 2020-12-06 ENCOUNTER — APPOINTMENT (OUTPATIENT)
Dept: CT IMAGING | Facility: HOSPITAL | Age: 55
DRG: 872 | End: 2020-12-06
Attending: SURGERY
Payer: COMMERCIAL

## 2020-12-06 VITALS
HEIGHT: 63 IN | TEMPERATURE: 99.4 F | RESPIRATION RATE: 18 BRPM | HEART RATE: 87 BPM | OXYGEN SATURATION: 94 % | SYSTOLIC BLOOD PRESSURE: 144 MMHG | DIASTOLIC BLOOD PRESSURE: 71 MMHG | WEIGHT: 238.1 LBS | BODY MASS INDEX: 42.19 KG/M2

## 2020-12-06 LAB
BACTERIA SPEC CULT: ABNORMAL
ERYTHROCYTE [DISTWIDTH] IN BLOOD BY AUTOMATED COUNT: 12.8 % (ref 10–15)
HCT VFR BLD AUTO: 33.2 % (ref 35–47)
HGB BLD-MCNC: 10.9 G/DL (ref 11.7–15.7)
Lab: ABNORMAL
MCH RBC QN AUTO: 31.8 PG (ref 26.5–33)
MCHC RBC AUTO-ENTMCNC: 32.8 G/DL (ref 31.5–36.5)
MCV RBC AUTO: 97 FL (ref 78–100)
PLATELET # BLD AUTO: 519 10E9/L (ref 150–450)
RBC # BLD AUTO: 3.43 10E12/L (ref 3.8–5.2)
SPECIMEN SOURCE: ABNORMAL
WBC # BLD AUTO: 11.1 10E9/L (ref 4–11)

## 2020-12-06 PROCEDURE — 250N000011 HC RX IP 250 OP 636: Performed by: SURGERY

## 2020-12-06 PROCEDURE — 250N000013 HC RX MED GY IP 250 OP 250 PS 637: Performed by: STUDENT IN AN ORGANIZED HEALTH CARE EDUCATION/TRAINING PROGRAM

## 2020-12-06 PROCEDURE — 74177 CT ABD & PELVIS W/CONTRAST: CPT

## 2020-12-06 PROCEDURE — 99238 HOSP IP/OBS DSCHRG MGMT 30/<: CPT | Mod: 24 | Performed by: SURGERY

## 2020-12-06 PROCEDURE — 85027 COMPLETE CBC AUTOMATED: CPT | Performed by: SURGERY

## 2020-12-06 PROCEDURE — 255N000002 HC RX 255 OP 636: Performed by: STUDENT IN AN ORGANIZED HEALTH CARE EDUCATION/TRAINING PROGRAM

## 2020-12-06 PROCEDURE — 36415 COLL VENOUS BLD VENIPUNCTURE: CPT | Performed by: SURGERY

## 2020-12-06 PROCEDURE — 250N000013 HC RX MED GY IP 250 OP 250 PS 637: Performed by: SURGERY

## 2020-12-06 RX ORDER — IBUPROFEN 600 MG/1
600 TABLET, FILM COATED ORAL EVERY 6 HOURS PRN
Qty: 30 TABLET | Refills: 0 | Status: ON HOLD | OUTPATIENT
Start: 2020-12-06 | End: 2021-01-05

## 2020-12-06 RX ORDER — OXYCODONE HYDROCHLORIDE 5 MG/1
5 TABLET ORAL
Qty: 20 TABLET | Refills: 0 | Status: SHIPPED | OUTPATIENT
Start: 2020-12-06 | End: 2020-12-19

## 2020-12-06 RX ORDER — ACETAMINOPHEN 325 MG/1
650 TABLET ORAL EVERY 4 HOURS PRN
Qty: 30 TABLET | Refills: 0 | Status: SHIPPED | OUTPATIENT
Start: 2020-12-06 | End: 2020-12-20

## 2020-12-06 RX ORDER — IOPAMIDOL 612 MG/ML
100 INJECTION, SOLUTION INTRAVASCULAR ONCE
Status: COMPLETED | OUTPATIENT
Start: 2020-12-06 | End: 2020-12-06

## 2020-12-06 RX ADMIN — OXYCODONE HYDROCHLORIDE 5 MG: 5 TABLET ORAL at 13:40

## 2020-12-06 RX ADMIN — Medication 1 CAPSULE: at 08:34

## 2020-12-06 RX ADMIN — OXYCODONE HYDROCHLORIDE 5 MG: 5 TABLET ORAL at 16:51

## 2020-12-06 RX ADMIN — OXYCODONE HYDROCHLORIDE 10 MG: 5 TABLET ORAL at 00:37

## 2020-12-06 RX ADMIN — ENOXAPARIN SODIUM 40 MG: 40 INJECTION SUBCUTANEOUS at 08:34

## 2020-12-06 RX ADMIN — AMOXICILLIN AND CLAVULANATE POTASSIUM 1 TABLET: 875; 125 TABLET, FILM COATED ORAL at 08:34

## 2020-12-06 RX ADMIN — KETOROLAC TROMETHAMINE 30 MG: 30 INJECTION, SOLUTION INTRAMUSCULAR at 08:34

## 2020-12-06 RX ADMIN — IOPAMIDOL 100 ML: 612 INJECTION, SOLUTION INTRAVENOUS at 13:09

## 2020-12-06 RX ADMIN — LISINOPRIL: 10 TABLET ORAL at 08:34

## 2020-12-06 ASSESSMENT — ACTIVITIES OF DAILY LIVING (ADL)
ADLS_ACUITY_SCORE: 14

## 2020-12-06 NOTE — PROGRESS NOTES
[facility] Progress Note    S: Afebrile, pain stable, eating and moving bowels.     # Pain Assessment:  Current Pain Score 2020   Patient currently in pain? yes   Pain score (0-10) -   Add toradol.     O:   Vitals:  BP  Min: 132/66  Max: 166/86  Temp  Av.2  F (37.9  C)  Min: 99.5  F (37.5  C)  Max: 101.9  F (38.8  C)  Pulse  Av.5  Min: 84  Max: 94  I/O last 3 completed shifts:  In: 480 [P.O.:480]  Out: 0     Peripheral IV 20 Right Lower forearm (Active)   Site Assessment WDL 20 0036   Line Status Infusing 20 0036   Dressing Intervention New dressing  20 1542   Phlebitis Scale 1-->erythema at access site with or without pain 20 003   Infiltration Scale 0 20 0036   Infiltration Site Treatment Method  None 20 1542   Number of days: 5       Closed/Suction Drain Right;Anterior Perineal Bulb (Active)   Site Description WDL 20 0834   Dressing Status Normal: Clean, Dry & Intact 20 0834   Drainage Appearance Dark Red;Brown 2034   Status To bulb suction 2034   Output (ml) 0 ml 20 0036   Number of days: 6       Incision/Surgical Site with Packing 20 Left;Lower (Active)   Incision Assessment WDL 20 0036   Incision Drainage Amount UTV 20 0036   Drainage Description UTV 20 0036   Dressing Intervention Dressing marked - No change 20 1542   Number of days: 1       Incision/Surgical Site 20 Abdomen (Active)   Incision Assessment WDL 20 0036   Leila-Incision Assessment Warm 20 1542   Closure Liquid bandage 20 0036   Incision Drainage Amount None 20 0036   Drainage Description Other (Comment) 20 1615   Incision Care Soap and water 20 0036   Dressing Intervention Open to air / No Dressing 20 0036   Number of days: 14     No line/device    Physical Exam:  General: alert oriented, no acute distress   ENT: sclera non-icteric   Pulmonary: no respiratory distress    CVS: RRR  ABD: obese, tender on right side, significant more purulent drainage in bulb this am.  Erythema of lower pannus stable.    Extremities: WWP     Assessment/Plan:  55 y.o. female with post-op abscess following laparoscopic appendectomy. IR drain placed with good amount aspirated on 11/30. Pigtail left behind. Repeat CT 12/3 scan shows improved fluid collection in RLQ. There was increase gas over the suprapubic port site. Switched to oral antibiotics evening of 12/3. Lower incision opened and packed on 12/4.     Improved vitals and WBC count, more drainage noted in bulb today. No change clinically but like to make sure things appear stable on imaging prior to possible discharge home today with drain.          Albert Fernandez MD

## 2020-12-06 NOTE — DISCHARGE SUMMARY
[facility]    Discharge Summary  General Surgery    Date of Admission:  11/29/2020  Date of Discharge:  12/6/2020  5:15 PM  Discharging Provider: Albert Fernandez  Date of Service (when I saw the patient): 12/6/20    Discharge Diagnoses   Principal Problem:    Postprocedural intraabdominal abscess  Active Problems:    RLQ abdominal pain    S/P appy    Hypertension, unspecified type    Sepsis, due to unspecified organism, unspecified whether acute organ dysfunction present (H)      Procedure/Surgery Information   Procedure:    Surgeon(s): * Surgery not found *   Specimens: * Cannot find log *   Non-operative procedures Interventional radiology drain placement     History of Present Illness   Rosalie Stewart is a 55 year old female who presents with progressive RLQ abdominal pain. She had an uncomplicated laparoscopic appendectomy very early the morning of 11/23/2020. She recovered well the first three days, but had progressive pain, loss of appetite, nausea, and inability to sleep over the last 4 days. She was unable to sleep last night or take her antihypertensives due to the pain. She does report that she has been voiding and stooling without issue at home. Last bowel movement was non bloody and occurred on 11/28.     Hospital Course   Rosalie Stewart was admitted on 11/29/2020.  The following problems were addressed during her hospitalization:  Principal Problem:    Postprocedural intraabdominal abscess  Active Problems:    RLQ abdominal pain    S/P appy    Hypertension, unspecified type    Sepsis, due to unspecified organism, unspecified whether acute organ dysfunction present (H)  She underwent IR drain placement on 11/30, significant purulence was removed at that time. She was placed on antibiotics to target the bacteria growing in her drains. On follow up CT she was noted to have a fluid collection concerning for abscess at her suprapubic port site so this was opened at the bedside. She did have some  drainage that would be consistent with infection.  She had increased drain output on day of discharge but did repeat CT scan which showed good control of infection source. By time of discharge she was afebrile for 24 hours, pain was controlled on oral medications and his WBC count was trending down on oral medications.     # Discharge Pain Plan: oxycodone             Medications discontinued or adjusted during this hospitalization: see below      Antibiotics prescribed at discharge: see below      Imaging study follow up needs:   -    Significant Findings:   CT Abdomen Pelvis w Contrast   Final Result   IMPRESSION: Drain in place in the right lower quadrant. The right   lower quadrant abscesses have continued to decrease in size as   compared to previous examination on December 3, 2020.       STEPHEN ALBARADO MD      CT Abdomen Pelvis w Contrast   Final Result   IMPRESSION: Decrease in size in the intra-abdominal abscess on the   right following drain catheter placement.      Small low-density collection in the anterior abdominal wall containing   a droplet gases as increased slightly in thickness from previous   examination       STEPHEN ALBARADO MD      CT Abd Peritoneum Abscess Drain w Cath Place   Final Result   Impression:   1.  Technically successful CT-guided drain placement into a complex   right lower quadrant fluid collection. A 12 Hebrew locking pigtail   catheter was placed to bulb suction an affix the patient's skin at the   conclusion of the study.   2.  CT sinogram was also performed demonstrating communication from   the fluid collection to the large bowel and small bowel.      HESHAM CRAWFORD MD      CT Abdomen Pelvis w Contrast   Final Result   IMPRESSION:    Findings concerning for dehiscence following recent appendectomy with   a 6.2 x 3.2 x 3.8 cm localized abscess in the right hemiabdomen.   Appendicolith appears to persist in the right lower quadrant.      Localized peritonitis of the right  hemiabdomen with small fluid   collections concerning for small peritoneal abscesses involving both   the left and right hemiabdomen.      HESHAM CRAWFORD MD      IR Sinogram Injection Diagnostic    (Results Pending)         Albert Fernandez MD    Discharge Disposition   Discharged to home   Condition at discharge: Stable    Pending Results   Final pathology results: No pathology submitted  These results will be followed up by   Unresulted Labs Ordered in the Past 30 Days of this Admission     Date and Time Order Name Status Description    11/30/2020 1203 Anaerobic bacterial culture Preliminary           Primary Care Physician   Anne Morrison        Consultations This Hospital Stay   INTERVENTIONAL RADIOLOGY IP CONSULT    Time Spent on this Encounter   I have spent less than 30 minutes on this discharge.    Discharge Orders      IR Sinogram Injection Diagnostic     Reason for your hospital stay    You had a post-op infection from having your appendix removed     Follow-up and recommended labs and tests     Follow up with me, Albert Fernandez MD, in surgery clinic tomorrow for dressing change.     Activity    Your activity upon discharge: activity as tolerated     When to contact your care team    Fever > 101.3   Significant increase in abdominal pain.   Nausea and vomiting inability to keep down fluids.     Tubes and drains    You are going home with the following tubes or drains: HAIR.  Tube cares per hospital or home care instructions, keep bulb charged record daily output volumes. Ok to shower with drain in place     Diet    Follow this diet upon discharge: Orders Placed This Encounter      Low Fiber Diet     Discharge Medications   Current Discharge Medication List      START taking these medications    Details   acetaminophen (TYLENOL) 325 MG tablet Take 2 tablets (650 mg) by mouth every 4 hours as needed for mild pain  Qty: 30 tablet, Refills: 0    Associated Diagnoses: Postprocedural intraabdominal  abscess      amoxicillin-clavulanate (AUGMENTIN) 875-125 MG tablet Take 1 tablet by mouth every 12 hours for 7 days  Qty: 14 tablet, Refills: 0    Associated Diagnoses: Postprocedural intraabdominal abscess      oxyCODONE (ROXICODONE) 5 MG tablet Take 1 tablet (5 mg) by mouth every 3 hours as needed for moderate to severe pain  Qty: 20 tablet, Refills: 0    Associated Diagnoses: Postprocedural intraabdominal abscess         CONTINUE these medications which have CHANGED    Details   ibuprofen (ADVIL/MOTRIN) 600 MG tablet Take 1 tablet (600 mg) by mouth every 6 hours as needed for moderate pain  Qty: 30 tablet, Refills: 0    Associated Diagnoses: Postprocedural intraabdominal abscess         CONTINUE these medications which have NOT CHANGED    Details   Calcium Citrate-Vitamin D (CALCIUM + D PO) Take 1 tablet by mouth daily       fish oil-omega-3 fatty acids 1000 MG capsule Take 1 capsule by mouth daily      lisinopril-hydrochlorothiazide (ZESTORETIC) 20-25 MG tablet TAKE 1 TABLET BY MOUTH DAILY  Qty: 90 tablet, Refills: 0    Associated Diagnoses: Essential hypertension, benign      multivitamin, therapeutic with minerals (MULTI-VITAMIN) TABS tablet Take 1 tablet by mouth daily      senna-docusate (SENOKOT-S/PERICOLACE) 8.6-50 MG tablet Take 1-2 tablets by mouth 2 times daily Take while on oral narcotics to prevent or treat constipation.  Qty: 30 tablet, Refills: 0    Comments: While on narcotics  Associated Diagnoses: Acute appendicitis with localized peritonitis without abscess, unspecified whether gangrene present, unspecified whether perforation present      simvastatin (ZOCOR) 20 MG tablet TAKE 1 TABLET(20 MG) BY MOUTH AT BEDTIME  Qty: 90 tablet, Refills: 1    Associated Diagnoses: Hyperlipidemia, unspecified hyperlipidemia type         STOP taking these medications       HYDROcodone-acetaminophen (NORCO) 5-325 MG Comments:   Reason for Stopping:         naproxen sodium (ALEVE) 220 MG tablet Comments:   Reason  for Stopping:         oxyCODONE-acetaminophen (PERCOCET) 5-325 MG tablet Comments:   Reason for Stopping:             Allergies   Allergies   Allergen Reactions     Adhesive Tape      bandaids - non cloth     Seasonal Allergies      Data   Most Recent 3 CBC's:  Recent Labs   Lab Test 12/06/20  0535 12/05/20  0700 12/04/20  0528   WBC 11.1* 13.6* 12.6*   HGB 10.9* 11.7 11.5*   MCV 97 97 96   * 487* 454*      Most Recent 3 BMP's:  Recent Labs   Lab Test 12/05/20  0700 12/03/20  0529 12/02/20  0557 12/01/20 2053 12/01/20 0523 12/01/20 0523   NA  --  137 138  --   --  135   POTASSIUM  --  3.5 3.6 3.8   < > 3.2*   CHLORIDE  --  99 101  --   --  99   CO2  --  31 32  --   --  27   BUN  --  10 9  --   --  7   CR 0.74 0.75 0.73  --   --  0.67   ANIONGAP  --  7 5  --   --  9   ANGELA  --  8.7 8.8  --   --  8.7   GLC  --  102* 100*  --   --  105*    < > = values in this interval not displayed.     Most Recent 2 LFT's:  Recent Labs   Lab Test 11/29/20 0928 11/22/20  1844   AST 28 44   ALT 28 61*   ALKPHOS 79 76   BILITOTAL 0.4 0.7     Most Recent INR's and Anticoagulation Dosing History:  Anticoagulation Dose History     Recent Dosing and Labs Latest Ref Rng & Units 11/29/2020    INR 0.86 - 1.14 1.06        Most Recent 3 Troponin's:No lab results found.  Most Recent Cholesterol Panel:  Recent Labs   Lab Test 08/07/19  0743   CHOL 191   *   HDL 54   TRIG 182*     Most Recent 6 Bacteria Isolates From Any Culture (See EPIC Reports for Culture Details):  Recent Labs   Lab Test 11/30/20  1152 11/29/20  0944 11/29/20  0928 11/22/20  2202 11/22/20  2156   CULT No anaerobes isolated  Light growth  Escherichia coli  *  Moderate growth  Streptococcus viridans group  Susceptibility testing not routinely done  *  Strain 2  Light growth  Escherichia coli  *  Canceled, Test credited  Duplicate request No growth after 6 days No growth after 6 days No growth after 6 days No growth after 6 days     Most Recent TSH, T4 and  A1c Labs:  Recent Labs   Lab Test 01/17/18  1006   TSH 2.14

## 2020-12-06 NOTE — PLAN OF CARE
Patient alert and oriented x4. Gave PO oxy for complaints of abdominal pain prior to discharge home. Instructed patient how to strip and drain HAIR. No output in HAIR since start of evening shift. Abdominal dressings remain clean, dry, intact.       Patient discharged at 5:15 PM via wheel chair accompanied by significant other and staff. Prescriptions sent to patients preferred pharmacy. All belongings sent with patient.     Discharge instructions reviewed with patient. Listed belongings gathered and returned to patient.     Patient discharged to home.     Core Measures and Vaccines  Core Measures applicable during stay: No  Pneumonia and Influenza given prior to discharge, if indicated: N/A    Surgical Patient   Surgical Procedures during stay: abdominal abscess drained / packed and IR drain placement  Did patient receive discharge instruction on wound care and recognition of infection symptoms? Yes    MISC  Follow up appointment made:  No - patient instructed to make appointment tomorrow  Home and hospital aquired medications returned to patient: N/A  Patient reports pain was well managed at discharge: Yes

## 2020-12-06 NOTE — PLAN OF CARE
"Patient is alert and oriented, independent in room. PRN Roxicodone given once this shift for mild pain with relief. Redness on stomach appears to be going down, no drainage from HAIR drain at this time. IV fluids TKO. Patient states she is passing flatus. /64 (BP Location: Left arm)   Pulse 89   Temp 99.5  F (37.5  C) (Tympanic)   Resp 18   Ht 1.6 m (5' 3\")   Wt 108 kg (238 lb 1.6 oz)   SpO2 94%   BMI 42.18 kg/m       Face to face report given with opportunity to observe patient.    Report given to JOHN Winston RN   12/6/2020  7:04 AM        "

## 2020-12-06 NOTE — PLAN OF CARE
Alert & oriented uses call light appropriately. No complaints of pain in the beginning of this shift. Vital sighs stable, temperature 99.7.  is independent is room. Abdomen is red/warm to touch. Abdomen is marked and redness has not gone outside of markings. IV infusing at 25 ml/hr   Nursing assessment as charted.  Requested and received Toradol 30 mg via IV at 1750. No further complaints of pain this shift.

## 2020-12-06 NOTE — DISCHARGE INSTRUCTIONS
What to expect when you have contrast    During your exam, we will inject  contrast  into your vein or artery. (Contrast is a clear liquid with iodine in it. It shows up on X-rays.)    You may feel warm or hot. You may have a metal taste in your mouth and a slight upset stomach. You may also feel pressure near the kidneys and bladder. These effects will last about 1 to 3 minutes.    Please tell us if you have:    Sneezing     Itching    Hives     Swelling in the face    A hoarse voice    Breathing problems    Other new symptoms    Serious problems are rare.  They may include:    Irregular heartbeat     Seizures    Kidney failure              Tissue damage    Shock      Death    If you have any problems during the exam, we  will treat them right away.    When you get home    Call your hospital if you have any new symptoms in the next 2 days, like hives or swelling. (Phone numbers are at the bottom of this page.) Or call your family doctor.     If you have wheezing or trouble breathing, call 911.    Self-care  -Drink at least 4 extra glasses of water today.   This reduces the stress on your kidneys.  -Keep taking your regular medicines.    The contrast will pass out of your body in your  Urine(pee). This will happen in the next 24 hours. You  will not feel this. Your urine will not  change color.    If you have kidney problems or take metformin    Drink 4 to 8 large glasses of water for the next  2 days, if you are not on a fluid restriction.    ?If you take metformin (Glucophage or Glucovance) for diabetes, keep taking it.      ?Your kidney function tests are abnormal.  If you take Metformin, do not take it for 48 hours. Please go to your clinic for a blood test within 3 days after your exam before the restarting this medicine.     (Note to provider:please give patient prescription for lab tests.)    ?Special instructions: -    I have read and understand the above information.    Patient Sign  Here:______________________________________Date:________Time:______    Staff Sign Here:________________________________________Date:_______Time:______      Radiology Departments:     ?[facility]: [phone] ?[facility]: [phone]     ?[place]: [phone] ?[facility]:[phone]      ?[facility]: [phone]  ?[place]: [phone]  ?[place]:[phone]    ?[facility]:[phone]  ?[facility]:[phone]      POST OPERATIVE PATIENT INFORMATION  Caring For Your Cristian Layton Drain      What is a Cristian Layton drain?    This is a small, flexible, plastic bulb that creates a gentle suction to remove extra fluid from your surgical wound.  The color and amount of fluid varies.  Immediately after surgery, the fluid is bright red and it gradually becomes a straw color.  When the amount decreases, your physician will remove it.    Daily care for your Cristian Layton:    Keep the bulb compressed at all times except while you empty it.  This creates the suction.  During the first few days after surgery, there is usually more fluid in the container.  Empty at least two times a day or more often if it becomes half full.  If it becomes too full, there will not be enough suction.    Where the tubes enter, keep the skin dry and covered with a light dressing.  If you notice any drainage around the tube sites, change the dressing.  If you see a clot in the tube, strip it (your nurse will show you how) and if the tube does not appear to be draining, call your physician.    Tape the tubes to the skin with paper tape 1-2 inches below the insertion sites.  The tubes are stitched in place and will not slip out.  Taping the tubes to the skin helps prevent pulling on the stitches.    You may take a shower, but keep the tubes and container dry.    Secure the container to your clothing with tape and a safety pin.    Always empty the bulb at the same time every day as instructed by your physician (or as necessary if it becomes    full) and keep track of the amount of drainage.    To empty the container:  1. Wash your hands.  2. Open the stopper to release the suction.  3. Hold the stopper out of the way and empty the drainage in the measuring container that has been sent home with you.  4. Measure and write down the amount.  If there are two drains, write down both amounts separately.  5. Compress the bulb by folding it in half.  6. Replace the stopper.  7. Check to see that the tubing is taped to your skin.  Pin the bulb to your clothing.  8. Flush the drainage down the toilet and rinse the measuring cup.  Wash your hands.    Call you doctor if:    The drainage increases or has a bad odor    There is increased redness, warmth, or  pus-like  drainage around the tube insertion sites     The tube does not appear to be draining or falls out    You have problems or concerns    After clinic hours, call the Emergency Room.          Record your drainage amounts and bring this with you to your office visit.    DATE TIME TUBE 1 TUBE 2

## 2020-12-06 NOTE — PHARMACY
Pharmacy Antimicrobial Stewardship Assessment     Current Antimicrobial Therapy:  Anti-infectives (From now, onward)    Start     Dose/Rate Route Frequency Ordered Stop    12/03/20 2000  amoxicillin-clavulanate (AUGMENTIN) 875-125 MG per tablet 1 tablet      1 tablet Oral EVERY 12 HOURS SCHEDULED 12/03/20 1400            Indication: abscess    Days of Therapy: 4     Pertinent Labs:  Creatinine   Date Value Ref [facility] Status   12/05/2020 0.74 0.52 - 1.04 mg/dL Final   12/03/2020 0.75 0.52 - 1.04 mg/dL Final   12/02/2020 0.73 0.52 - 1.04 mg/dL Final     WBC   Date Value Ref [place] Status   12/06/2020 11.1 (H) 4.0 - 11.0 10e9/L Final   12/05/2020 13.6 (H) 4.0 - 11.0 10e9/L Final   12/04/2020 12.6 (H) 4.0 - 11.0 10e9/L Final     Procalcitonin   Date Value Ref [facility] Status   11/29/2020 0.28 ng/ml Final     Comment:     0.25-0.49 ng/ml  Possible early systemic infection or localized infection.     Recommendation: Encourage antibiotics only in the correct clinical context.   Consider obtaining blood cultures or other relevant cultures. Recheck PCT in   6-12 hours to ensure baseline low level. If repeat PCT is rising, consider   early systemic infection and consider starting antibiotics.       CRP Inflammation   Date Value Ref [facility] Status   11/18/2019 4.2 0.0 - 8.0 mg/L Final       Culture Results: 11/29 COVID negative         Recommendations/Interventions:  1. E. coli identified in appendix is sensitive to Augmentin. No recommendations at this time.    Parth Isabel, [facility]  December 6, 2020

## 2020-12-06 NOTE — PLAN OF CARE
Face to face report given with opportunity to observe patient.    Report given to JOHN Cartagena RN   12/5/2020  10:52 PM

## 2020-12-06 NOTE — PLAN OF CARE
Shift assessment complete as charted.  Medicated with Toradol 30mg IV and [place] 5mg po for right sided abd pain.  Pain meds did decrease pain to patient stated acceptable level.  Had CT scan of abd today.  Dr. Fernandez states there is nothing in the CT scan that would indicate  that patient needs to stay in hospital.  Pt has been discharged home.

## 2020-12-06 NOTE — PROGRESS NOTES
Reviewed CT intraabdominal infection getting smaller with drain, low midline incision draining as well. Believe all of this can be managed as an outpatient now.  Will plan on discharge with close follow up.     Albert Fernandez MD

## 2020-12-07 ENCOUNTER — OFFICE VISIT (OUTPATIENT)
Dept: SURGERY | Facility: OTHER | Age: 55
End: 2020-12-07
Attending: CLINICAL NURSE SPECIALIST
Payer: COMMERCIAL

## 2020-12-07 VITALS
RESPIRATION RATE: 16 BRPM | DIASTOLIC BLOOD PRESSURE: 72 MMHG | OXYGEN SATURATION: 95 % | TEMPERATURE: 98.3 F | HEART RATE: 111 BPM | SYSTOLIC BLOOD PRESSURE: 122 MMHG

## 2020-12-07 DIAGNOSIS — T14.8XXA OPEN WOUND: Primary | ICD-10-CM

## 2020-12-07 LAB
BACTERIA SPEC CULT: NORMAL
Lab: NORMAL
SPECIMEN SOURCE: NORMAL

## 2020-12-07 PROCEDURE — 99213 OFFICE O/P EST LOW 20 MIN: CPT | Performed by: CLINICAL NURSE SPECIALIST

## 2020-12-07 ASSESSMENT — PAIN SCALES - GENERAL: PAINLEVEL: MILD PAIN (3)

## 2020-12-08 ENCOUNTER — ALLIED HEALTH/NURSE VISIT (OUTPATIENT)
Dept: SURGERY | Facility: OTHER | Age: 55
End: 2020-12-08
Attending: CLINICAL NURSE SPECIALIST
Payer: COMMERCIAL

## 2020-12-08 VITALS — TEMPERATURE: 98.8 F

## 2020-12-08 DIAGNOSIS — Z48.00 ENCOUNTER FOR CHANGE OF DRESSING: Primary | ICD-10-CM

## 2020-12-08 PROCEDURE — G0463 HOSPITAL OUTPT CLINIC VISIT: HCPCS

## 2020-12-08 ASSESSMENT — PAIN SCALES - GENERAL: PAINLEVEL: MILD PAIN (3)

## 2020-12-08 NOTE — PROGRESS NOTES
"Patient is here today for dressing change to low center abdomen after appendectomy. Temp 98.8. The patient reports no pain at site of wound and pain that is a \"3\" out of 10 at RLQ abdomen. Moderate amount of bloody drainage noted on packing strip upon removal. The area surrounding abdomen is outlined with skin marker and area inside marker seems to be very slightly more pink than surrounding skin or maybe even the same and patient indicates that this is much improved. No odor noted. Wound is 1.75 cm deep. Surrounding area gently cleansed with water and baby soap. Wound gently repacked with 1/4\" iodoform packing strip and dressed with folded 4x4 gauze and medipore tape. She will return tomorrow for nurse only visit for dressing change and report any issues with dressings as she states that she does have sensitive skin.   "

## 2020-12-08 NOTE — PROGRESS NOTES
"SUBJECTIVE:  Rosalie Stewart, 55 year old, female presents with the following Chief Complaint(s) with HPI to follow:   Chief Complaint   Patient presents with     Dressing Change     groin area          HPI:  Rosalie is here for the assessment and treatment of a wound to the left/mid lower abdomen.  She also has a HAIR drain to the RLQ.  She was discharged from the hospital yesterday.  She reports eating without difficulty, good pain control, no dressing concerns, passing stools without difficulty, specifically denies fever or chills.    Past history:  She presented to the ED on 11/29/2020 with progressive RLQ abdominal pain.  She had an uncomplicated laparoscopic appendectomy in the very early morning of 11/23/2020.  She was recovering well the first three days, but had progressive pain, loss of appetite, nausea and inability to sleep and therefore presented to the ED.  She was admitted on 11/29/2020.  She had a drain placed in interventional radiology, significant purulence was removed at that time.  She was placed on antibiotics.  CT scan revealed a fluid collection concerning for abscess at her suprapubic port site, so this was opened at the bedside per Dr. Fernandez, with 1/4\" plain packing ribbon inserted for wicking.  She was discharged on 12/6/2020 with the RLQ HAIR drain in place and the lower mid-abdominal wound packing in place.      Patient Active Problem List   Diagnosis     Benign essential hypertension     Anxiety     Obesity, Class II, BMI 35-39.9, with comorbidity     Hyperlipidemia, unspecified hyperlipidemia type     Morbid obesity (H)     Abdominal pain, generalized     Diarrhea, unspecified type     Acute appendicitis with localized peritonitis without abscess, unspecified whether gangrene present, unspecified whether perforation present     RLQ abdominal pain     S/P appy     Postprocedural intraabdominal abscess     Hypertension, unspecified type     Sepsis, due to unspecified organism, unspecified " whether acute organ dysfunction present (H)       Past Medical History:   Diagnosis Date     Anxiety      Arthralgia     negative lyme, VINAY, RF, TSH, 2017     Arthritis      Benign essential hypertension      Postmenopausal bleeding     ? U/S & Endometrial biopsy done at [facility]?       Past Surgical History:   Procedure Laterality Date      tubal ligation Bilateral           SECTION      2     COLONOSCOPY      negative     ENDOSCOPY      negative     LAPAROSCOPIC APPENDECTOMY N/A 2020    Procedure: APPENDECTOMY, LAPAROSCOPIC;  Surgeon: Keegan Burton DO;  Location: [facility]       Family History   Family history unknown: Yes       Social History     Tobacco Use     Smoking status: Never Smoker     Smokeless tobacco: Never Used   Substance Use Topics     Alcohol use: Yes     Comment: occasionally, 2x/week       Current Outpatient Medications   Medication Sig Dispense Refill     acetaminophen (TYLENOL) 325 MG tablet Take 2 tablets (650 mg) by mouth every 4 hours as needed for mild pain 30 tablet 0     amoxicillin-clavulanate (AUGMENTIN) 875-125 MG tablet Take 1 tablet by mouth every 12 hours for 7 days 14 tablet 0     Calcium Citrate-Vitamin D (CALCIUM + D PO) Take 1 tablet by mouth daily        fish oil-omega-3 fatty acids 1000 MG capsule Take 1 capsule by mouth daily       ibuprofen (ADVIL/MOTRIN) 600 MG tablet Take 1 tablet (600 mg) by mouth every 6 hours as needed for moderate pain 30 tablet 0     lisinopril-hydrochlorothiazide (ZESTORETIC) 20-25 MG tablet TAKE 1 TABLET BY MOUTH DAILY 90 tablet 0     multivitamin, therapeutic with minerals (MULTI-VITAMIN) TABS tablet Take 1 tablet by mouth daily       oxyCODONE (ROXICODONE) 5 MG tablet Take 1 tablet (5 mg) by mouth every 3 hours as needed for moderate to severe pain 20 tablet 0     simvastatin (ZOCOR) 20 MG tablet TAKE 1 TABLET(20 MG) BY MOUTH AT BEDTIME 90 tablet 1     senna-docusate (SENOKOT-S/PERICOLACE) 8.6-50 MG  "tablet Take 1-2 tablets by mouth 2 times daily Take while on oral narcotics to prevent or treat constipation. (Patient not taking: Reported on 12/7/2020) 30 tablet 0       Allergies   Allergen Reactions     Adhesive Tape      bandaids - non cloth     Seasonal Allergies        REVIEW OF SYSTEMS  Skin: as above  Eyes: negative  Ears/Nose/Throat: negative  Respiratory: No shortness of breath, dyspnea on exertion, cough, or hemoptysis  Cardiovascular: negative  Gastrointestinal: negative  Genitourinary: negative  Musculoskeletal: positive for arthralgia  Neurologic: negative  Psychiatric: depression stable  Hematologic/Lymphatic/Immunologic: negative  Endocrine: negative    OBJECTIVE:    B/P: 122/72, T: 98.3, P: 111, R: 16, W: 0 lbs 0 oz, BMI: There is no height or weight on file to calculate BMI.  Constitutional: alert and no distress  Head: Normocephalic. No masses, lesions, tenderness or abnormalities  Cardiovascular: RRR. No murmurs, clicks gallops or rub  Respiratory:  Good diaphragmatic excursion. Lungs clear  Gastrointestinal: Abdomen soft, non-tender. BS normal. No masses, organomegaly  : Deferred  Musculoskeletal: extremities normal- no gross deformities noted, gait normal and normal muscle tone  Skin:        Wound description:     Type of Wound:  surgical   Location:  Lower mid-abdomen    Drainage amount:  small   Drainage color:  serosanguinous   Odor:  none   Wound bed:  Unable to visualize   Surrounding skin:  Faint erythema, no warmth        Measurements:  Tunnels 8.3 cm towards 0900   Pain:  \"well controlled\"       Dressing change:      Wound cleansed with mild soap, rinse, dried.  Lightly packed wound with 1/2\" iodoform ribbon.  Covered with dry gauze, folded in 1/4's, secured with medipore tape.  Neurologic: Gait normal. Sensation grossly WNL.  Psychiatric: mentation appears normal and affect normal/bright    THERAPY GOAL:    Complete healing    ASSESSMENT / PLAN:    Comments:  No purulence with the " "drainage.  Dr. Fernandez stopped in to see patient, no new orders.  Plan:  Daily dressing changes with 1/4\" iodoform packing ribbon, cover with dry gauze folded in 1/4's, cover with dry gauze, folded in 1/4's, secure with medipore tape    Follow-up in 1 day or as needed for acute concerns.    Meli Hook CNS  Surgery and Wound Care  [facility]  "

## 2020-12-08 NOTE — PATIENT INSTRUCTIONS
Return tomorrow for nurse only visit for dressing change. Report any concerns to the nurse at your visit or by calling our office at [phone] or [phone].

## 2020-12-09 ENCOUNTER — ALLIED HEALTH/NURSE VISIT (OUTPATIENT)
Dept: SURGERY | Facility: OTHER | Age: 55
End: 2020-12-09
Attending: CLINICAL NURSE SPECIALIST
Payer: COMMERCIAL

## 2020-12-09 DIAGNOSIS — Z48.00 ENCOUNTER FOR CHANGE OF DRESSING: Primary | ICD-10-CM

## 2020-12-09 NOTE — NURSING NOTE
Patient presented to the clinic for a dressing change to her abdomen.  The dressing was removed, and the skin surrounding the wound was cleansed.  The packing was removed from the wound without difficulty.  Dr Fernandez came in to see the patient after the dressing was removed.  After he was done, the wound was repacked with 1/4 inch iodoform packing, and covered with a 4x4 gauze folded into quarters and taped into place.  A new dressing was also placed around her tubing as it was leaking.  The patient will return to the clinic tomorrow (12/10/20) for another dressing change.  VERENICE FOSTER LPN

## 2020-12-10 ENCOUNTER — ALLIED HEALTH/NURSE VISIT (OUTPATIENT)
Dept: SURGERY | Facility: OTHER | Age: 55
End: 2020-12-10
Attending: CLINICAL NURSE SPECIALIST
Payer: COMMERCIAL

## 2020-12-10 DIAGNOSIS — Z48.00 ENCOUNTER FOR CHANGE OF DRESSING: Primary | ICD-10-CM

## 2020-12-10 NOTE — NURSING NOTE
Patient presented to the clinic for a dressing change to her abdomen.  The dressing was removed and the skin surrounding the wound was cleansed.  The packing was removed from the wound.  The wound looks clean and there is no current sign of infection.  The wound was then repacked using 1/4 inch iodoform packing.  The wound was covered with a 4x4 folded in quarters and taped in place.  The patient will return tomorrow for another dressing change and will need her directions for the dressing change and supplies to present to urgent care over the weekend.  VERENICE FOSTER LPN

## 2020-12-11 ENCOUNTER — TELEPHONE (OUTPATIENT)
Dept: SURGERY | Facility: OTHER | Age: 55
End: 2020-12-11

## 2020-12-11 ENCOUNTER — ALLIED HEALTH/NURSE VISIT (OUTPATIENT)
Dept: SURGERY | Facility: OTHER | Age: 55
End: 2020-12-11
Attending: CLINICAL NURSE SPECIALIST
Payer: COMMERCIAL

## 2020-12-11 DIAGNOSIS — Z48.00 ENCOUNTER FOR CHANGE OF DRESSING: Primary | ICD-10-CM

## 2020-12-11 NOTE — TELEPHONE ENCOUNTER
Patient is wondering if she needs more antibiotics over the weekend.  She will be done as of Sunday.  There is no increased pain or signs of infection.  Thanks

## 2020-12-11 NOTE — NURSING NOTE
Patient presented to the clinic for her dressing change to her abdominal wound.  The dressing was removed from the wound and the packing was removed from the wound without difficulty.  The skin surrounding the wound was cleansed with water and baby soap.  The wound was measured and is 1.5 cm deep.  There is no sign of infection.  The wound was then repacked with 1/4 inch iodoform packing.  The wound was covered with a 4x4 folded in quarters and taped into place over the wound.  The other site on the patient's right upper abdomen was dressed also with a 4x4 and taped in place.  The patient was given written instructions and supplies to bring with her to urgent care over the weekend.  She will come back to the clinic on Monday 12/14/20 for another dressing change.    VERENICE FOSTER LPN

## 2020-12-11 NOTE — TELEPHONE ENCOUNTER
Patient notified that she doesn't need anymore at this point.  She agreed to the plan.  VERENICE FOSTER LPN

## 2020-12-12 ENCOUNTER — HOSPITAL ENCOUNTER (EMERGENCY)
Facility: HOSPITAL | Age: 55
Discharge: HOME | End: 2020-12-12
Admitting: NURSE PRACTITIONER
Payer: COMMERCIAL

## 2020-12-12 VITALS
RESPIRATION RATE: 16 BRPM | DIASTOLIC BLOOD PRESSURE: 82 MMHG | HEART RATE: 100 BPM | TEMPERATURE: 98.3 F | OXYGEN SATURATION: 95 % | SYSTOLIC BLOOD PRESSURE: 115 MMHG

## 2020-12-12 PROCEDURE — G0463 HOSPITAL OUTPT CLINIC VISIT: HCPCS

## 2020-12-12 NOTE — ED TRIAGE NOTES
Pt is here needing a dressing change  Dressing changed to lower abd   Wound was cleaned, packed and redressed   Pt tolerated well   Pt had no further questions  Pt ambulated out [facility] doors

## 2020-12-13 ENCOUNTER — HOSPITAL ENCOUNTER (EMERGENCY)
Facility: HOSPITAL | Age: 55
Discharge: HOME | End: 2020-12-13
Admitting: NURSE PRACTITIONER
Payer: COMMERCIAL

## 2020-12-13 VITALS
OXYGEN SATURATION: 95 % | DIASTOLIC BLOOD PRESSURE: 83 MMHG | SYSTOLIC BLOOD PRESSURE: 132 MMHG | HEART RATE: 89 BPM | RESPIRATION RATE: 16 BRPM | TEMPERATURE: 97.5 F

## 2020-12-13 PROCEDURE — G0463 HOSPITAL OUTPT CLINIC VISIT: HCPCS

## 2020-12-13 NOTE — ED TRIAGE NOTES
Nurse only visit for wound care. Packing removed from wound of lower left abdominal area. Drainage on packing was red in color. Cleaned area with warm soapy water. Repacked wound and placed a covering over area. Pt tolerated well.

## 2020-12-14 ENCOUNTER — HOSPITAL ENCOUNTER (OUTPATIENT)
Facility: HOSPITAL | Age: 55
Discharge: HOME | End: 2020-12-14
Attending: RADIOLOGY | Admitting: RADIOLOGY
Payer: COMMERCIAL

## 2020-12-14 ENCOUNTER — ALLIED HEALTH/NURSE VISIT (OUTPATIENT)
Dept: SURGERY | Facility: OTHER | Age: 55
End: 2020-12-14
Attending: SURGERY
Payer: COMMERCIAL

## 2020-12-14 ENCOUNTER — HOSPITAL ENCOUNTER (OUTPATIENT)
Dept: INTERVENTIONAL RADIOLOGY/VASCULAR | Facility: HOSPITAL | Age: 55
End: 2020-12-14
Attending: SURGERY
Payer: COMMERCIAL

## 2020-12-14 VITALS
SYSTOLIC BLOOD PRESSURE: 114 MMHG | DIASTOLIC BLOOD PRESSURE: 82 MMHG | HEIGHT: 63 IN | HEART RATE: 108 BPM | OXYGEN SATURATION: 97 % | WEIGHT: 238 LBS | BODY MASS INDEX: 42.17 KG/M2 | TEMPERATURE: 97.8 F

## 2020-12-14 DIAGNOSIS — K65.1 POSTPROCEDURAL INTRAABDOMINAL ABSCESS (H): ICD-10-CM

## 2020-12-14 DIAGNOSIS — T81.43XA POSTPROCEDURAL INTRAABDOMINAL ABSCESS (H): ICD-10-CM

## 2020-12-14 DIAGNOSIS — Z48.00 ENCOUNTER FOR CHANGE OF DRESSING: Primary | ICD-10-CM

## 2020-12-14 PROCEDURE — 76080 X-RAY EXAM OF FISTULA: CPT

## 2020-12-14 PROCEDURE — 255N000002 HC RX 255 OP 636: Performed by: RADIOLOGY

## 2020-12-14 RX ORDER — OXYCODONE AND ACETAMINOPHEN 5; 325 MG/1; MG/1
TABLET ORAL
Status: ON HOLD | COMMUNITY
Start: 2020-11-23 | End: 2020-12-14

## 2020-12-14 RX ORDER — IOPAMIDOL 612 MG/ML
50 INJECTION, SOLUTION INTRAVASCULAR ONCE
Status: COMPLETED | OUTPATIENT
Start: 2020-12-14 | End: 2020-12-14

## 2020-12-14 RX ADMIN — IOPAMIDOL 20 ML: 612 INJECTION, SOLUTION INTRAVENOUS at 08:26

## 2020-12-14 ASSESSMENT — MIFFLIN-ST. JEOR: SCORE: 1643.69

## 2020-12-14 ASSESSMENT — PAIN SCALES - GENERAL: PAINLEVEL: MILD PAIN (2)

## 2020-12-14 NOTE — NURSING NOTE
"Patient presented to the clinic for her dressing change to her abdominal wound.  The dressing was removed from the wound and the packing was removed from the wound without difficulty.  The skin surrounding the wound was cleansed with water and baby soap.  The wound was measured and is 1.5 cm deep.  There is no sign of infection.  The wound was then repacked with 1/4 inch iodoform packing.  The wound was covered with a 4x4 folded in quarters and taped into place over the wound.  The other site on the patient's right upper abdomen was dressed also with a 4x4 and taped in place. The patient will return to clinic on 12/15/20 for another dressing change. Milagros Wheeler LPN    No chief complaint on file.      Initial /82 (BP Location: Left arm, Patient Position: Chair, Cuff Size: Adult Large)   Pulse 108   Temp 97.8  F (36.6  C) (Tympanic)   Ht 1.6 m (5' 3\")   Wt 108 kg (238 lb)   SpO2 97%   BMI 42.16 kg/m   Estimated body mass index is 42.16 kg/m  as calculated from the following:    Height as of this encounter: 1.6 m (5' 3\").    Weight as of this encounter: 108 kg (238 lb).  Medication Reconciliation: complete  Milagros Wheeler LPN      "

## 2020-12-14 NOTE — PATIENT INSTRUCTIONS
Wound Care Instructions:  1) Wash your hands and work space  2) Gather all your supplies: clean wash cloths, mild soap (baby soap),   3) Cleanse wound with mild soap, rinse, pat dry  4) Dress wound with gauze   5) Change dressing   6) Dispose of all dirty supplies  7) Wash hands and equipment    Please report any increase in pain, fevers, chills, changes in the drainage odor.   Please call [phone] if you have any questions or concerns or if any problems develop.     Follow up Tuesday, December 15,2020

## 2020-12-15 ENCOUNTER — OFFICE VISIT (OUTPATIENT)
Dept: SURGERY | Facility: OTHER | Age: 55
End: 2020-12-15
Attending: SURGERY
Payer: COMMERCIAL

## 2020-12-15 VITALS
HEART RATE: 95 BPM | SYSTOLIC BLOOD PRESSURE: 118 MMHG | TEMPERATURE: 97.6 F | WEIGHT: 238 LBS | DIASTOLIC BLOOD PRESSURE: 84 MMHG | BODY MASS INDEX: 42.17 KG/M2 | HEIGHT: 63 IN | OXYGEN SATURATION: 95 %

## 2020-12-15 DIAGNOSIS — Z48.00 ENCOUNTER FOR CHANGE OF DRESSING: Primary | ICD-10-CM

## 2020-12-15 PROCEDURE — G0463 HOSPITAL OUTPT CLINIC VISIT: HCPCS

## 2020-12-15 ASSESSMENT — MIFFLIN-ST. JEOR: SCORE: 1643.69

## 2020-12-15 ASSESSMENT — PAIN SCALES - GENERAL: PAINLEVEL: MILD PAIN (3)

## 2020-12-15 NOTE — NURSING NOTE
"Patient presented to the clinic for her dressing change to her abdominal wound.  The dressing was removed from the wound and the packing was removed from the wound without difficulty.  The skin surrounding the wound was cleansed with water and baby soap.  The wound was measured and is 1.5 cm deep.  There is no sign of infection.  The wound was then repacked with 1/4 inch iodoform packing.  The wound was covered with a 4x4 folded in quarters and taped into place over the wound.  The other site on the patient's right upper abdomen was dressed also with a 4x4 and taped in place. The patient will return to clinic on 12/16/20 for another dressing change. Milagros Wheeler LPN    No chief complaint on file.      Initial /84 (BP Location: Left arm, Patient Position: Chair, Cuff Size: Adult Large)   Pulse 95   Temp 97.6  F (36.4  C) (Tympanic)   Ht 1.6 m (5' 3\")   Wt 108 kg (238 lb)   SpO2 95%   BMI 42.16 kg/m   Estimated body mass index is 42.16 kg/m  as calculated from the following:    Height as of this encounter: 1.6 m (5' 3\").    Weight as of this encounter: 108 kg (238 lb).  Medication Reconciliation: complete  Milagros Wheeler LPN    "

## 2020-12-15 NOTE — PATIENT INSTRUCTIONS
Thank you for allowing Dr. Fernandez and our surgical team to participate in your care. Please call our health unit coordinator at [phone] with scheduling questions or the nurse at [phone] with any other questions or concerns.

## 2020-12-16 ENCOUNTER — OFFICE VISIT (OUTPATIENT)
Dept: SURGERY | Facility: OTHER | Age: 55
End: 2020-12-16
Attending: SURGERY
Payer: COMMERCIAL

## 2020-12-16 VITALS
SYSTOLIC BLOOD PRESSURE: 120 MMHG | OXYGEN SATURATION: 96 % | TEMPERATURE: 97.4 F | DIASTOLIC BLOOD PRESSURE: 86 MMHG | HEART RATE: 91 BPM | WEIGHT: 238 LBS | BODY MASS INDEX: 42.17 KG/M2 | HEIGHT: 63 IN

## 2020-12-16 DIAGNOSIS — K65.1 RIGHT LOWER QUADRANT ABDOMINAL ABSCESS (H): ICD-10-CM

## 2020-12-16 DIAGNOSIS — G89.18 ACUTE POST-OPERATIVE PAIN: Primary | ICD-10-CM

## 2020-12-16 DIAGNOSIS — Z48.00 ENCOUNTER FOR CHANGE OF DRESSING: ICD-10-CM

## 2020-12-16 PROCEDURE — 99024 POSTOP FOLLOW-UP VISIT: CPT | Performed by: SURGERY

## 2020-12-16 RX ORDER — HYDROCODONE BITARTRATE AND ACETAMINOPHEN 5; 325 MG/1; MG/1
1 TABLET ORAL EVERY 6 HOURS PRN
Qty: 15 TABLET | Refills: 0 | Status: SHIPPED | OUTPATIENT
Start: 2020-12-16 | End: 2020-12-21

## 2020-12-16 ASSESSMENT — MIFFLIN-ST. JEOR: SCORE: 1643.69

## 2020-12-16 ASSESSMENT — PAIN SCALES - GENERAL: PAINLEVEL: MODERATE PAIN (5)

## 2020-12-16 NOTE — PATIENT INSTRUCTIONS
Thank you for allowing Dr. Fernandez and our surgical team to participate in your care. Please call our health unit coordinator at [phone] with scheduling questions or the nurse at [phone] with any other questions or concerns.

## 2020-12-17 ENCOUNTER — OFFICE VISIT (OUTPATIENT)
Dept: SURGERY | Facility: OTHER | Age: 55
End: 2020-12-17
Attending: SURGERY
Payer: COMMERCIAL

## 2020-12-17 VITALS
OXYGEN SATURATION: 95 % | HEIGHT: 63 IN | TEMPERATURE: 95.4 F | HEART RATE: 73 BPM | BODY MASS INDEX: 42.17 KG/M2 | DIASTOLIC BLOOD PRESSURE: 78 MMHG | WEIGHT: 238 LBS | SYSTOLIC BLOOD PRESSURE: 118 MMHG

## 2020-12-17 DIAGNOSIS — Z48.00 ENCOUNTER FOR CHANGE OF DRESSING: Primary | ICD-10-CM

## 2020-12-17 PROCEDURE — G0463 HOSPITAL OUTPT CLINIC VISIT: HCPCS

## 2020-12-17 ASSESSMENT — MIFFLIN-ST. JEOR: SCORE: 1643.69

## 2020-12-17 ASSESSMENT — PAIN SCALES - GENERAL: PAINLEVEL: MILD PAIN (3)

## 2020-12-17 NOTE — PATIENT INSTRUCTIONS
Thank you for allowing Dr. Fernandez and our surgical team to participate in your care. Please call our health unit coordinator at [phone] with scheduling questions or the nurse at [phone] with any other questions or concerns.

## 2020-12-17 NOTE — NURSING NOTE
"Patient presented to the clinic for her dressing change to her abdominal wound.  The dressing was removed from the wound and the packing was removed from the wound without difficulty.  The skin surrounding the wound was cleansed with water and baby soap.  The wound was measured and is 1.5 cm deep.  There is no sign of infection.  The wound was then repacked with 1/4 inch iodoform packing.  The wound was covered with a 4x4 folded in quarters and taped into place over the wound.  The other site on the patient's right upper abdomen was dressed also with a 4x4 and taped in place. The patient will return to clinic on 12/18/20 for another dressing change. Milagros Wheeler LPN    No chief complaint on file.      Initial /78 (BP Location: Right arm, Patient Position: Chair, Cuff Size: Adult Large)   Pulse 73   Temp 95.4  F (35.2  C) (Tympanic)   Ht 1.6 m (5' 3\")   Wt 108 kg (238 lb)   SpO2 95%   BMI 42.16 kg/m   Estimated body mass index is 42.16 kg/m  as calculated from the following:    Height as of this encounter: 1.6 m (5' 3\").    Weight as of this encounter: 108 kg (238 lb).  Medication Reconciliation: complete  Milagros Wheeler LPN    "

## 2020-12-17 NOTE — PROGRESS NOTES
"No chief complaint on file.      Initial /78 (BP Location: Right arm, Patient Position: Chair, Cuff Size: Adult Large)   Pulse 73   Temp 95.4  F (35.2  C) (Tympanic)   Ht 1.6 m (5' 3\")   Wt 108 kg (238 lb)   SpO2 95%   BMI 42.16 kg/m   Estimated body mass index is 42.16 kg/m  as calculated from the following:    Height as of this encounter: 1.6 m (5' 3\").    Weight as of this encounter: 108 kg (238 lb).  Medication Reconciliation: complete  Milagros Wheeler LPN    "

## 2020-12-18 ENCOUNTER — OFFICE VISIT (OUTPATIENT)
Dept: SURGERY | Facility: OTHER | Age: 55
End: 2020-12-18
Attending: FAMILY MEDICINE
Payer: COMMERCIAL

## 2020-12-18 VITALS
SYSTOLIC BLOOD PRESSURE: 115 MMHG | TEMPERATURE: 98.2 F | OXYGEN SATURATION: 98 % | DIASTOLIC BLOOD PRESSURE: 84 MMHG | HEART RATE: 89 BPM

## 2020-12-18 DIAGNOSIS — T14.8XXA OPEN WOUND: Primary | ICD-10-CM

## 2020-12-18 ASSESSMENT — PAIN SCALES - GENERAL: PAINLEVEL: NO PAIN (0)

## 2020-12-18 NOTE — NURSING NOTE
"Chief Complaint   Patient presents with     Allied Health Visit       Initial /84 (BP Location: Right arm, Patient Position: Sitting, Cuff Size: Adult Large)   Pulse 89   Temp 98.2  F (36.8  C) (Tympanic)   SpO2 98%  Estimated body mass index is 42.16 kg/m  as calculated from the following:    Height as of 12/17/20: 1.6 m (5' 3\").    Weight as of 12/17/20: 108 kg (238 lb).  Medication Reconciliation: unable or not appropriate to perform  Sugey Durán LPN      Wound change to lower Abdomen wound measured 1.5 cm. Margins slightly red, drain bright red, with no s/s of infection. Abd. Wound packed with iodoform and covered with 2x2 and 4x4. Patient tolerated well. Right upper abdomen new dressing change at tube site, slight brownish red drainage, margines slight red, no s/s of infection. Patient given supplies for dressing change over the weekend.   Sugey Durán LPN    "

## 2020-12-18 NOTE — PROGRESS NOTES
"[facility] Progress Note    HPI: 55 y.o. female underwent Lap appendectomy on 11/23. Presented back on 11/29 with fever and abdominal pain found to have abscess in RLQ. On 11/30 drain was placed. She was observed in the hospital for a week and discharged on oral antibiotics. Sinogram on 12/14 with persistent cavity no communication with bowel noted.        S: She is now done with her antibiotics since Sunday. She denies fevers. She is eating and moving bowels. She admits to pain around her tube site more at the skin level.     O:   Vitals:  /86 (BP Location: Left arm, Patient Position: Chair, Cuff Size: Adult Regular)   Pulse 91   Temp 97.4  F (36.3  C) (Tympanic)   Ht 1.6 m (5' 3\")   Wt 108 kg (238 lb)   SpO2 96%   BMI 42.16 kg/m        Physical Exam:  G: alert oriented, no acute distress   ENT: sclera non-icteric   Pulm: no respiratory distress   CVS: RRR  ABD: No erythema, RLQ drain with dark red drainage. Packing in low midline incision with purulent exudate.   Ext: WWP     Assessment/Plan:  54 yo. Female with complications from a laparoscopic appendectomy, source currently controlled with drain. Concern there is a fecalith that may be in the abdominal cavity. Will continue with weekly sinograms to see about any communication with bowel. Will likely need laparoscopic exploration to evaluate but would like to wait until she is a month out from her drain placement to reduce the inflammation. Discussed plan with patient.     Albert Fernandez MD     "

## 2020-12-19 ENCOUNTER — HOSPITAL ENCOUNTER (EMERGENCY)
Facility: HOSPITAL | Age: 55
Discharge: HOME | End: 2020-12-19
Admitting: NURSE PRACTITIONER
Payer: COMMERCIAL

## 2020-12-19 VITALS
SYSTOLIC BLOOD PRESSURE: 133 MMHG | OXYGEN SATURATION: 96 % | TEMPERATURE: 97.7 F | DIASTOLIC BLOOD PRESSURE: 87 MMHG | RESPIRATION RATE: 18 BRPM | HEART RATE: 89 BPM

## 2020-12-19 PROCEDURE — G0463 HOSPITAL OUTPT CLINIC VISIT: HCPCS

## 2020-12-19 NOTE — ED NOTES
Dressing change to lower mid abdominal incision site consisted of removing the old dressing that had bright red drainage on it. Incision site washed with baby shampoo on a wash cloth then rinsed with warm water. Approximately 6 inches of iodoform gauze replaced to incision site and covered with a 4X 4 folded into quarters and covered the incision site  and bandaged. Pt tolerated well.

## 2020-12-19 NOTE — ED TRIAGE NOTES
Pt presents with needing a dressing change to her lower abdominal area that she  has from appendectomy surgery.

## 2020-12-19 NOTE — ED AVS SNAPSHOT
[facility]  [address]  [place] [address]  Phone: [phone]                                    Rosalie Stewart   MRN: [id number]    Department: [facility]   Date of Visit: 12/19/2020           After Visit Summary Signature Page    I have received my discharge instructions, and my questions have been answered. I have discussed any challenges I see with this plan with the nurse or doctor.    ..........................................................................................................................................  Patient/Patient Representative Signature      ..........................................................................................................................................  Patient Representative Print Name and Relationship to Patient    ..................................................               ................................................  Date                                   Time    ..........................................................................................................................................  Reviewed by Signature/Title    ...................................................              ..............................................  Date                                               Time          22EPIC Rev 08/18

## 2020-12-20 ENCOUNTER — HOSPITAL ENCOUNTER (EMERGENCY)
Facility: HOSPITAL | Age: 55
Discharge: HOME | End: 2020-12-20
Admitting: NURSE PRACTITIONER
Payer: COMMERCIAL

## 2020-12-20 VITALS
OXYGEN SATURATION: 95 % | SYSTOLIC BLOOD PRESSURE: 133 MMHG | HEART RATE: 84 BPM | DIASTOLIC BLOOD PRESSURE: 85 MMHG | TEMPERATURE: 97.4 F | RESPIRATION RATE: 18 BRPM

## 2020-12-20 PROCEDURE — G0463 HOSPITAL OUTPT CLINIC VISIT: HCPCS

## 2020-12-20 NOTE — ED NOTES
Abdominal dressing change done by first removing the old dressing and washing the site with baby shampoo and water, rinsing and gently drying the area. Approximately 6 inches of iodoform packing placed inside of the incision site and covered with 4X4 gauze folded in quarters and taped down Cristian Layton drainage tubing site cleansed also and dried and covered with fresh 4x4 dressing placed and taped down and tape replaced along the tubing to anchor it down and hold in place.

## 2020-12-20 NOTE — ED AVS SNAPSHOT
[facility]  [address]  [place] [address]  Phone: [phone]                                    Rosalie Stewart   MRN: [id number]    Department: [facility]   Date of Visit: 12/20/2020           After Visit Summary Signature Page    I have received my discharge instructions, and my questions have been answered. I have discussed any challenges I see with this plan with the nurse or doctor.    ..........................................................................................................................................  Patient/Patient Representative Signature      ..........................................................................................................................................  Patient Representative Print Name and Relationship to Patient    ..................................................               ................................................  Date                                   Time    ..........................................................................................................................................  Reviewed by Signature/Title    ...................................................              ..............................................  Date                                               Time          22EPIC Rev 08/18

## 2020-12-21 ENCOUNTER — OFFICE VISIT (OUTPATIENT)
Dept: SURGERY | Facility: OTHER | Age: 55
End: 2020-12-21
Attending: SURGERY
Payer: COMMERCIAL

## 2020-12-21 ENCOUNTER — HOSPITAL ENCOUNTER (OUTPATIENT)
Facility: HOSPITAL | Age: 55
Discharge: HOME | End: 2020-12-21
Attending: RADIOLOGY | Admitting: RADIOLOGY
Payer: COMMERCIAL

## 2020-12-21 ENCOUNTER — HOSPITAL ENCOUNTER (OUTPATIENT)
Dept: INTERVENTIONAL RADIOLOGY/VASCULAR | Facility: HOSPITAL | Age: 55
Discharge: HOME | End: 2020-12-21
Attending: SURGERY | Admitting: RADIOLOGY
Payer: COMMERCIAL

## 2020-12-21 VITALS
WEIGHT: 238 LBS | OXYGEN SATURATION: 95 % | HEART RATE: 88 BPM | BODY MASS INDEX: 42.17 KG/M2 | DIASTOLIC BLOOD PRESSURE: 82 MMHG | SYSTOLIC BLOOD PRESSURE: 128 MMHG | HEIGHT: 63 IN | TEMPERATURE: 97.7 F

## 2020-12-21 DIAGNOSIS — K65.1 RIGHT LOWER QUADRANT ABDOMINAL ABSCESS (H): ICD-10-CM

## 2020-12-21 DIAGNOSIS — Z48.00 ENCOUNTER FOR CHANGE OF DRESSING: Primary | ICD-10-CM

## 2020-12-21 PROCEDURE — G0463 HOSPITAL OUTPT CLINIC VISIT: HCPCS

## 2020-12-21 PROCEDURE — 255N000002 HC RX 255 OP 636: Performed by: RADIOLOGY

## 2020-12-21 PROCEDURE — G0463 HOSPITAL OUTPT CLINIC VISIT: HCPCS | Mod: 25

## 2020-12-21 PROCEDURE — 76080 X-RAY EXAM OF FISTULA: CPT

## 2020-12-21 RX ORDER — OXYCODONE HYDROCHLORIDE 5 MG/1
TABLET ORAL
Status: ON HOLD | COMMUNITY
Start: 2020-12-06 | End: 2021-01-05

## 2020-12-21 RX ORDER — HYDROCODONE BITARTRATE AND ACETAMINOPHEN 5; 325 MG/1; MG/1
TABLET ORAL
COMMUNITY
Start: 2020-12-16 | End: 2020-12-22

## 2020-12-21 RX ORDER — IOPAMIDOL 612 MG/ML
50 INJECTION, SOLUTION INTRAVASCULAR ONCE
Status: COMPLETED | OUTPATIENT
Start: 2020-12-21 | End: 2020-12-21

## 2020-12-21 RX ADMIN — IOPAMIDOL 12 ML: 612 INJECTION, SOLUTION INTRAVENOUS at 09:51

## 2020-12-21 ASSESSMENT — PAIN SCALES - GENERAL: PAINLEVEL: NO PAIN (0)

## 2020-12-21 ASSESSMENT — MIFFLIN-ST. JEOR: SCORE: 1643.69

## 2020-12-21 NOTE — NURSING NOTE
"Patient presented to the clinic for her dressing change to her abdominal wound.  The dressing was removed from the wound and the packing was removed from the wound without difficulty.  The skin surrounding the wound was cleansed with water and baby soap.  The wound was measured and is 1.2 cm deep.  There is no sign of infection.  The wound was then repacked with 1/4 inch iodoform packing.  The wound was covered with a 4x4 folded in quarters and taped into place over the wound.  The patient will return to clinic on 12/22/20 for another dressing change. Milagros Wheeler LPN    Chief Complaint   Patient presents with     RECHECK     dressing change       Initial /82 (BP Location: Left arm, Patient Position: Chair, Cuff Size: Adult Regular)   Pulse 88   Temp 97.7  F (36.5  C) (Tympanic)   Ht 1.6 m (5' 3\")   Wt 108 kg (238 lb)   SpO2 95%   BMI 42.16 kg/m   Estimated body mass index is 42.16 kg/m  as calculated from the following:    Height as of this encounter: 1.6 m (5' 3\").    Weight as of this encounter: 108 kg (238 lb).  Medication Reconciliation: complete  Milagros Wheeler LPN    "

## 2020-12-21 NOTE — PROGRESS NOTES
Patient presented to the clinic for her dressing change to her abdominal wound.  The dressing was removed from the wound and the packing was removed from the wound without difficulty.  The skin surrounding the wound was cleansed with water and baby soap.  The wound was measured and is 1.2 cm deep.  There is no sign of infection.  The wound was then repacked with 1/4 inch iodoform packing.  The wound was covered with a 4x4 folded in quarters and taped into place over the wound.  The patient will return to clinic on 12/22/20 for another dressing change. Milagros Wheeler LPN

## 2020-12-21 NOTE — PATIENT INSTRUCTIONS
Thank you for allowing Dr. Fernandez and our surgical team to participate in your care. Please call our health unit coordinator at [phone] with scheduling questions or the nurse at [phone] with any other questions or concerns.

## 2020-12-22 ENCOUNTER — TELEPHONE (OUTPATIENT)
Dept: SURGERY | Facility: OTHER | Age: 55
End: 2020-12-22

## 2020-12-22 ENCOUNTER — OFFICE VISIT (OUTPATIENT)
Dept: SURGERY | Facility: OTHER | Age: 55
End: 2020-12-22
Attending: FAMILY MEDICINE
Payer: COMMERCIAL

## 2020-12-22 VITALS — TEMPERATURE: 97.2 F

## 2020-12-22 DIAGNOSIS — G89.18 ACUTE POST-OPERATIVE PAIN: Primary | ICD-10-CM

## 2020-12-22 DIAGNOSIS — Z48.00 ENCOUNTER FOR CHANGE OF DRESSING: Primary | ICD-10-CM

## 2020-12-22 PROCEDURE — G0463 HOSPITAL OUTPT CLINIC VISIT: HCPCS

## 2020-12-22 ASSESSMENT — PAIN SCALES - GENERAL
PAINLEVEL: NO PAIN (1)
PAINLEVEL: NO PAIN (1)

## 2020-12-22 NOTE — TELEPHONE ENCOUNTER
"This patient is requesting refill on her pain meds. She is taking the hydroodone twice daily--once before dressing changes and then again at bedtime to help her sleep. She is not taking anything over the counter for pain. Her pain right now is a \"1\" after taking hydrocodone and is primarily at the site of the drain and at the skin around the drain. Her pain varies, but gets up to a \"5.\" She has a follow-up next week and will run out before then.   "

## 2020-12-22 NOTE — PROGRESS NOTES
"Patient here for dressing change. Dr. Fernandez came in to see this patient and has advised to stop packing the wound and to just cover it with a bandaid, to do dressing changes daily at home, and return to see him in clinic next week.   Area around wounds cleansed gently with normal sterile saline. The wound that is lower on abdomen is 1 cm deep. Moderate amount of bloody drainage. No odor noted. Covered with dry gauze and medipore tape as the patient indicated she is very sensitive to bandaids and has a specific type at home that works well for her. She will use these at home. Small amount of thick yellow purulent drainage noted on dressing around drain and on dressing. After cleansing the area around wound, split gauze dressing placed and taped in place with medipore tape.     The patient is requesting refill on her pain meds. She is taking the hydroodone twice daily--once before dressing changes and then again at bedtime to help her sleep. She is not taking anything over the counter for pain. Her pain right now is a \"1\" after taking hydrocodone and is primarily at the site of the drain and at the skin around the drain. Her pain varies, but gets up to a \"5.\" Message sent to Dr. Fernandez.   "

## 2020-12-23 ENCOUNTER — HOSPITAL ENCOUNTER (OUTPATIENT)
Facility: HOSPITAL | Age: 55
End: 2020-12-23
Attending: RADIOLOGY | Admitting: RADIOLOGY
Payer: COMMERCIAL

## 2020-12-23 RX ORDER — HYDROCODONE BITARTRATE AND ACETAMINOPHEN 5; 325 MG/1; MG/1
1 TABLET ORAL EVERY 6 HOURS PRN
Qty: 10 TABLET | Refills: 0 | Status: ON HOLD | OUTPATIENT
Start: 2020-12-23 | End: 2021-01-05

## 2020-12-27 ENCOUNTER — HEALTH MAINTENANCE LETTER (OUTPATIENT)
Age: 55
End: 2020-12-27

## 2020-12-28 ENCOUNTER — HOSPITAL ENCOUNTER (OUTPATIENT)
Dept: INTERVENTIONAL RADIOLOGY/VASCULAR | Facility: HOSPITAL | Age: 55
End: 2020-12-28
Attending: SURGERY
Payer: COMMERCIAL

## 2020-12-28 ENCOUNTER — HOSPITAL ENCOUNTER (OUTPATIENT)
Facility: HOSPITAL | Age: 55
Discharge: HOME | End: 2020-12-28
Attending: RADIOLOGY | Admitting: RADIOLOGY
Payer: COMMERCIAL

## 2020-12-28 DIAGNOSIS — K65.1 RIGHT LOWER QUADRANT ABDOMINAL ABSCESS (H): ICD-10-CM

## 2020-12-28 PROCEDURE — 76080 X-RAY EXAM OF FISTULA: CPT

## 2020-12-28 PROCEDURE — 255N000002 HC RX 255 OP 636: Performed by: RADIOLOGY

## 2020-12-28 RX ORDER — IOPAMIDOL 612 MG/ML
50 INJECTION, SOLUTION INTRAVASCULAR ONCE
Status: COMPLETED | OUTPATIENT
Start: 2020-12-28 | End: 2020-12-28

## 2020-12-28 RX ADMIN — IOPAMIDOL 8 ML: 612 INJECTION, SOLUTION INTRAVENOUS at 09:55

## 2020-12-30 ENCOUNTER — OFFICE VISIT (OUTPATIENT)
Dept: SURGERY | Facility: OTHER | Age: 55
End: 2020-12-30
Attending: SURGERY
Payer: COMMERCIAL

## 2020-12-30 ENCOUNTER — PREP FOR PROCEDURE (OUTPATIENT)
Dept: SURGERY | Facility: OTHER | Age: 55
End: 2020-12-30

## 2020-12-30 VITALS
OXYGEN SATURATION: 95 % | TEMPERATURE: 99.4 F | HEART RATE: 107 BPM | WEIGHT: 214.4 LBS | SYSTOLIC BLOOD PRESSURE: 126 MMHG | BODY MASS INDEX: 37.98 KG/M2 | DIASTOLIC BLOOD PRESSURE: 78 MMHG

## 2020-12-30 DIAGNOSIS — K65.1 INTRA-ABDOMINAL ABSCESS (H): ICD-10-CM

## 2020-12-30 DIAGNOSIS — K56.41: Primary | ICD-10-CM

## 2020-12-30 DIAGNOSIS — Z01.818 PREOP TESTING: ICD-10-CM

## 2020-12-30 DIAGNOSIS — I10 BENIGN ESSENTIAL HYPERTENSION: ICD-10-CM

## 2020-12-30 DIAGNOSIS — K65.1 ABSCESS OF ABDOMINAL CAVITY (H): Primary | ICD-10-CM

## 2020-12-30 LAB
ANION GAP SERPL CALCULATED.3IONS-SCNC: 7 MMOL/L (ref 3–14)
BUN SERPL-MCNC: 12 MG/DL (ref 7–30)
CALCIUM SERPL-MCNC: 9.8 MG/DL (ref 8.5–10.1)
CHLORIDE SERPL-SCNC: 104 MMOL/L (ref 94–109)
CO2 SERPL-SCNC: 31 MMOL/L (ref 20–32)
CREAT SERPL-MCNC: 0.84 MG/DL (ref 0.52–1.04)
GFR SERPL CREATININE-BSD FRML MDRD: 78 ML/MIN/{1.73_M2}
GLUCOSE SERPL-MCNC: 133 MG/DL (ref 70–99)
POTASSIUM SERPL-SCNC: 3.3 MMOL/L (ref 3.4–5.3)
SODIUM SERPL-SCNC: 142 MMOL/L (ref 133–144)

## 2020-12-30 PROCEDURE — 36415 COLL VENOUS BLD VENIPUNCTURE: CPT | Performed by: SURGERY

## 2020-12-30 PROCEDURE — 80048 BASIC METABOLIC PNL TOTAL CA: CPT | Performed by: SURGERY

## 2020-12-30 PROCEDURE — 99213 OFFICE O/P EST LOW 20 MIN: CPT | Mod: 24 | Performed by: SURGERY

## 2020-12-30 PROCEDURE — 93000 ELECTROCARDIOGRAM COMPLETE: CPT | Performed by: INTERNAL MEDICINE

## 2020-12-30 NOTE — PATIENT INSTRUCTIONS
Thank you for allowing our surgical team to participate in your care. Please review the instructions below.   If you have questions, you may contact us at the any of the following numbers:     [facility] Health Unit Coordinator: [phone]  Clinic Surgery Nurse (Kelly/Alessandra/Milagros): [phone]  [facility] Surgery Education Nurse: [phone]      Sinogram cancelled for Monday. Rescheduled for CT scan instead.       You are scheduled for: Exploratory Laparoscopy (possible laparotomy, possible bowel resection) with Dr. Fernandez on 1/5/2021.  Admit Time: Hospital Surgery will call you the day before your procedure by 5pm with your arrival time.   If your surgery is on Monday, expect a call on Friday.   If you are not contacted before 5PM, please call admitting at [phone].   After hours or on weekends, please call [phone] to postpone.   Call the clinic surgery nurse if you become ill within 1-2 weeks of your procedure to reschedule.   This includes fever, chills, sore throat, cough, chest congestion, runny nose, or any other symptom of any other illness.     COVID-19 test is needed 4 days before procedure in the morning. This testing is done in the [facility] (weekdays and weekends) or in the [facility] at the [facility] (weekdays only).  Follow the signage for parking and bring your mobile phone if you have one to call the phone number on the sign outside the testing site for check-in. If you do not have a cell phone, please call the nurse for instructions on checking in.   This has been scheduled for 1/1/2021 at 11:00 at the [facility] site.    Lab and EKG tests needed today.    Please call the Hospital Surgery Education Nurse at [phone] 1 week prior to your procedure and have a medication list ready.   Do not take Aspirin or other NSAIDS (Ibuprofen, Motrin, Aleve, Celebrex, Naproxen, etc), vitamins/supplements 7 days before your surgery  unless you have been otherwise directed.     Shower the night before and the morning of your procedure with Hibiclens/Chlorhexidine soap.  On The Night Before Your Surgery:  1.  Remove your jewelry and leave off until after surgery. Wash your hair and body with your regular soap/shampoo. Use a freshly washed washcloth. Include cleaning inside your belly button. Rinse off soap/shampoo.  2. Wash your body from neck to feet using 1/2 of the 1st Hibiclens (Chlorhexidine) bottle with your bare hands. Do not use the Hibiclens on your face, hair or genital area. Leave the soap on your body for one minute and rinse.  3. Repeat step 2 with the 2nd half of the bottle.  4. Rinse off all soap and dry with a freshly washed towel. Do not use lotions or hair products.  5. Sleep in freshly washed pajamas and freshly washed bedding.  On The Morning of Your Surgery:  1.Wash your hair and body with your regular soap/shampoo. Use another freshly washed washcloth. Rinse off.   2. Wash your body from neck to feet using 1/2 of the 2nd Hibiclens (Chlorhexidine) bottle with your bare hands. Leave the soap on your body for one minute and rinse.  3. Repeat step 2 with the 2nd half of the bottle.  4. Rinse off all the soap and dry with another freshly washed towel. Do not use lotions or hair products.  5. Wear freshly washed clothing to the hospital.      Do not have anything to eat or drink after midnight the night before your surgery (or 8 hours prior to surgery), except clear liquids (water, clear juice, clear broth, plain coffee or tea without cream or milk) up until 2 hours prior to arrival time.   Nothing by mouth after the 2 hours prior to arrival.  Do not chew gum, suck on hard candy, or smoke. You can brush your teeth.   If you are directed to take any medications, take them with a tiny sip of water.   Arrive in clean, comfortable clothing.   Do not wear any jewelry, make-up, nail polish, lotions, hair products, or perfumes.    [place] in hospital admitting through the [facility].  A responsible adult must be available to drive you home and stay with you for 24 hours at home. If you need to take a taxi or the bus, you must have another responsible adult to ride with you. Your procedure will be cancelled if you do not have a responsible adult .     Return to clinic for a postop appointment 2 week(s) from your surgery date.

## 2020-12-30 NOTE — NURSING NOTE
"Chief Complaint   Patient presents with     RECHECK     discuss sinogram results 12/28       Initial /78 (BP Location: Right arm, Patient Position: Sitting, Cuff Size: Adult Large)   Pulse 107   Temp 99.4  F (37.4  C) (Tympanic)   Wt 97.3 kg (214 lb 6.4 oz)   SpO2 95%   BMI 37.98 kg/m   Estimated body mass index is 37.98 kg/m  as calculated from the following:    Height as of 12/21/20: 1.6 m (5' 3\").    Weight as of this encounter: 97.3 kg (214 lb 6.4 oz).  Medication Reconciliation: complete  Sugey Durán LPN    "

## 2021-01-01 ENCOUNTER — OFFICE VISIT (OUTPATIENT)
Dept: FAMILY MEDICINE | Facility: OTHER | Age: 56
End: 2021-01-01
Attending: FAMILY MEDICINE
Payer: COMMERCIAL

## 2021-01-01 DIAGNOSIS — Z01.818 PREOP TESTING: ICD-10-CM

## 2021-01-01 DIAGNOSIS — K65.1 ABSCESS OF ABDOMINAL CAVITY (H): ICD-10-CM

## 2021-01-01 LAB
SARS-COV-2 RNA SPEC QL NAA+PROBE: NORMAL
SPECIMEN SOURCE: NORMAL

## 2021-01-01 PROCEDURE — 87635 SARS-COV-2 COVID-19 AMP PRB: CPT | Performed by: SURGERY

## 2021-01-01 PROCEDURE — 87635 SARS-COV-2 COVID-19 AMP PRB: CPT

## 2021-01-02 LAB
LABORATORY COMMENT REPORT: NORMAL
SARS-COV-2 RNA SPEC QL NAA+PROBE: NEGATIVE
SPECIMEN SOURCE: NORMAL

## 2021-01-04 ENCOUNTER — HOSPITAL ENCOUNTER (OUTPATIENT)
Dept: CT IMAGING | Facility: HOSPITAL | Age: 56
Discharge: HOME | End: 2021-01-04
Attending: SURGERY | Admitting: SURGERY
Payer: COMMERCIAL

## 2021-01-04 ENCOUNTER — ANESTHESIA EVENT (OUTPATIENT)
Dept: SURGERY | Facility: HOSPITAL | Age: 56
End: 2021-01-04
Payer: COMMERCIAL

## 2021-01-04 DIAGNOSIS — K65.1 ABSCESS OF ABDOMINAL CAVITY (H): ICD-10-CM

## 2021-01-04 PROCEDURE — 74177 CT ABD & PELVIS W/CONTRAST: CPT

## 2021-01-04 PROCEDURE — 255N000002 HC RX 255 OP 636: Performed by: RADIOLOGY

## 2021-01-04 RX ORDER — IOPAMIDOL 612 MG/ML
100 INJECTION, SOLUTION INTRAVASCULAR ONCE
Status: COMPLETED | OUTPATIENT
Start: 2021-01-04 | End: 2021-01-04

## 2021-01-04 RX ADMIN — IOPAMIDOL 100 ML: 612 INJECTION, SOLUTION INTRAVENOUS at 11:07

## 2021-01-04 NOTE — PROGRESS NOTES
[facility] Progress Note    HPI:55 y.o. female underwent Lap appendectomy on 11/23. Presented back on 11/29 with fever and abdominal pain found to have abscess in RLQ. On 11/30 drain was placed. She was observed in the hospital for a week and discharged on oral antibiotics. Sinogram on 12/14 with persistent cavity no communication with bowel noted.  Repeat of sinogram stable.       S: She is doing well, eating moving bowels, has some pain at insertion site otherwise stable.     O:   Vitals:  /78 (BP Location: Right arm, Patient Position: Sitting, Cuff Size: Adult Large)   Pulse 107   Temp 99.4  F (37.4  C) (Tympanic)   Wt 97.3 kg (214 lb 6.4 oz)   SpO2 95%   BMI 37.98 kg/m        Physical Exam:  G: alert oriented, no acute distress   ENT: sclera non-icteric   Pulm: no respiratory distress   CVS: RRR  ABD: there is dark red drainage in catheter   Ext: WWP     Assessment/Plan:  Concern there is fecalith residing within the abscess cavity, which would result in return of abscess with removal of drain. There appears to be no communication with bowel currently. She is now aprox 5 weeks out from her drain placement. I discussed that I would like to take her for laparoscopic exploration to see if able to retreive this observe the prior staple lines and leave a surgical drain that should clear the remainder of infection. I discussed that there is a chance that this could result in a larger operation if there is concern for an uncontrolled situation with the bowel. She is otherwise healthy will update her H &P day of. I would also like a CT scan prior to her surgery date to localize the fecalith.     Albert Fernandez MD

## 2021-01-04 NOTE — ANESTHESIA PREPROCEDURE EVALUATION
Anesthesia Pre-Procedure Evaluation    Patient: Rosalie Stewart   MRN: [id number] : 1965          Preoperative Diagnosis: Fecalith (H) [K56.41]  Intra-abdominal abscess (H) [K65.1]    Procedure(s):  exploratory laparoscopy, possible exploratory laparotomy, possible bowel resection    Past Medical History:   Diagnosis Date     Anxiety      Arthralgia     negative lyme, VINAY, RF, TSH, 2017     Arthritis      Benign essential hypertension      Postmenopausal bleeding     ? U/S & Endometrial biopsy done at [facility]?     Past Surgical History:   Procedure Laterality Date      tubal ligation Bilateral           SECTION      2     COLONOSCOPY      negative     ENDOSCOPY      negative     IR SINOGRAM INJECTION DIAGNOSTIC  2020     IR SINOGRAM INJECTION DIAGNOSTIC  2020     IR SINOGRAM INJECTION DIAGNOSTIC  2020     LAPAROSCOPIC APPENDECTOMY N/A 2020    Procedure: APPENDECTOMY, LAPAROSCOPIC;  Surgeon: Keegan Burton DO;  Location: [facility] OR       Anesthesia Evaluation     . Pt has had prior anesthetic. Type: General    No history of anesthetic complications          ROS/MED HX    ENT/Pulmonary:     (+)ZUNILDA risk factors hypertension, obese, , . .    Neurologic:  - neg neurologic ROS     Cardiovascular:     (+) Dyslipidemia, hypertension----. : . . . :. .       METS/Exercise Tolerance:  >4 METS   Hematologic:  - neg hematologic  ROS       Musculoskeletal: Comment: arthralgia  (+) arthritis,  -       GI/Hepatic: Comment: Abdominal pain post appy        Renal/Genitourinary:  - ROS Renal section negative       Endo:     (+) Obesity, .      Psychiatric:     (+) psychiatric history anxiety      Infectious Disease: Comment: Sepsis post appy - neg infectious disease ROS       Malignancy:      - no malignancy   Other:    (+) No chance of pregnancy C-spine cleared: N/A, no H/O Chronic Pain,no other significant disability                         Physical Exam  Normal  "systems: cardiovascular, pulmonary and dental    Airway   Mallampati: II  TM distance: >3 FB  Neck ROM: full    Dental     Cardiovascular   Rhythm and rate: regular and normal      Pulmonary    breath sounds clear to auscultation            Lab Results   Component Value Date    WBC 11.1 (H) 12/06/2020    HGB 10.9 (L) 12/06/2020    HCT 33.2 (L) 12/06/2020     (H) 12/06/2020    CRP 4.2 11/18/2019     12/30/2020    POTASSIUM 3.3 (L) 12/30/2020    CHLORIDE 104 12/30/2020    CO2 31 12/30/2020    BUN 12 12/30/2020    CR 0.84 12/30/2020     (H) 12/30/2020    ANGELA 9.8 12/30/2020    ALBUMIN 2.8 (L) 11/29/2020    PROTTOTAL 8.1 11/29/2020    ALT 28 11/29/2020    AST 28 11/29/2020    ALKPHOS 79 11/29/2020    BILITOTAL 0.4 11/29/2020    LIPASE 55 (L) 11/29/2020    PTT 33 11/29/2020    INR 1.06 11/29/2020    TSH 2.14 01/17/2018    HCG Negative 11/22/2020       Preop Vitals  BP Readings from Last 3 Encounters:   12/30/20 126/78   12/21/20 128/82   12/20/20 133/85    Pulse Readings from Last 3 Encounters:   12/30/20 107   12/21/20 88   12/20/20 84      Resp Readings from Last 3 Encounters:   12/20/20 18   12/19/20 18   12/13/20 16    SpO2 Readings from Last 3 Encounters:   12/30/20 95%   12/21/20 95%   12/20/20 95%      Temp Readings from Last 1 Encounters:   12/30/20 99.4  F (37.4  C) (Tympanic)    Ht Readings from Last 1 Encounters:   12/21/20 1.6 m (5' 3\")      Wt Readings from Last 1 Encounters:   12/30/20 97.3 kg (214 lb 6.4 oz)    Estimated body mass index is 37.98 kg/m  as calculated from the following:    Height as of 12/21/20: 1.6 m (5' 3\").    Weight as of 12/30/20: 97.3 kg (214 lb 6.4 oz).       Anesthesia Plan      History & Physical Review  History and physical reviewed and following examination; no interval change.    ASA Status:  3 .    NPO Status:  > 8 hours    Plan for General with Intravenous and Propofol induction. Maintenance will be Balanced.    PONV prophylaxis:  Ondansetron (or other 5HT-3) " and Dexamethasone or Solumedrol         Postoperative Care  Postoperative pain management:  IV analgesics and Peripheral nerve block (Single Shot).      Consents  Anesthetic plan, risks, benefits and alternatives discussed with:  Patient..                 MIRZA Moore CRNA

## 2021-01-05 ENCOUNTER — ANESTHESIA (OUTPATIENT)
Dept: SURGERY | Facility: HOSPITAL | Age: 56
End: 2021-01-05
Payer: COMMERCIAL

## 2021-01-05 ENCOUNTER — HOSPITAL ENCOUNTER (OUTPATIENT)
Facility: HOSPITAL | Age: 56
Discharge: HOME | End: 2021-01-05
Attending: SURGERY | Admitting: SURGERY
Payer: COMMERCIAL

## 2021-01-05 VITALS
RESPIRATION RATE: 20 BRPM | TEMPERATURE: 98 F | DIASTOLIC BLOOD PRESSURE: 80 MMHG | SYSTOLIC BLOOD PRESSURE: 130 MMHG | WEIGHT: 214 LBS | HEART RATE: 97 BPM | HEIGHT: 63 IN | OXYGEN SATURATION: 97 % | BODY MASS INDEX: 37.92 KG/M2

## 2021-01-05 DIAGNOSIS — K65.1 INTRA-ABDOMINAL ABSCESS (H): ICD-10-CM

## 2021-01-05 DIAGNOSIS — G89.18 ACUTE POSTOPERATIVE PAIN: Primary | ICD-10-CM

## 2021-01-05 DIAGNOSIS — K56.41: ICD-10-CM

## 2021-01-05 PROCEDURE — 710N000010 HC RECOVERY PHASE 1, LEVEL 2, PER MIN: Performed by: SURGERY

## 2021-01-05 PROCEDURE — 250N000011 HC RX IP 250 OP 636: Performed by: NURSE ANESTHETIST, CERTIFIED REGISTERED

## 2021-01-05 PROCEDURE — 258N000003 HC RX IP 258 OP 636: Performed by: NURSE ANESTHETIST, CERTIFIED REGISTERED

## 2021-01-05 PROCEDURE — 250N000025 HC SEVOFLURANE, PER MIN

## 2021-01-05 PROCEDURE — 49322 LAPAROSCOPY ASPIRATION: CPT | Mod: AS | Performed by: CLINICAL NURSE SPECIALIST

## 2021-01-05 PROCEDURE — 49322 LAPAROSCOPY ASPIRATION: CPT | Mod: 78 | Performed by: SURGERY

## 2021-01-05 PROCEDURE — 370N000017 HC ANESTHESIA TECHNICAL FEE, PER MIN: Performed by: SURGERY

## 2021-01-05 PROCEDURE — 250N000011 HC RX IP 250 OP 636: Performed by: SURGERY

## 2021-01-05 PROCEDURE — 271N000001 HC OR GENERAL SUPPLY NON-STERILE: Performed by: SURGERY

## 2021-01-05 PROCEDURE — 710N000012 HC RECOVERY PHASE 2, PER MINUTE: Performed by: SURGERY

## 2021-01-05 PROCEDURE — 250N000009 HC RX 250: Performed by: NURSE ANESTHETIST, CERTIFIED REGISTERED

## 2021-01-05 PROCEDURE — 999N000138 HC STATISTIC PRE-PROCEDURE ASSESSMENT I: Performed by: SURGERY

## 2021-01-05 PROCEDURE — 250N000009 HC RX 250: Performed by: SURGERY

## 2021-01-05 PROCEDURE — 360N000076 HC SURGERY LEVEL 3, PER MIN: Performed by: SURGERY

## 2021-01-05 PROCEDURE — 49320 DIAG LAPARO SEPARATE PROC: CPT | Performed by: NURSE ANESTHETIST, CERTIFIED REGISTERED

## 2021-01-05 PROCEDURE — 272N000001 HC OR GENERAL SUPPLY STERILE: Performed by: SURGERY

## 2021-01-05 PROCEDURE — 250N000013 HC RX MED GY IP 250 OP 250 PS 637: Performed by: SURGERY

## 2021-01-05 RX ORDER — GLYCOPYRROLATE 0.2 MG/ML
INJECTION, SOLUTION INTRAMUSCULAR; INTRAVENOUS PRN
Status: DISCONTINUED | OUTPATIENT
Start: 2021-01-05 | End: 2021-01-05

## 2021-01-05 RX ORDER — FENTANYL CITRATE 50 UG/ML
INJECTION, SOLUTION INTRAMUSCULAR; INTRAVENOUS PRN
Status: DISCONTINUED | OUTPATIENT
Start: 2021-01-05 | End: 2021-01-05

## 2021-01-05 RX ORDER — SODIUM CHLORIDE, SODIUM LACTATE, POTASSIUM CHLORIDE, CALCIUM CHLORIDE 600; 310; 30; 20 MG/100ML; MG/100ML; MG/100ML; MG/100ML
INJECTION, SOLUTION INTRAVENOUS CONTINUOUS
Status: DISCONTINUED | OUTPATIENT
Start: 2021-01-05 | End: 2021-01-05 | Stop reason: HOSPADM

## 2021-01-05 RX ORDER — NALOXONE HYDROCHLORIDE 0.4 MG/ML
0.2 INJECTION, SOLUTION INTRAMUSCULAR; INTRAVENOUS; SUBCUTANEOUS
Status: DISCONTINUED | OUTPATIENT
Start: 2021-01-05 | End: 2021-01-05 | Stop reason: HOSPADM

## 2021-01-05 RX ORDER — ONDANSETRON 2 MG/ML
INJECTION INTRAMUSCULAR; INTRAVENOUS PRN
Status: DISCONTINUED | OUTPATIENT
Start: 2021-01-05 | End: 2021-01-05

## 2021-01-05 RX ORDER — NALOXONE HYDROCHLORIDE 0.4 MG/ML
0.2 INJECTION, SOLUTION INTRAMUSCULAR; INTRAVENOUS; SUBCUTANEOUS
Status: DISCONTINUED | OUTPATIENT
Start: 2021-01-05 | End: 2021-01-05

## 2021-01-05 RX ORDER — LIDOCAINE HYDROCHLORIDE 20 MG/ML
INJECTION, SOLUTION INFILTRATION; PERINEURAL PRN
Status: DISCONTINUED | OUTPATIENT
Start: 2021-01-05 | End: 2021-01-05

## 2021-01-05 RX ORDER — HYDROCODONE BITARTRATE AND ACETAMINOPHEN 5; 325 MG/1; MG/1
1-2 TABLET ORAL EVERY 4 HOURS PRN
Qty: 25 TABLET | Refills: 0 | Status: SHIPPED | OUTPATIENT
Start: 2021-01-05 | End: 2021-02-19

## 2021-01-05 RX ORDER — ONDANSETRON 4 MG/1
4 TABLET, ORALLY DISINTEGRATING ORAL EVERY 30 MIN PRN
Status: DISCONTINUED | OUTPATIENT
Start: 2021-01-05 | End: 2021-01-05 | Stop reason: HOSPADM

## 2021-01-05 RX ORDER — NALOXONE HYDROCHLORIDE 0.4 MG/ML
0.4 INJECTION, SOLUTION INTRAMUSCULAR; INTRAVENOUS; SUBCUTANEOUS
Status: DISCONTINUED | OUTPATIENT
Start: 2021-01-05 | End: 2021-01-05

## 2021-01-05 RX ORDER — IBUPROFEN 600 MG/1
600 TABLET, FILM COATED ORAL EVERY 6 HOURS PRN
Qty: 30 TABLET | Refills: 0 | Status: SHIPPED | OUTPATIENT
Start: 2021-01-05 | End: 2021-02-19

## 2021-01-05 RX ORDER — FENTANYL CITRATE 50 UG/ML
25-50 INJECTION, SOLUTION INTRAMUSCULAR; INTRAVENOUS EVERY 5 MIN PRN
Status: DISCONTINUED | OUTPATIENT
Start: 2021-01-05 | End: 2021-01-05 | Stop reason: HOSPADM

## 2021-01-05 RX ORDER — AMOXICILLIN 250 MG
1-2 CAPSULE ORAL 2 TIMES DAILY
Qty: 30 TABLET | Refills: 0 | Status: SHIPPED | OUTPATIENT
Start: 2021-01-05 | End: 2021-02-19

## 2021-01-05 RX ORDER — CEFAZOLIN SODIUM 2 G/100ML
2 INJECTION, SOLUTION INTRAVENOUS
Status: COMPLETED | OUTPATIENT
Start: 2021-01-05 | End: 2021-01-05

## 2021-01-05 RX ORDER — LIDOCAINE 40 MG/G
CREAM TOPICAL
Status: DISCONTINUED | OUTPATIENT
Start: 2021-01-05 | End: 2021-01-05 | Stop reason: HOSPADM

## 2021-01-05 RX ORDER — KETAMINE HYDROCHLORIDE 10 MG/ML
INJECTION INTRAMUSCULAR; INTRAVENOUS PRN
Status: DISCONTINUED | OUTPATIENT
Start: 2021-01-05 | End: 2021-01-05

## 2021-01-05 RX ORDER — CEFAZOLIN SODIUM 1 G/3ML
1 INJECTION, POWDER, FOR SOLUTION INTRAMUSCULAR; INTRAVENOUS SEE ADMIN INSTRUCTIONS
Status: DISCONTINUED | OUTPATIENT
Start: 2021-01-05 | End: 2021-01-05 | Stop reason: HOSPADM

## 2021-01-05 RX ORDER — NALOXONE HYDROCHLORIDE 0.4 MG/ML
0.4 INJECTION, SOLUTION INTRAMUSCULAR; INTRAVENOUS; SUBCUTANEOUS
Status: DISCONTINUED | OUTPATIENT
Start: 2021-01-05 | End: 2021-01-05 | Stop reason: HOSPADM

## 2021-01-05 RX ORDER — ONDANSETRON 2 MG/ML
4 INJECTION INTRAMUSCULAR; INTRAVENOUS EVERY 30 MIN PRN
Status: DISCONTINUED | OUTPATIENT
Start: 2021-01-05 | End: 2021-01-05 | Stop reason: HOSPADM

## 2021-01-05 RX ORDER — HYDROMORPHONE HYDROCHLORIDE 1 MG/ML
.3-.5 INJECTION, SOLUTION INTRAMUSCULAR; INTRAVENOUS; SUBCUTANEOUS EVERY 5 MIN PRN
Status: DISCONTINUED | OUTPATIENT
Start: 2021-01-05 | End: 2021-01-05 | Stop reason: HOSPADM

## 2021-01-05 RX ORDER — HYDROCODONE BITARTRATE AND ACETAMINOPHEN 5; 325 MG/1; MG/1
1 TABLET ORAL EVERY 6 HOURS PRN
Status: DISCONTINUED | OUTPATIENT
Start: 2021-01-05 | End: 2021-01-05 | Stop reason: HOSPADM

## 2021-01-05 RX ORDER — FENTANYL CITRATE 50 UG/ML
25-50 INJECTION, SOLUTION INTRAMUSCULAR; INTRAVENOUS
Status: DISCONTINUED | OUTPATIENT
Start: 2021-01-05 | End: 2021-01-05 | Stop reason: HOSPADM

## 2021-01-05 RX ORDER — DEXAMETHASONE SODIUM PHOSPHATE 10 MG/ML
INJECTION, SOLUTION INTRAMUSCULAR; INTRAVENOUS PRN
Status: DISCONTINUED | OUTPATIENT
Start: 2021-01-05 | End: 2021-01-05

## 2021-01-05 RX ORDER — PROPOFOL 10 MG/ML
INJECTION, EMULSION INTRAVENOUS PRN
Status: DISCONTINUED | OUTPATIENT
Start: 2021-01-05 | End: 2021-01-05

## 2021-01-05 RX ORDER — MEPERIDINE HYDROCHLORIDE 25 MG/ML
12.5 INJECTION INTRAMUSCULAR; INTRAVENOUS; SUBCUTANEOUS EVERY 5 MIN PRN
Status: DISCONTINUED | OUTPATIENT
Start: 2021-01-05 | End: 2021-01-05 | Stop reason: HOSPADM

## 2021-01-05 RX ADMIN — MIDAZOLAM 2 MG: 1 INJECTION INTRAMUSCULAR; INTRAVENOUS at 08:00

## 2021-01-05 RX ADMIN — SUGAMMADEX 400 MG: 100 INJECTION, SOLUTION INTRAVENOUS at 09:54

## 2021-01-05 RX ADMIN — KETAMINE HYDROCHLORIDE 30 MG: 10 INJECTION, SOLUTION INTRAMUSCULAR; INTRAVENOUS at 08:06

## 2021-01-05 RX ADMIN — HYDROMORPHONE HYDROCHLORIDE 0.5 MG: 1 INJECTION, SOLUTION INTRAMUSCULAR; INTRAVENOUS; SUBCUTANEOUS at 10:52

## 2021-01-05 RX ADMIN — FENTANYL CITRATE 50 MCG: 50 INJECTION, SOLUTION INTRAMUSCULAR; INTRAVENOUS at 08:42

## 2021-01-05 RX ADMIN — FENTANYL CITRATE 50 MCG: 50 INJECTION INTRAMUSCULAR; INTRAVENOUS at 13:04

## 2021-01-05 RX ADMIN — LIDOCAINE HYDROCHLORIDE 40 MG: 20 INJECTION, SOLUTION INFILTRATION; PERINEURAL at 08:06

## 2021-01-05 RX ADMIN — FENTANYL CITRATE 25 MCG: 50 INJECTION, SOLUTION INTRAMUSCULAR; INTRAVENOUS at 09:58

## 2021-01-05 RX ADMIN — CEFAZOLIN SODIUM 2 G: 2 INJECTION, SOLUTION INTRAVENOUS at 08:10

## 2021-01-05 RX ADMIN — PROPOFOL 150 MG: 10 INJECTION, EMULSION INTRAVENOUS at 08:06

## 2021-01-05 RX ADMIN — HYDROCODONE BITARTRATE AND ACETAMINOPHEN 1 TABLET: 5; 325 TABLET ORAL at 12:08

## 2021-01-05 RX ADMIN — GLYCOPYRROLATE 0.1 MG: 0.2 INJECTION, SOLUTION INTRAMUSCULAR; INTRAVENOUS at 08:36

## 2021-01-05 RX ADMIN — ROCURONIUM BROMIDE 20 MG: 10 INJECTION INTRAVENOUS at 09:25

## 2021-01-05 RX ADMIN — SODIUM CHLORIDE, POTASSIUM CHLORIDE, SODIUM LACTATE AND CALCIUM CHLORIDE 1000 ML: 600; 310; 30; 20 INJECTION, SOLUTION INTRAVENOUS at 07:52

## 2021-01-05 RX ADMIN — DEXAMETHASONE SODIUM PHOSPHATE 10 MG: 10 INJECTION, SOLUTION INTRAMUSCULAR; INTRAVENOUS at 08:16

## 2021-01-05 RX ADMIN — FENTANYL CITRATE 50 MCG: 50 INJECTION, SOLUTION INTRAMUSCULAR; INTRAVENOUS at 09:45

## 2021-01-05 RX ADMIN — ROCURONIUM BROMIDE 20 MG: 10 INJECTION INTRAVENOUS at 09:05

## 2021-01-05 RX ADMIN — FENTANYL CITRATE 50 MCG: 50 INJECTION, SOLUTION INTRAMUSCULAR; INTRAVENOUS at 08:19

## 2021-01-05 RX ADMIN — HYDROMORPHONE HYDROCHLORIDE 0.5 MG: 1 INJECTION, SOLUTION INTRAMUSCULAR; INTRAVENOUS; SUBCUTANEOUS at 11:04

## 2021-01-05 RX ADMIN — FENTANYL CITRATE 50 MCG: 50 INJECTION INTRAMUSCULAR; INTRAVENOUS at 10:44

## 2021-01-05 RX ADMIN — ROCURONIUM BROMIDE 20 MG: 10 INJECTION INTRAVENOUS at 08:15

## 2021-01-05 RX ADMIN — KETAMINE HYDROCHLORIDE 10 MG: 10 INJECTION, SOLUTION INTRAMUSCULAR; INTRAVENOUS at 09:26

## 2021-01-05 RX ADMIN — ONDANSETRON 4 MG: 2 INJECTION INTRAMUSCULAR; INTRAVENOUS at 08:16

## 2021-01-05 RX ADMIN — FENTANYL CITRATE 50 MCG: 50 INJECTION INTRAMUSCULAR; INTRAVENOUS at 10:23

## 2021-01-05 RX ADMIN — FENTANYL CITRATE 25 MCG: 50 INJECTION, SOLUTION INTRAMUSCULAR; INTRAVENOUS at 10:02

## 2021-01-05 RX ADMIN — ROCURONIUM BROMIDE 30 MG: 10 INJECTION INTRAVENOUS at 08:06

## 2021-01-05 RX ADMIN — SODIUM CHLORIDE, POTASSIUM CHLORIDE, SODIUM LACTATE AND CALCIUM CHLORIDE: 600; 310; 30; 20 INJECTION, SOLUTION INTRAVENOUS at 09:35

## 2021-01-05 ASSESSMENT — MIFFLIN-ST. JEOR: SCORE: 1534.83

## 2021-01-05 NOTE — ANESTHESIA CARE TRANSFER NOTE
Patient: Rosalie Stewart    Procedure(s):  exploratory laparoscopy, possible exploratory laparotomy, possible bowel resection    Diagnosis: Fecalith (H) [K56.41]  Intra-abdominal abscess (H) [K65.1]  Diagnosis Additional Information: No value filed.    Anesthesia Type:   General     Note:  Airway :Nasal Cannula  Patient transferred to:PACU  Handoff Report: Identifed the Patient, Identified the Reponsible Provider, Reviewed the pertinent medical history, Discussed the surgical course, Reviewed Intra-OP anesthesia mangement and issues during anesthesia, Set expectations for post-procedure period and Allowed opportunity for questions and acknowledgement of understanding      Vitals: (Last set prior to Anesthesia Care Transfer)    CRNA VITALS  1/5/2021 0938 - 1/5/2021 1011      1/5/2021             Resp Rate (observed):  18                Electronically Signed By: IMRZA Billingsley CRNA  January 5, 2021  10:11 AM

## 2021-01-05 NOTE — OR NURSING
Pt has met discharge criteria for PACU, waiting for available staff in [facility].  Pt placed in Phase 2.

## 2021-01-05 NOTE — BRIEF OP NOTE
[facility]    Brief Operative Note    Pre-operative diagnosis: Fecalith (H) [K56.41]  Intra-abdominal abscess (H) [K65.1]  Post-operative diagnosis retained fecalith within abscess cavity     Procedure: Procedure(s):  exploratory laparoscopy, possible exploratory laparotomy, possible bowel resection  Surgeon: Surgeon(s) and Role:     * Albert Fernandez MD - Primary     * Meli Hook APRN CNS - Assisting  Anesthesia: General   Estimated blood loss: Less than 50 ml  Drains: Cristian-Layton  Specimens: * No specimens in log *  Findings:   There was an abscess cavity with a fecalith, within this was crushed and sucked up through suction. Abdomen was irrigated out and large drain left in RLQ.   Complications: None.  Implants: * No implants in log *

## 2021-01-05 NOTE — ANESTHESIA POSTPROCEDURE EVALUATION
Patient: Rosalie Stewart    Procedure(s):  exploratory laparoscopy with lysis of adhesions, drainage of abscess    Diagnosis:Fecalith (H) [K56.41]  Intra-abdominal abscess (H) [K65.1]  Diagnosis Additional Information: No value filed.    Anesthesia Type:  General    Note:  Anesthesia Post Evaluation    Patient location during evaluation: Bedside and Phase 2  Patient participation: Able to fully participate in evaluation  Level of consciousness: awake  Pain management: adequate  Airway patency: patent  Cardiovascular status: acceptable  Respiratory status: acceptable  Hydration status: acceptable  PONV: none     Anesthetic complications: None          Last vitals:  Vitals:    01/05/21 1115 01/05/21 1145 01/05/21 1200   BP: 136/78 124/75 121/87   Pulse: 98     Resp: 13 18 18   Temp:      SpO2: (!) 88%           Electronically Signed By: MIRZA Billingsley CRNA  January 5, 2021  12:13 PM

## 2021-01-05 NOTE — H&P
Surgery Consult Clinic Note      RE: Rosalie Stewart  : 1965        Chief Complaint:  Abdominal abscess, possible fecalith    History of Present Illness:  Rosalie underwent a laparoscopic appendectomy on 2020.  She presented back to the ED on 2020 with fever and abdominal pain.  She was found to have an abscess in the RLQ.  A drain was placed on 2020 and she was observed in the hospital for one week, then discharged home on oral antibiotics.  She had a sinogram on 2020 with a persistent cavity, no communication with the bowel was noted.    She is here today to update her H&P.  Rosalie is scheduled for exploratory laproscopy on 2021, which is outside the 30 day anesthesia clearance guidelines.    She specifically denies fevers, chills, nausea, vomiting, chest pain, shortness of breath, palpitations, sore throat, cough, or generalized feeling ill.      Medical history:  Past Medical History:   Diagnosis Date     Anxiety      Arthralgia     negative lyme, VINAY, RF, TSH, 2017     Arthritis      Benign essential hypertension      Postmenopausal bleeding     ? U/S & Endometrial biopsy done at [facility]?       Surgical history:  Past Surgical History:   Procedure Laterality Date      tubal ligation Bilateral           SECTION      2     COLONOSCOPY      negative     ENDOSCOPY      negative     IR SINOGRAM INJECTION DIAGNOSTIC  2020     IR SINOGRAM INJECTION DIAGNOSTIC  2020     IR SINOGRAM INJECTION DIAGNOSTIC  2020     LAPAROSCOPIC APPENDECTOMY N/A 2020    Procedure: APPENDECTOMY, LAPAROSCOPIC;  Surgeon: Keegan Burton DO;  Location: [facility] OR       Family history:  Family History   Family history unknown: Yes       Medications:  Prior to Admission medications    Medication Sig Start Date End Date Taking? Authorizing Provider   Calcium Citrate-Vitamin D (CALCIUM + D PO) Take 1 tablet by mouth daily    Yes Reported, Patient   fish  oil-omega-3 fatty acids 1000 MG capsule Take 1 capsule by mouth daily   Yes Reported, Patient   HYDROcodone-acetaminophen (NORCO) 5-325 MG tablet Take 1 tablet by mouth every 6 hours as needed for severe pain 12/23/20  Yes Ablert Fernandez MD   lisinopril-hydrochlorothiazide (ZESTORETIC) 20-25 MG tablet TAKE 1 TABLET BY MOUTH DAILY 10/7/20  Yes Anne Solorio MD   multivitamin, therapeutic with minerals (MULTI-VITAMIN) TABS tablet Take 1 tablet by mouth daily   Yes Reported, Patient   simvastatin (ZOCOR) 20 MG tablet TAKE 1 TABLET(20 MG) BY MOUTH AT BEDTIME 8/6/20  Yes Anne Solorio MD   ibuprofen (ADVIL/MOTRIN) 600 MG tablet Take 1 tablet (600 mg) by mouth every 6 hours as needed for moderate pain 12/6/20   Albert Fernandez MD   oxyCODONE (ROXICODONE) 5 MG tablet  12/6/20   Reported, Patient     Allergies:  The patient is allergic to adhesive tape and seasonal allergies.  .  Social history:  Social History     Tobacco Use     Smoking status: Never Smoker     Smokeless tobacco: Never Used   Substance Use Topics     Alcohol use: Yes     Comment: occasionally, 2x/week     Marital status: .      Review of Systems:    Constitutional: Negative for fever, chills and weight loss.   HENT: Negative for ear pain, nosebleeds, congestion, sore throat, tinnitus and ear discharge.    Eyes: Negative for blurred vision, double vision, photophobia and pain.   Respiratory: Negative for cough, hemoptysis, shortness of breath, wheezing and stridor.    Cardiovascular: Negative for chest pain, palpitations and orthopnea.   Gastrointestinal: Negative for heartburn, nausea, vomiting, abdominal pain and blood in stool.   Genitourinary: Negative for urgency, frequency and hematuria.   Musculoskeletal: Negative for myalgias, back pain and joint pain.   Neurological: Negative for tingling, speech change and headaches.   Endo/Heme/Allergies: Does not bruise/bleed easily.   Psychiatric/Behavioral: Negative for depression,  "suicidal ideas and hallucinations. The patient is not nervous/anxious.    Physical Examination:  BP (!) 161/102   Pulse 108   Temp 99.9  F (37.7  C) (Temporal)   Resp 20   Ht 1.6 m (5' 3\")   Wt 97.1 kg (214 lb)   SpO2 95%   BMI 37.91 kg/m      General: Alert and orientedx4, no acute distress, well-developed/well-nourished, ambulating without assistance  HEENT: normocephalic atraumatic, extraocular movements intact, PERRL Sclerae anicteric; Trachea mideline, no JVD  Chest:   Clear to auscultation bilaterally.  Cardiac: S1S2 , regular rate and rhythm without additional sounds  Abdomen: Soft, no distention  Extremities: Cursory exam unremarkable.  No peripheral edema noted.  Skin: Warm, dry, < 2 sec cap refill  Neuro: CN 2-12 grossly intact, no focal deficit, GCS 15  Psych: Pleasant, calm, asks appropriate questions      Assessment/Plan:  Exploratory laparoscopy, possible open    Discussion regarding risks, benefits, and alternatives have been discussed.   Risks include infection, a larger operation if there is concern for an uncontrolled situation with the bowel, bleeding, need for repeat procedures.  All of her questions were asked and answered.  We will proceed with procedure as scheduled.  Meli Hook [facility]  [address] [place] [address]    Referring Provider:  No referring provider defined for this encounter.     Primary Care Provider:  Anne Solorio  "

## 2021-01-05 NOTE — OR NURSING
Pateint discharged to home. in wheelchair. Ron score 19/20 Pain level 2/10.  Discharged from unit via [facility] in wheelchair.  Hand off report given to Leah Castillo.

## 2021-01-05 NOTE — DISCHARGE INSTRUCTIONS
POST OPERATIVE PATIENT INFORMATION  Caring For Your Cristian Layton Drain      What is a Cristian Layton drain?    This is a small, flexible, plastic bulb that creates a gentle suction to remove extra fluid from your surgical wound.  The color and amount of fluid varies.  Immediately after surgery, the fluid is bright red and it gradually becomes a straw color.  When the amount decreases, your physician will remove it.    Daily care for your Cristian Layton:    Keep the bulb compressed at all times except while you empty it.  This creates the suction.  During the first few days after surgery, there is usually more fluid in the container.  Empty at least two times a day or more often if it becomes half full.  If it becomes too full, there will not be enough suction.    Where the tubes enter, keep the skin dry and covered with a light dressing.  If you notice any drainage around the tube sites, change the dressing.  If you see a clot in the tube, strip it (your nurse will show you how) and if the tube does not appear to be draining, call your physician.    Tape the tubes to the skin with paper tape 1-2 inches below the insertion sites.  The tubes are stitched in place and will not slip out.  Taping the tubes to the skin helps prevent pulling on the stitches.    You may take a shower, but keep the tubes and container dry.    Secure the container to your clothing with tape and a safety pin.    Always empty the bulb at the same time every day as instructed by your physician (or as necessary if it becomes   full) and keep track of the amount of drainage.    To empty the container:  1. Wash your hands.  2. Open the stopper to release the suction.  3. Hold the stopper out of the way and empty the drainage in the measuring container that has been sent home with you.  4. Measure and write down the amount.  If there are two drains, write down both amounts separately.  5. Compress the bulb by folding it in half.  6. Replace the  stopper.  7. Check to see that the tubing is taped to your skin.  Pin the bulb to your clothing.  8. Flush the drainage down the toilet and rinse the measuring cup.  Wash your hands.    Call you doctor if:    The drainage increases or has a bad odor    There is increased redness, warmth, or  pus-like  drainage around the tube insertion sites     The tube does not appear to be draining or falls out    You have problems or concerns    After clinic hours, call the Emergency Room.          Record your drainage amounts and bring this with you to your office visit.    DATE TIME TUBE 1 TUBE 2                                         Understanding Lysis of Adhesions     Lysis of adhesions is a surgery to cut bands of tissue that form between organs. These bands are called adhesions. They are often caused by scar tissue that formed after an earlier surgery. Adhesions can connect organs to each other. This can cause severe pain and stop organs from working well.    How to say it  Chanell Phipps  Why lysis of adhesions is done   Adhesions can cause severe, ongoing pain. Cutting the adhesions helps ease this pain. Adhesions can also cause blockage of the intestines. This blockage can lead to serious symptoms such as severe pain and vomiting. It can also cause long-term (permanent) damage to the intestines. It can even be fatal. You need surgery to prevent or treat these problems. Adhesions in the pelvis can also cause infertility in women.   How lysis of adhesions is done   Lysis of adhesions may be done using a method called laparoscopy. This method uses a few small cuts (incisions) in your belly (abdomen). Or it may be done as open surgery, with a large cut.       You are given medicine (general anesthesia). This puts you into a deep sleep through the procedure.    For a laparoscopy, the healthcare provider makes 2 to 4 small incisions in your belly. A long, thin, lighted tube (laparoscope) with a camera on the end is  placed in one of the cuts. The tube sends pictures of your belly to a video screen. This lets your healthcare provider see inside your belly. He or she puts tiny surgical tools through the other small cuts. The provider fills your belly with carbon dioxide. This gas makes more room in your belly so the provider can see and work more easily.    If open surgery is done, the provider makes a large cut in your belly. The laparoscope is not used.    The provider cuts and removes the adhesions. This lets the organs move more freely.    When the surgery is done, the scope and other tools are removed. The cuts are closed.  Risks of lysis of adhesions     Infection    Bleeding    Incisional hernia    Damage to abdominal organs    Damage to the intestine    Need to switch to open surgery    Return of the adhesions    Risks of anesthesia    Death    [employer] last reviewed this educational content on 6/1/2019 2000-2020 [employer]. [address] [place] [address]. All rights reserved. This information is not intended as a substitute for professional medical care. Always follow your healthcare professional's instructions.          Post-Anesthesia Patient Instructions    IMMEDIATELY FOLLOWING SURGERY:  Do not drive or operate machinery for the first twenty four hours after surgery.  Do not make any important decisions for twenty four hours after surgery or while taking narcotic pain medications or sedatives.  If you develop intractable nausea and vomiting or a severe headache please notify your doctor immediately.    FOLLOW-UP:  Please make an appointment with your surgeon as instructed. You do not need to follow up with anesthesia unless specifically instructed to do so.    WOUND CARE INSTRUCTIONS (if applicable):  Keep a dry clean dressing on the anesthesia/puncture wound site if there is drainage.  Once the wound has quit draining you may leave it open to air.  Generally you should leave the bandage  intact for twenty four hours unless there is drainage.  If the epidural site drains for more than 36-48 hours please call the anesthesia department.    QUESTIONS?:  Please feel free to call your physician or the hospital  if you have any questions, and they will be happy to assist you.

## 2021-01-06 ENCOUNTER — ALLIED HEALTH/NURSE VISIT (OUTPATIENT)
Dept: SURGERY | Facility: OTHER | Age: 56
End: 2021-01-06
Attending: SURGERY
Payer: COMMERCIAL

## 2021-01-06 DIAGNOSIS — K65.1 INTRA-ABDOMINAL ABSCESS (H): Primary | ICD-10-CM

## 2021-01-06 NOTE — PROGRESS NOTES
Patient came in today to have her packing changed from the procedure she had done yesterday by Dr. Fernandez. 4x4 around HAIR drain was removed and area of skin around was cleansed and redressed with another set of 4x4. Skin intact, no redness, drainage, or signs of infection. Packing removed from the right abdomen. Packing had bloody discharge on it but not signs of infection. Wound repacked with 1/4 inch packing tape. Patient tolerated very well. Skin around packing was cleansed and a 2x2 with tape was reapplied. Patient scheduled for tomorrow morning for another dressing change. Instructed her to call or go to the ED if she has any other questions or concerns with wounds. She understands instructions and will follow up as needed, or she will be here tomorrow (1/7/2021) for her dressing change.    Shelly Jackson LPN

## 2021-01-07 ENCOUNTER — OFFICE VISIT (OUTPATIENT)
Dept: SURGERY | Facility: OTHER | Age: 56
End: 2021-01-07
Attending: FAMILY MEDICINE
Payer: COMMERCIAL

## 2021-01-07 VITALS
BODY MASS INDEX: 37.92 KG/M2 | TEMPERATURE: 98.1 F | OXYGEN SATURATION: 98 % | DIASTOLIC BLOOD PRESSURE: 80 MMHG | HEART RATE: 90 BPM | WEIGHT: 214 LBS | HEIGHT: 63 IN | SYSTOLIC BLOOD PRESSURE: 120 MMHG

## 2021-01-07 DIAGNOSIS — Z48.00 ENCOUNTER FOR CHANGE OF DRESSING: Primary | ICD-10-CM

## 2021-01-07 ASSESSMENT — PAIN SCALES - GENERAL: PAINLEVEL: MODERATE PAIN (4)

## 2021-01-07 ASSESSMENT — MIFFLIN-ST. JEOR: SCORE: 1534.83

## 2021-01-07 NOTE — PROGRESS NOTES
Patient came in today to have her packing changed from the procedure she had done by Dr. Fernandez. 4x4 around HAIR drain was removed and area of skin around was cleansed and redressed with a drain sponge that is 4x4. Skin intact, no redness, drainage, or signs of infection. Packing removed from the right abdomen. Packing had bloody discharge on it but not signs of infection. Wound repacked with 1/4 inch packing tape. Patient tolerated very well. Skin around packing was cleansed and a 4x4 with tape was reapplied. Patient scheduled for tomorrow morning for another dressing change. Instructions were given verbally on changing dressing at home if needed. Patient has a bag of supplies that she brings to appointments. Patient understands how to do dressing changes at home if she needs to. Patient will be back in clinic tomorrow for an appointment with . Milagros Wheeler LPN

## 2021-01-07 NOTE — PATIENT INSTRUCTIONS
Thank you for allowing our surgical team to participate in your care. Please call our office at [phone] with scheduling questions or with any other questions or concerns.

## 2021-01-07 NOTE — NURSING NOTE
"Chief Complaint   Patient presents with     RECHECK     dressing change       Initial /80 (BP Location: Left arm, Patient Position: Chair, Cuff Size: Adult Large)   Pulse 90   Temp 98.1  F (36.7  C) (Tympanic)   Ht 1.6 m (5' 3\")   Wt 97.1 kg (214 lb)   SpO2 98%   BMI 37.91 kg/m   Estimated body mass index is 37.91 kg/m  as calculated from the following:    Height as of this encounter: 1.6 m (5' 3\").    Weight as of this encounter: 97.1 kg (214 lb).  Medication Reconciliation: complete  Milagros Wheeler LPN    Patient came in today to have her packing changed from the procedure she had done by Dr. Fernandez. 4x4 around HAIR drain was removed and area of skin around was cleansed and redressed with a drain sponge that is 4x4. Skin intact, no redness, drainage, or signs of infection. Packing removed from the right abdomen. Packing had bloody discharge on it but not signs of infection. Wound repacked with 1/4 inch packing tape. Patient tolerated very well. Skin around packing was cleansed and a 4x4 with tape was reapplied. Patient scheduled for tomorrow morning for another dressing change. Instructions were given verbally on changing dressing at home if needed. Patient has a bag of supplies that she brings to appointments. Patient understands how to do dressing changes at home if she needs to. Patient will be back in clinic tomorrow for an appointment with . Milagros Wheeler LPN     "

## 2021-01-08 ENCOUNTER — OFFICE VISIT (OUTPATIENT)
Dept: SURGERY | Facility: OTHER | Age: 56
End: 2021-01-08
Attending: SURGERY
Payer: COMMERCIAL

## 2021-01-08 ENCOUNTER — TELEPHONE (OUTPATIENT)
Dept: SURGERY | Facility: OTHER | Age: 56
End: 2021-01-08

## 2021-01-08 VITALS
TEMPERATURE: 98.6 F | WEIGHT: 214 LBS | BODY MASS INDEX: 37.91 KG/M2 | HEART RATE: 86 BPM | DIASTOLIC BLOOD PRESSURE: 88 MMHG | SYSTOLIC BLOOD PRESSURE: 110 MMHG | OXYGEN SATURATION: 97 %

## 2021-01-08 DIAGNOSIS — Z51.89 VISIT FOR WOUND CHECK: ICD-10-CM

## 2021-01-08 DIAGNOSIS — Z98.890 POSTOPERATIVE STATE: Primary | ICD-10-CM

## 2021-01-08 PROCEDURE — 99024 POSTOP FOLLOW-UP VISIT: CPT | Performed by: SURGERY

## 2021-01-08 ASSESSMENT — PAIN SCALES - GENERAL: PAINLEVEL: MODERATE PAIN (4)

## 2021-01-08 NOTE — PATIENT INSTRUCTIONS
Thank you for allowing Dr. Fernandez and our surgical team to participate in your care. Please call our health unit coordinator at [phone] with scheduling questions or the nurse at [phone] with any other questions or concerns.

## 2021-01-08 NOTE — NURSING NOTE
"Chief Complaint   Patient presents with     RECHECK     Peg Tube        Initial /88   Pulse 86   Temp 98.6  F (37  C) (Tympanic)   Wt 97.1 kg (214 lb)   SpO2 97%   BMI 37.91 kg/m   Estimated body mass index is 37.91 kg/m  as calculated from the following:    Height as of 1/7/21: 1.6 m (5' 3\").    Weight as of this encounter: 97.1 kg (214 lb).  Medication Reconciliation: complete  Mouna Morris LPN  "

## 2021-01-08 NOTE — TELEPHONE ENCOUNTER
Pt called and was wondering about the status of her short term disability paperwork, she has a second round as it is being extended. She stated that they sent her a copy and thought they sent general surgery a copy as well in the mail. We have not received any paperwork yet, she was confused and not sure if they sent it or not, they did not clarify what to do with it. She was not sure if she was supposed to bring her copy In or not to be filled out. Pt will bring her copy of the paperwork to her next apt on Wednesday 1/13/2020 to be filled out.      Soraya Tineo LPN

## 2021-01-11 NOTE — PROGRESS NOTES
[facility] Progress Note    HPI: RTC POD # 3 from laparoscopic exploration removal of retained fecalith.       S: She is doing well, denies pain other than incisions. Her drain output has been thin fluid, less than 25 ml a day. She is eating moving her bowels, denies fevers.     O:   Vitals:  /88   Pulse 86   Temp 98.6  F (37  C) (Tympanic)   Wt 97.1 kg (214 lb)   SpO2 97%   BMI 37.91 kg/m        Physical Exam:  G: alert oriented, no acute distress   ENT: sclera non-icteric   Pulm: no respiratory distress   CVS: RRR  ABD: soft non-tender non-distended, incisions intact without erythema or drainage. Drain with small serous fluid.   Ext: WWP     Assessment/Plan:  Removed abdominal drain today, discussed that unlikely be able to continue to pack old drain site due to how small the external opening. Will see back on Wednesday of next week for her previously scheduled follow up.     Albert Fernandez MD

## 2021-01-13 ENCOUNTER — OFFICE VISIT (OUTPATIENT)
Dept: SURGERY | Facility: OTHER | Age: 56
End: 2021-01-13
Attending: FAMILY MEDICINE
Payer: COMMERCIAL

## 2021-01-13 VITALS
TEMPERATURE: 98.2 F | BODY MASS INDEX: 37.92 KG/M2 | HEIGHT: 63 IN | DIASTOLIC BLOOD PRESSURE: 82 MMHG | RESPIRATION RATE: 16 BRPM | SYSTOLIC BLOOD PRESSURE: 122 MMHG | HEART RATE: 98 BPM | WEIGHT: 214 LBS | OXYGEN SATURATION: 98 %

## 2021-01-13 DIAGNOSIS — Z98.890 POSTOPERATIVE STATE: Primary | ICD-10-CM

## 2021-01-13 PROCEDURE — 99024 POSTOP FOLLOW-UP VISIT: CPT | Performed by: SURGERY

## 2021-01-13 ASSESSMENT — MIFFLIN-ST. JEOR: SCORE: 1529.83

## 2021-01-13 ASSESSMENT — PAIN SCALES - GENERAL: PAINLEVEL: NO PAIN (1)

## 2021-01-13 NOTE — PATIENT INSTRUCTIONS
Thank you for allowing Dr. Fernandez and our surgical team to participate in your care.  If you have a scheduling or an appointment question please contact our Health Unit Coordinator at her direct line [phone].   ALL nursing questions or concerns can be directed to your surgical nurse at: [phone]

## 2021-01-13 NOTE — PROGRESS NOTES
Short term disability forms completed, faxed to [facility], copy made for scanning and copy mailed to patient    Esmer Christina LPN on 1/13/2021 at 12:08 PM

## 2021-01-13 NOTE — NURSING NOTE
"Chief Complaint   Patient presents with     Surgical Followup     exploratory laparoscopy, lysis of adhesions, drainage of abscess 1/5/2021     Form Request     short term disability       Initial /82 (BP Location: Left arm, Cuff Size: Adult Large)   Pulse 98   Temp 98.2  F (36.8  C) (Tympanic)   Resp 16   Ht 1.6 m (5' 3\")   Wt 97.1 kg (214 lb)   SpO2 98%   BMI 37.91 kg/m   Estimated body mass index is 37.91 kg/m  as calculated from the following:    Height as of this encounter: 1.6 m (5' 3\").    Weight as of this encounter: 97.1 kg (214 lb).  Medication Reconciliation: complete  Esmer Christina LPN  "

## 2021-01-14 NOTE — PROGRESS NOTES
"[facility] Progress Note    HPI:55 y.o. female underwent Lap appendectomy on 11/23. Presented back on 11/29 with fever and abdominal pain found to have abscess in RLQ. On 11/30 drain was placed. She was observed in the hospital for a week and discharged on oral antibiotics. Sinogram on 12/14 with persistent cavity no communication with bowel noted.  Repeat of sinogram stable. Taken to Or for exploration on 1/5/2021. Drain from OR removed 1/8.       S: Doing well, feeling better everyday, eating, moving bowels, no fever.     O:   Vitals:  /82 (BP Location: Left arm, Cuff Size: Adult Large)   Pulse 98   Temp 98.2  F (36.8  C) (Tympanic)   Resp 16   Ht 1.6 m (5' 3\")   Wt 97.1 kg (214 lb)   SpO2 98%   BMI 37.91 kg/m        Physical Exam:  G: alert oriented, no acute distress   ENT: sclera non-icteric   Pulm: no respiratory distress   CVS: RRR  ABD: soft non-tender non-distended   Ext: WWP     Assessment/Plan:  Doing well, one more week of a lifting restriction. Ok to return to work next week form my prospective. Follow up PRN at this point.     Albert Fernandez MD     "

## 2021-01-25 ENCOUNTER — MYC MEDICAL ADVICE (OUTPATIENT)
Dept: FAMILY MEDICINE | Facility: OTHER | Age: 56
End: 2021-01-25

## 2021-01-25 DIAGNOSIS — E78.5 HYPERLIPIDEMIA, UNSPECIFIED HYPERLIPIDEMIA TYPE: Primary | ICD-10-CM

## 2021-01-25 NOTE — TELEPHONE ENCOUNTER
Please see pts mychart message- lab draws were noted, would you still like pt to schedule a in office follow up visit for refills? Or even virtual if labs not needed?  As far as covid vaccine goes, I will provide pt with the [facility] number advised to call to be scheduled.  Please advise r/t her office visit/labs  Laila Mendoza LPN

## 2021-01-25 NOTE — TELEPHONE ENCOUNTER
Lipids were last done 8/2019.  That needs to be fasting.  Orders placed.  I last saw patient 11/2019 -which is also over a year ago.  Therefore, yes, I do still need to see her to continue prescribing her statin and blood pressure medication.  Ok sometime within next month or so - next available is fine.

## 2021-01-26 ENCOUNTER — MYC MEDICAL ADVICE (OUTPATIENT)
Dept: FAMILY MEDICINE | Facility: OTHER | Age: 56
End: 2021-01-26

## 2021-01-26 NOTE — TELEPHONE ENCOUNTER
Could you please call patient back to re-schedule her cancelled appointment,   Thanks in advance.  Laila Mendoza LPN

## 2021-01-29 ENCOUNTER — RELEASE OF INFORMATION (OUTPATIENT)
Dept: SURGERY | Facility: OTHER | Age: 56
End: 2021-01-29

## 2021-02-15 NOTE — PROGRESS NOTES
"    Assessment & Plan     Hyperlipidemia, unspecified hyperlipidemia type  Tolerating statin.  Continue. Refills done.  Fasting labs obtained.  - Lipid Profile (Chol, Trig, HDL, LDL calc)  - simvastatin (ZOCOR) 20 MG tablet; Take 1 tablet (20 mg) by mouth At Bedtime    Benign essential hypertension  At goal.  Continue current regimen  - Comprehensive metabolic panel (BMP + Alb, Alk Phos, ALT, AST, Total. Bili, TP)    Morbid obesity (H)    Encounter for screening mammogram for breast cancer  - MA Screening Digital Bilateral; Future    Essential hypertension, benign  As above  - lisinopril-hydrochlorothiazide (ZESTORETIC) 20-25 MG tablet; Take 1 tablet by mouth daily           BMI:   Estimated body mass index is 38.97 kg/m  as calculated from the following:    Height as of 1/13/21: 1.6 m (5' 3\").    Weight as of this encounter: 99.8 kg (220 lb).   Weight management plan: Discussed healthy diet and exercise guidelines     Of note, had some concerns regarding initial surgical care and billing.  Directed to patient relations - website or phone number.      Patient Instructions   Medications refilled.  Will call with lab results.  Mammogram ordered.    Patient Relations: [phone].   Or visit [facility] on the web - Patient Relations.              Anne Morrison MD  [facility] - DIEGO Wiggins is a 56 year old who presents for the following health issuesHPI     Hyperlipidemia Follow-Up    Are you regularly taking any medication or supplement to lower your cholesterol?   Yes- zocor 20mg    Are you having muscle aches or other side effects that you think could be caused by your cholesterol lowering medication?  No    Hypertension Follow-up    Do you check your blood pressure regularly outside of the clinic? No     Are you following a low salt diet? Yes    Are your blood pressures ever more than 140 on the top number (systolic) OR more   than 90 on the bottom number (diastolic), for " example 140/90? No      How many servings of fruits and vegetables do you eat daily?  2-3    On average, how many sweetened beverages do you drink each day (Examples: soda, juice, sweet tea, etc.  Do NOT count diet or artificially sweetened beverages)?   0    How many days per week do you exercise enough to make your heart beat faster? 3 or less    How many minutes a day do you exercise enough to make your heart beat faster? 9 or less    How many days per week do you miss taking your medication? 0      Last mammogram 8/2019.    Review of Systems   Constitutional, HEENT, cardiovascular, pulmonary, gi and gu systems are negative, except as otherwise noted.      Objective    /82 (BP Location: Left arm, Patient Position: Chair, Cuff Size: Adult Large)   Pulse 93   Temp 98.2  F (36.8  C) (Tympanic)   Wt 99.8 kg (220 lb)   SpO2 94%   BMI 38.97 kg/m    Body mass index is 38.97 kg/m .  Physical Exam   GENERAL: alert, no distress and over weight  NECK: no adenopathy, no asymmetry, masses, or scars and thyroid normal to palpation  RESP: lungs clear to auscultation - no rales, rhonchi or wheezes  CV: regular rate and rhythm, normal S1 S2, no S3 or S4, no murmur, click or rub, no peripheral edema and peripheral pulses strong  ABDOMEN: soft, nontender, no hepatosplenomegaly, no masses and bowel sounds normal  ABDOMEN: no organomegaly or masses, bowel sounds normal and well-healed incision and non-tender  MS: no gross musculoskeletal defects noted, no edema  PSYCH: mentation appears normal, affect normal/bright    Labs pending

## 2021-02-19 ENCOUNTER — OFFICE VISIT (OUTPATIENT)
Dept: FAMILY MEDICINE | Facility: OTHER | Age: 56
End: 2021-02-19
Attending: FAMILY MEDICINE
Payer: COMMERCIAL

## 2021-02-19 VITALS
DIASTOLIC BLOOD PRESSURE: 82 MMHG | TEMPERATURE: 98.2 F | WEIGHT: 220 LBS | OXYGEN SATURATION: 94 % | SYSTOLIC BLOOD PRESSURE: 138 MMHG | HEART RATE: 93 BPM | BODY MASS INDEX: 38.97 KG/M2

## 2021-02-19 DIAGNOSIS — Z12.31 ENCOUNTER FOR SCREENING MAMMOGRAM FOR BREAST CANCER: ICD-10-CM

## 2021-02-19 DIAGNOSIS — E66.01 MORBID OBESITY (H): ICD-10-CM

## 2021-02-19 DIAGNOSIS — E78.5 HYPERLIPIDEMIA, UNSPECIFIED HYPERLIPIDEMIA TYPE: Primary | ICD-10-CM

## 2021-02-19 DIAGNOSIS — E87.6 HYPOKALEMIA: ICD-10-CM

## 2021-02-19 DIAGNOSIS — I10 ESSENTIAL HYPERTENSION, BENIGN: ICD-10-CM

## 2021-02-19 DIAGNOSIS — I10 BENIGN ESSENTIAL HYPERTENSION: ICD-10-CM

## 2021-02-19 PROBLEM — K65.1 INTRA-ABDOMINAL ABSCESS (H): Status: RESOLVED | Noted: 2020-12-30 | Resolved: 2021-02-19

## 2021-02-19 PROBLEM — R10.31 RLQ ABDOMINAL PAIN: Status: RESOLVED | Noted: 2020-11-29 | Resolved: 2021-02-19

## 2021-02-19 PROBLEM — R10.84 ABDOMINAL PAIN, GENERALIZED: Status: RESOLVED | Noted: 2020-11-22 | Resolved: 2021-02-19

## 2021-02-19 PROBLEM — Z48.00 ENCOUNTER FOR CHANGE OF DRESSING: Status: RESOLVED | Noted: 2020-12-17 | Resolved: 2021-02-19

## 2021-02-19 LAB
ALBUMIN SERPL-MCNC: 3.9 G/DL (ref 3.4–5)
ALP SERPL-CCNC: 80 U/L (ref 40–150)
ALT SERPL W P-5'-P-CCNC: 90 U/L (ref 0–50)
ANION GAP SERPL CALCULATED.3IONS-SCNC: 5 MMOL/L (ref 3–14)
AST SERPL W P-5'-P-CCNC: 60 U/L (ref 0–45)
BILIRUB SERPL-MCNC: 0.4 MG/DL (ref 0.2–1.3)
BUN SERPL-MCNC: 11 MG/DL (ref 7–30)
CALCIUM SERPL-MCNC: 10 MG/DL (ref 8.5–10.1)
CHLORIDE SERPL-SCNC: 100 MMOL/L (ref 94–109)
CHOLEST SERPL-MCNC: 221 MG/DL
CO2 SERPL-SCNC: 30 MMOL/L (ref 20–32)
CREAT SERPL-MCNC: 0.68 MG/DL (ref 0.52–1.04)
GFR SERPL CREATININE-BSD FRML MDRD: >90 ML/MIN/{1.73_M2}
GLUCOSE SERPL-MCNC: 95 MG/DL (ref 70–99)
HDLC SERPL-MCNC: 51 MG/DL
LDLC SERPL CALC-MCNC: 127 MG/DL
NONHDLC SERPL-MCNC: 170 MG/DL
POTASSIUM SERPL-SCNC: 3.2 MMOL/L (ref 3.4–5.3)
PROT SERPL-MCNC: 7.7 G/DL (ref 6.8–8.8)
SODIUM SERPL-SCNC: 135 MMOL/L (ref 133–144)
TRIGL SERPL-MCNC: 216 MG/DL

## 2021-02-19 PROCEDURE — 80053 COMPREHEN METABOLIC PANEL: CPT | Performed by: FAMILY MEDICINE

## 2021-02-19 PROCEDURE — 80061 LIPID PANEL: CPT | Performed by: FAMILY MEDICINE

## 2021-02-19 PROCEDURE — 36415 COLL VENOUS BLD VENIPUNCTURE: CPT | Performed by: FAMILY MEDICINE

## 2021-02-19 PROCEDURE — 99214 OFFICE O/P EST MOD 30 MIN: CPT | Performed by: FAMILY MEDICINE

## 2021-02-19 RX ORDER — POTASSIUM CHLORIDE 1500 MG/1
20 TABLET, EXTENDED RELEASE ORAL DAILY
Qty: 90 TABLET | Refills: 0 | Status: SHIPPED | OUTPATIENT
Start: 2021-02-19 | End: 2021-05-18

## 2021-02-19 RX ORDER — LISINOPRIL AND HYDROCHLOROTHIAZIDE 20; 25 MG/1; MG/1
1 TABLET ORAL DAILY
Qty: 90 TABLET | Refills: 3 | Status: SHIPPED | OUTPATIENT
Start: 2021-02-19 | End: 2022-02-21

## 2021-02-19 RX ORDER — SIMVASTATIN 20 MG
20 TABLET ORAL AT BEDTIME
Qty: 90 TABLET | Refills: 3 | Status: SHIPPED | OUTPATIENT
Start: 2021-02-19 | End: 2021-02-19

## 2021-02-19 RX ORDER — SIMVASTATIN 40 MG
40 TABLET ORAL AT BEDTIME
Qty: 90 TABLET | Refills: 3 | Status: SHIPPED | OUTPATIENT
Start: 2021-02-19 | End: 2022-02-21

## 2021-02-19 ASSESSMENT — PAIN SCALES - GENERAL: PAINLEVEL: NO PAIN (0)

## 2021-02-19 NOTE — NURSING NOTE
"Chief Complaint   Patient presents with     Hypertension     Lipids       Initial /82 (BP Location: Left arm, Patient Position: Chair, Cuff Size: Adult Large)   Pulse 93   Temp 98.2  F (36.8  C) (Tympanic)   Wt 99.8 kg (220 lb)   SpO2 94%   BMI 38.97 kg/m   Estimated body mass index is 38.97 kg/m  as calculated from the following:    Height as of 1/13/21: 1.6 m (5' 3\").    Weight as of this encounter: 99.8 kg (220 lb).  Medication Reconciliation: complete  Tommy Diez LPN  "

## 2021-02-19 NOTE — PATIENT INSTRUCTIONS
Medications refilled.  Will call with lab results.  Mammogram ordered.    Patient Relations: [phone].   Or visit [facility] on the web - Patient Relations.

## 2021-05-21 DIAGNOSIS — E78.5 HYPERLIPIDEMIA, UNSPECIFIED HYPERLIPIDEMIA TYPE: ICD-10-CM

## 2021-05-21 DIAGNOSIS — E87.6 HYPOKALEMIA: ICD-10-CM

## 2021-05-21 LAB
ALBUMIN SERPL-MCNC: 3.9 G/DL (ref 3.4–5)
ALP SERPL-CCNC: 75 U/L (ref 40–150)
ALT SERPL W P-5'-P-CCNC: 73 U/L (ref 0–50)
AST SERPL W P-5'-P-CCNC: 52 U/L (ref 0–45)
BILIRUB DIRECT SERPL-MCNC: 0.1 MG/DL (ref 0–0.2)
BILIRUB SERPL-MCNC: 0.5 MG/DL (ref 0.2–1.3)
CHOLEST SERPL-MCNC: 213 MG/DL
HDLC SERPL-MCNC: 50 MG/DL
LDLC SERPL CALC-MCNC: 126 MG/DL
NONHDLC SERPL-MCNC: 163 MG/DL
POTASSIUM SERPL-SCNC: 4.1 MMOL/L (ref 3.4–5.3)
PROT SERPL-MCNC: 7.7 G/DL (ref 6.8–8.8)
TRIGL SERPL-MCNC: 185 MG/DL

## 2021-05-21 PROCEDURE — 84132 ASSAY OF SERUM POTASSIUM: CPT | Performed by: FAMILY MEDICINE

## 2021-05-21 PROCEDURE — 36415 COLL VENOUS BLD VENIPUNCTURE: CPT | Performed by: FAMILY MEDICINE

## 2021-05-21 PROCEDURE — 80076 HEPATIC FUNCTION PANEL: CPT | Performed by: FAMILY MEDICINE

## 2021-05-21 PROCEDURE — 80061 LIPID PANEL: CPT | Performed by: FAMILY MEDICINE

## 2021-06-14 ENCOUNTER — TELEPHONE (OUTPATIENT)
Dept: MAMMOGRAPHY | Facility: OTHER | Age: 56
End: 2021-06-14

## 2021-06-14 DIAGNOSIS — Z12.31 ENCOUNTER FOR SCREENING MAMMOGRAM FOR BREAST CANCER: ICD-10-CM

## 2021-06-14 PROCEDURE — 77063 BREAST TOMOSYNTHESIS BI: CPT | Mod: TC | Performed by: FAMILY MEDICINE

## 2021-06-14 PROCEDURE — 77067 SCR MAMMO BI INCL CAD: CPT | Mod: TC | Performed by: FAMILY MEDICINE

## 2021-10-09 ENCOUNTER — HEALTH MAINTENANCE LETTER (OUTPATIENT)
Age: 56
End: 2021-10-09

## 2022-01-29 ENCOUNTER — HEALTH MAINTENANCE LETTER (OUTPATIENT)
Age: 57
End: 2022-01-29

## 2022-05-12 NOTE — PROGRESS NOTES
Assessment & Plan     Hyperlipidemia, unspecified hyperlipidemia type  On statin.  Due for fasting labs.  Not fasting today.  Scheduled for next month.  - Lipid Profile (Chol, Trig, HDL, LDL calc); Future  - Comprehensive metabolic panel (BMP + Alb, Alk Phos, ALT, AST, Total. Bili, TP); Future  - simvastatin (ZOCOR) 40 MG tablet; Take 1 tablet (40 mg) by mouth At Bedtime    Benign essential hypertension  - lisinopril-hydrochlorothiazide (ZESTORETIC) 20-25 MG tablet; Take 1 tablet by mouth daily  Not at goal.  Add Norvasc 5 mg daily.  Instructed to monitor for pedal edema.  Follow up 1-2 months.  Recheck BMP at that time as well.  - Comprehensive metabolic panel (BMP + Alb, Alk Phos, ALT, AST, Total. Bili, TP); Future  - amLODIPine (NORVASC) 5 MG tablet; Take 1 tablet (5 mg) by mouth daily    Morbid obesity (H)  Working on exercise - yoga, walking.  Weight loss efforts to help with BP as well.    Patient Instructions   Add Norvasc 5 mg daily for blood pressure.  Goal <130/80.  Other medications refilled.  Fasting labs in 6/2022.      Return in about 6 weeks (around 6/27/2022) for BP Recheck; and fasting labs.    Anne Morrison MD  [facility] - [place] Wiggins is a 57 year old who presents for the following health issues     HPI       Hyperlipidemia Follow-Up    Are you regularly taking any medication or supplement to lower your cholesterol?   Yes- ZOCOR    Are you having muscle aches or other side effects that you think could be caused by your cholesterol lowering medication?  No    Hypertension Follow-up    Do you check your blood pressure regularly outside of the clinic? No     Are you following a low salt diet? No    Are your blood pressures ever more than 140 on the top number (systolic) OR more   than 90 on the bottom number (diastolic), for example 140/90? N/a  Potassium supplement expensive - ordered off amaz[employer] instead.  Wearing mask today - makes her  claustrophobic.      Depression and Anxiety Follow-Up    How are you doing with your depression since your last visit? Non-issue    How are you doing with your anxiety since your last visit?  No change    Are you having other symptoms that might be associated with depression or anxiety? No    Have you had a significant life event? No     Do you have any concerns with your use of alcohol or other drugs? No    Social History     Tobacco Use     Smoking status: Never Smoker     Smokeless tobacco: Never Used   Substance Use Topics     Alcohol use: Yes     Comment: occasionally, 2x/week     Drug use: No     PHQ 5/24/2018 8/5/2019 5/16/2022   PHQ-9 Total Score 2 3 0   Q9: Thoughts of better off dead/self-harm past 2 weeks Not at all Not at all Not at all     VARSHA-7 SCORE 5/24/2018 8/5/2019 5/16/2022   Total Score 3 4 0     Last PHQ-9 5/16/2022   1.  Little interest or pleasure in doing things 0   2.  Feeling down, depressed, or hopeless 0   3.  Trouble falling or staying asleep, or sleeping too much 0   4.  Feeling tired or having little energy 0   5.  Poor appetite or overeating 0   6.  Feeling bad about yourself 0   7.  Trouble concentrating 0   8.  Moving slowly or restless 0   Q9: Thoughts of better off dead/self-harm past 2 weeks 0   PHQ-9 Total Score 0   Difficulty at work, home, or with people -     VARSHA-7  5/16/2022   1. Feeling nervous, anxious, or on edge 0   2. Not being able to stop or control worrying 0   3. Worrying too much about different things 0   4. Trouble relaxing 0   5. Being so restless that it is hard to sit still 0   6. Becoming easily annoyed or irritable 0   7. Feeling afraid, as if something awful might happen 0   VARSHA-7 Total Score 0   If you checked any problems, how difficult have they made it for you to do your work, take care of things at home, or get along with other people? -       Suicide Assessment Five-step Evaluation and Treatment (SAFE-T)      How many servings of fruits and vegetables  do you eat daily?  2-3    On average, how many sweetened beverages do you drink each day (Examples: soda, juice, sweet tea, etc.  Do NOT count diet or artificially sweetened beverages)?   0    How many days per week do you exercise enough to make your heart beat faster? 3 or less    How many minutes a day do you exercise enough to make your heart beat faster? 9 or less    How many days per week do you miss taking your medication? 0      Review of Systems   Constitutional, HEENT, cardiovascular, pulmonary, gi and gu systems are negative, except as otherwise noted.      Objective    BP (!) 152/88   Pulse 104   Temp 97  F (36.1  C)   Resp 18   Wt 104.3 kg (230 lb)   LMP 10/18/2017   SpO2 97%   BMI 40.74 kg/m    Body mass index is 40.74 kg/m .  Physical Exam   GENERAL: alert, no distress and over weight  NECK: no adenopathy, no asymmetry, masses, or scars and thyroid normal to palpation  RESP: lungs clear to auscultation - no rales, rhonchi or wheezes  CV: regular rate and rhythm, normal S1 S2, no S3 or S4, no murmur, click or rub, no peripheral edema and peripheral pulses strong  MS: no gross musculoskeletal defects noted, no edema  PSYCH: mentation appears normal, affect normal/bright    Future labs ordered.

## 2022-05-15 PROBLEM — K35.30 ACUTE APPENDICITIS WITH LOCALIZED PERITONITIS WITHOUT ABSCESS, UNSPECIFIED WHETHER GANGRENE PRESENT, UNSPECIFIED WHETHER PERFORATION PRESENT: Status: RESOLVED | Noted: 2020-11-22 | Resolved: 2022-05-15

## 2022-05-16 ENCOUNTER — OFFICE VISIT (OUTPATIENT)
Dept: FAMILY MEDICINE | Facility: OTHER | Age: 57
End: 2022-05-16
Attending: FAMILY MEDICINE
Payer: COMMERCIAL

## 2022-05-16 VITALS
BODY MASS INDEX: 40.74 KG/M2 | OXYGEN SATURATION: 97 % | RESPIRATION RATE: 18 BRPM | DIASTOLIC BLOOD PRESSURE: 88 MMHG | WEIGHT: 230 LBS | TEMPERATURE: 97 F | HEART RATE: 104 BPM | SYSTOLIC BLOOD PRESSURE: 152 MMHG

## 2022-05-16 DIAGNOSIS — E78.5 HYPERLIPIDEMIA, UNSPECIFIED HYPERLIPIDEMIA TYPE: Primary | ICD-10-CM

## 2022-05-16 DIAGNOSIS — I10 ESSENTIAL HYPERTENSION, BENIGN: ICD-10-CM

## 2022-05-16 DIAGNOSIS — I10 BENIGN ESSENTIAL HYPERTENSION: ICD-10-CM

## 2022-05-16 DIAGNOSIS — E66.01 MORBID OBESITY (H): ICD-10-CM

## 2022-05-16 PROCEDURE — 99214 OFFICE O/P EST MOD 30 MIN: CPT | Performed by: FAMILY MEDICINE

## 2022-05-16 RX ORDER — LISINOPRIL AND HYDROCHLOROTHIAZIDE 20; 25 MG/1; MG/1
1 TABLET ORAL DAILY
Qty: 90 TABLET | Refills: 0 | Status: SHIPPED | OUTPATIENT
Start: 2022-05-16 | End: 2022-07-25 | Stop reason: DRUGHIGH

## 2022-05-16 RX ORDER — AMLODIPINE BESYLATE 5 MG/1
5 TABLET ORAL DAILY
Qty: 90 TABLET | Refills: 0 | Status: SHIPPED | OUTPATIENT
Start: 2022-05-16 | End: 2022-07-25 | Stop reason: SINTOL

## 2022-05-16 RX ORDER — PYRIDOXINE HCL (VITAMIN B6) 100 MG
TABLET ORAL
COMMUNITY
End: 2023-11-13

## 2022-05-16 RX ORDER — SIMVASTATIN 20 MG
TABLET ORAL
COMMUNITY
Start: 2021-05-19 | End: 2022-05-16

## 2022-05-16 RX ORDER — MULTIVIT WITH MINERALS/LUTEIN
TABLET ORAL
COMMUNITY
End: 2023-11-13

## 2022-05-16 RX ORDER — SIMVASTATIN 40 MG
40 TABLET ORAL AT BEDTIME
Qty: 90 TABLET | Refills: 0 | Status: SHIPPED | OUTPATIENT
Start: 2022-05-16 | End: 2022-08-25

## 2022-05-16 RX ORDER — ZINC GLUCONATE 50 MG
TABLET ORAL
COMMUNITY
Start: 2021-09-06 | End: 2023-11-13

## 2022-05-16 ASSESSMENT — ANXIETY QUESTIONNAIRES
2. NOT BEING ABLE TO STOP OR CONTROL WORRYING: NOT AT ALL
GAD7 TOTAL SCORE: 0
6. BECOMING EASILY ANNOYED OR IRRITABLE: NOT AT ALL
5. BEING SO RESTLESS THAT IT IS HARD TO SIT STILL: NOT AT ALL
7. FEELING AFRAID AS IF SOMETHING AWFUL MIGHT HAPPEN: NOT AT ALL
3. WORRYING TOO MUCH ABOUT DIFFERENT THINGS: NOT AT ALL
4. TROUBLE RELAXING: NOT AT ALL
1. FEELING NERVOUS, ANXIOUS, OR ON EDGE: NOT AT ALL

## 2022-05-16 ASSESSMENT — PATIENT HEALTH QUESTIONNAIRE - PHQ9: SUM OF ALL RESPONSES TO PHQ QUESTIONS 1-9: 0

## 2022-05-16 ASSESSMENT — PAIN SCALES - GENERAL: PAINLEVEL: NO PAIN (0)

## 2022-05-16 NOTE — PATIENT INSTRUCTIONS
Add Norvasc 5 mg daily for blood pressure.  Goal <130/80.  Other medications refilled.  Fasting labs in 6/2022.

## 2022-05-16 NOTE — NURSING NOTE
"Chief Complaint   Patient presents with     Hypertension     Lipids     Depression     Anxiety       Initial BP (!) 158/88   Pulse 104   Temp 97  F (36.1  C)   Resp 18   Wt 104.3 kg (230 lb)   LMP 10/18/2017   SpO2 97%   BMI 40.74 kg/m   Estimated body mass index is 40.74 kg/m  as calculated from the following:    Height as of 1/13/21: 1.6 m (5' 3\").    Weight as of this encounter: 104.3 kg (230 lb).  Medication Reconciliation: drew Thomson  "

## 2022-05-17 ASSESSMENT — ANXIETY QUESTIONNAIRES: GAD7 TOTAL SCORE: 0

## 2022-06-23 NOTE — PROGRESS NOTES
Assessment & Plan     Benign essential hypertension  Improved with addition of Norvasc.  Continue current regimen.  Can monitor at home as discussed.      Hyperlipidemia, unspecified hyperlipidemia type  Improved with current dose of statin.  Continue.    Morbid obesity (H)  Ongoing diet, exercise, weight management efforts.    Hypokalemia  Mild.  On potassium supplement daily.  Can increase via diet as well, which she prefers - educational handout included with list of potassium rich foods.       Patient Instructions   Continue current medications.  Can monitor BP at home - goal <130/80.  Work on potassium rich foods in diet - see handout attached.  Enjoy your summer!            Anne Morrison MD  [facility] - [place] Wiggins is a 57 year old, presenting for the following health issues:  Hypertension      HPI     Hyperlipidemia Follow-Up - due for fasting labs - fasting today    Are you regularly taking any medication or supplement to lower your cholesterol?   Yes- simvastatin    Are you having muscle aches or other side effects that you think could be caused by your cholesterol lowering medication?  No    Hypertension Follow-up - Norvasc 5 mg added to Zestoretic 5/16/22    Do you check your blood pressure regularly outside of the clinic? No     Are you following a low salt diet? No    Are your blood pressures ever more than 140 on the top number (systolic) OR more   than 90 on the bottom number (diastolic), for example 140/90? No   Has home cuff - doesn't use it.  On potassium supplement.  20 meq daily.  Prefers to add 1/2 banana in diet to help further.    Since covid - taste is different; doesn't like alcohol.  Foods taste salty.      How many servings of fruits and vegetables do you eat daily?  2-3    On average, how many sweetened beverages do you drink each day (Examples: soda, juice, sweet tea, etc.  Do NOT count diet or artificially sweetened beverages)?   0    How many days  per week do you exercise enough to make your heart beat faster? 4    How many minutes a day do you exercise enough to make your heart beat faster? 20 - 29    How many days per week do you miss taking your medication? 0      Review of Systems   Constitutional, HEENT, cardiovascular, pulmonary, gi and gu systems are negative, except as otherwise noted.      Objective    /74 (BP Location: Left arm, Patient Position: Sitting, Cuff Size: Adult Regular)   Pulse 86   Temp 97.3  F (36.3  C) (Tympanic)   Wt 104.3 kg (230 lb)   LMP 10/18/2017   SpO2 97%   BMI 40.74 kg/m    Body mass index is 40.74 kg/m .  Physical Exam   GENERAL: alert, no distress and over weight  NECK: no adenopathy, no asymmetry, masses, or scars and thyroid normal to palpation  RESP: lungs clear to auscultation - no rales, rhonchi or wheezes  CV: regular rate and rhythm, normal S1 S2, no S3 or S4, no murmur, click or rub, no peripheral edema and peripheral pulses strong  MS: no gross musculoskeletal defects noted, no edema  PSYCH: mentation appears normal, affect normal/bright    Results for orders placed or performed in visit on 06/27/22 (from the past 24 hour(s))   Comprehensive metabolic panel (BMP + Alb, Alk Phos, ALT, AST, Total. Bili, TP)   Result Value Ref Range    Sodium 141 133 - 144 mmol/L    Potassium 3.3 (L) 3.4 - 5.3 mmol/L    Chloride 105 94 - 109 mmol/L    Carbon Dioxide (CO2) 31 20 - 32 mmol/L    Anion Gap 5 3 - 14 mmol/L    Urea Nitrogen 10 7 - 30 mg/dL    Creatinine 0.71 0.52 - 1.04 mg/dL    Calcium 9.2 8.5 - 10.1 mg/dL    Glucose 98 70 - 99 mg/dL    Alkaline Phosphatase 80 40 - 150 U/L    AST 35 0 - 45 U/L    ALT 54 (H) 0 - 50 U/L    Protein Total 7.7 6.8 - 8.8 g/dL    Albumin 3.8 3.4 - 5.0 g/dL    Bilirubin Total 0.4 0.2 - 1.3 mg/dL    GFR Estimate >90 >60 mL/min/1.73m2   Lipid Profile (Chol, Trig, HDL, LDL calc)   Result Value Ref Range    Cholesterol 165 <200 mg/dL    Triglycerides 127 <150 mg/dL    Direct Measure HDL 51  >=50 mg/dL    LDL Cholesterol Calculated 89 <=100 mg/dL    Non HDL Cholesterol 114 <130 mg/dL    Patient Fasting > 8hrs? Yes     Narrative    Cholesterol  Desirable:  <200 mg/dL    Triglycerides  Normal:  Less than 150 mg/dL  Borderline High:  150-199 mg/dL  High:  200-499 mg/dL  Very High:  Greater than or equal to 500 mg/dL    Direct Measure HDL  Female:  Greater than or equal to 50 mg/dL   Male:  Greater than or equal to 40 mg/dL    LDL Cholesterol  Desirable:  <100mg/dL  Above Desirable:  100-129 mg/dL   Borderline High:  130-159 mg/dL   High:  160-189 mg/dL   Very High:  >= 190 mg/dL    Non HDL Cholesterol  Desirable:  130 mg/dL  Above Desirable:  130-159 mg/dL  Borderline High:  160-189 mg/dL  High:  190-219 mg/dL  Very High:  Greater than or equal to 220 mg/dL                   .  ..

## 2022-06-27 ENCOUNTER — LAB (OUTPATIENT)
Dept: LAB | Facility: OTHER | Age: 57
End: 2022-06-27
Attending: FAMILY MEDICINE
Payer: COMMERCIAL

## 2022-06-27 ENCOUNTER — OFFICE VISIT (OUTPATIENT)
Dept: FAMILY MEDICINE | Facility: OTHER | Age: 57
End: 2022-06-27
Attending: FAMILY MEDICINE
Payer: COMMERCIAL

## 2022-06-27 VITALS
OXYGEN SATURATION: 97 % | WEIGHT: 230 LBS | TEMPERATURE: 97.3 F | HEART RATE: 86 BPM | SYSTOLIC BLOOD PRESSURE: 132 MMHG | BODY MASS INDEX: 40.74 KG/M2 | DIASTOLIC BLOOD PRESSURE: 74 MMHG

## 2022-06-27 DIAGNOSIS — I10 BENIGN ESSENTIAL HYPERTENSION: ICD-10-CM

## 2022-06-27 DIAGNOSIS — E66.01 MORBID OBESITY (H): ICD-10-CM

## 2022-06-27 DIAGNOSIS — E78.5 HYPERLIPIDEMIA, UNSPECIFIED HYPERLIPIDEMIA TYPE: ICD-10-CM

## 2022-06-27 DIAGNOSIS — I10 BENIGN ESSENTIAL HYPERTENSION: Primary | ICD-10-CM

## 2022-06-27 DIAGNOSIS — E87.6 HYPOKALEMIA: ICD-10-CM

## 2022-06-27 LAB
ALBUMIN SERPL-MCNC: 3.8 G/DL (ref 3.4–5)
ALP SERPL-CCNC: 80 U/L (ref 40–150)
ALT SERPL W P-5'-P-CCNC: 54 U/L (ref 0–50)
ANION GAP SERPL CALCULATED.3IONS-SCNC: 5 MMOL/L (ref 3–14)
AST SERPL W P-5'-P-CCNC: 35 U/L (ref 0–45)
BILIRUB SERPL-MCNC: 0.4 MG/DL (ref 0.2–1.3)
BUN SERPL-MCNC: 10 MG/DL (ref 7–30)
CALCIUM SERPL-MCNC: 9.2 MG/DL (ref 8.5–10.1)
CHLORIDE BLD-SCNC: 105 MMOL/L (ref 94–109)
CHOLEST SERPL-MCNC: 165 MG/DL
CO2 SERPL-SCNC: 31 MMOL/L (ref 20–32)
CREAT SERPL-MCNC: 0.71 MG/DL (ref 0.52–1.04)
FASTING STATUS PATIENT QL REPORTED: YES
GFR SERPL CREATININE-BSD FRML MDRD: >90 ML/MIN/1.73M2
GLUCOSE BLD-MCNC: 98 MG/DL (ref 70–99)
HDLC SERPL-MCNC: 51 MG/DL
LDLC SERPL CALC-MCNC: 89 MG/DL
NONHDLC SERPL-MCNC: 114 MG/DL
POTASSIUM BLD-SCNC: 3.3 MMOL/L (ref 3.4–5.3)
PROT SERPL-MCNC: 7.7 G/DL (ref 6.8–8.8)
SODIUM SERPL-SCNC: 141 MMOL/L (ref 133–144)
TRIGL SERPL-MCNC: 127 MG/DL

## 2022-06-27 PROCEDURE — 80061 LIPID PANEL: CPT

## 2022-06-27 PROCEDURE — 99214 OFFICE O/P EST MOD 30 MIN: CPT | Performed by: FAMILY MEDICINE

## 2022-06-27 PROCEDURE — 80053 COMPREHEN METABOLIC PANEL: CPT

## 2022-06-27 PROCEDURE — 36415 COLL VENOUS BLD VENIPUNCTURE: CPT

## 2022-06-27 ASSESSMENT — PAIN SCALES - GENERAL: PAINLEVEL: NO PAIN (0)

## 2022-06-27 NOTE — NURSING NOTE
"Chief Complaint   Patient presents with     Hypertension       Initial /74 (BP Location: Left arm, Patient Position: Sitting, Cuff Size: Adult Regular)   Pulse 86   Temp 97.3  F (36.3  C) (Tympanic)   Wt 104.3 kg (230 lb)   LMP 10/18/2017   SpO2 97%   BMI 40.74 kg/m   Estimated body mass index is 40.74 kg/m  as calculated from the following:    Height as of 1/13/21: 1.6 m (5' 3\").    Weight as of this encounter: 104.3 kg (230 lb).  Medication Reconciliation: complete  Nataly Kenny LPN  "

## 2022-06-27 NOTE — PATIENT INSTRUCTIONS
Continue current medications.  Can monitor BP at home - goal <130/80.  Work on potassium rich foods in diet - see handout attached.  Enjoy your summer!

## 2022-07-26 ENCOUNTER — TELEPHONE (OUTPATIENT)
Dept: MAMMOGRAPHY | Facility: OTHER | Age: 57
End: 2022-07-26

## 2022-07-26 ENCOUNTER — ANCILLARY PROCEDURE (OUTPATIENT)
Dept: MAMMOGRAPHY | Facility: OTHER | Age: 57
End: 2022-07-26
Attending: FAMILY MEDICINE
Payer: COMMERCIAL

## 2022-07-26 DIAGNOSIS — Z12.31 VISIT FOR SCREENING MAMMOGRAM: ICD-10-CM

## 2022-07-26 PROCEDURE — 77067 SCR MAMMO BI INCL CAD: CPT | Mod: TC | Performed by: RADIOLOGY

## 2022-07-26 PROCEDURE — 77063 BREAST TOMOSYNTHESIS BI: CPT | Mod: TC | Performed by: RADIOLOGY

## 2022-08-23 DIAGNOSIS — I10 BENIGN ESSENTIAL HYPERTENSION: ICD-10-CM

## 2022-08-23 DIAGNOSIS — E78.5 HYPERLIPIDEMIA, UNSPECIFIED HYPERLIPIDEMIA TYPE: ICD-10-CM

## 2022-08-25 RX ORDER — AMLODIPINE BESYLATE 5 MG/1
TABLET ORAL
Qty: 90 TABLET | Refills: 0 | Status: SHIPPED | OUTPATIENT
Start: 2022-08-25 | End: 2022-11-03

## 2022-08-25 RX ORDER — LISINOPRIL 20 MG/1
20 TABLET ORAL DAILY
Qty: 90 TABLET | Refills: 0 | Status: SHIPPED | OUTPATIENT
Start: 2022-08-25 | End: 2022-11-03

## 2022-08-25 RX ORDER — CHLORTHALIDONE 25 MG/1
25 TABLET ORAL DAILY
Qty: 90 TABLET | Refills: 0 | Status: SHIPPED | OUTPATIENT
Start: 2022-08-25 | End: 2022-11-03

## 2022-08-25 RX ORDER — SIMVASTATIN 40 MG
TABLET ORAL
Qty: 90 TABLET | Refills: 0 | Status: SHIPPED | OUTPATIENT
Start: 2022-08-25 | End: 2022-11-03

## 2022-08-25 NOTE — TELEPHONE ENCOUNTER
Hygroton     Last Written Prescription Date:  7/25/22  Last Fill Quantity: 90,   # refills: 0  Last Office Visit: 6/27/22  Future Office visit:       Routing refill request to provider for review/approval because:      Lisinopril      Last Written Prescription Date:  7/25/22  Last Fill Quantity: 90,   # refills: 0  Last Office Visit: 6/27/22  Future Office visit:       Routing refill request to provider for review/approval because:      Simvastatin      Last Written Prescription Date:  5/16/22  Last Fill Quantity: 90,   # refills: 0  Last Office Visit: 6/27/22  Future Office visit:       Routing refill request to provider for review/approval because:      Norvasc      Last Written Prescription Date:  0  Last Fill Quantity: 0,   # refills: 0  Last Office Visit: 6/27/22  Future Office visit:       Routing refill request to provider for review/approval because:  Drug not active on patient's medication list

## 2022-09-17 ENCOUNTER — HEALTH MAINTENANCE LETTER (OUTPATIENT)
Age: 57
End: 2022-09-17

## 2022-11-01 DIAGNOSIS — I10 BENIGN ESSENTIAL HYPERTENSION: ICD-10-CM

## 2022-11-01 DIAGNOSIS — E78.5 HYPERLIPIDEMIA, UNSPECIFIED HYPERLIPIDEMIA TYPE: ICD-10-CM

## 2022-11-03 RX ORDER — SIMVASTATIN 40 MG
TABLET ORAL
Qty: 90 TABLET | Refills: 0 | Status: SHIPPED | OUTPATIENT
Start: 2022-11-03 | End: 2023-03-13

## 2022-11-03 RX ORDER — AMLODIPINE BESYLATE 5 MG/1
TABLET ORAL
Qty: 90 TABLET | Refills: 0 | Status: SHIPPED | OUTPATIENT
Start: 2022-11-03 | End: 2023-03-03 | Stop reason: ALTCHOICE

## 2022-11-03 RX ORDER — CHLORTHALIDONE 25 MG/1
25 TABLET ORAL DAILY
Qty: 90 TABLET | Refills: 0 | Status: SHIPPED | OUTPATIENT
Start: 2022-11-03 | End: 2023-03-13

## 2022-11-03 RX ORDER — LISINOPRIL 20 MG/1
20 TABLET ORAL DAILY
Qty: 90 TABLET | Refills: 0 | Status: SHIPPED | OUTPATIENT
Start: 2022-11-03 | End: 2023-03-13

## 2022-11-03 NOTE — TELEPHONE ENCOUNTER
amLODIPine      Last Written Prescription Date:  8/25/22  Last Fill Quantity: 90,   # refills: 0  Last Office Visit: 6/27/22  Future Office visit:       Routing refill request to provider for review/approval because:      chlorthalidone      Last Written Prescription Date:  8/25/22  Last Fill Quantity: 90,   # refills: 0  Last Office Visit: 6/27/22  Future Office visit:       Routing refill request to provider for review/approval because:      lisinopril      Last Written Prescription Date:  8/25/22  Last Fill Quantity: 90,   # refills: 0  Last Office Visit: 6/27/22  Future Office visit:       Routing refill request to provider for review/approval because:      simvastatin      Last Written Prescription Date:  8/25/22  Last Fill Quantity: 90,   # refills: 0  Last Office Visit: 6/27/22  Future Office visit:       Routing refill request to provider for review/approval because:

## 2023-01-04 ENCOUNTER — TRANSFERRED RECORDS (OUTPATIENT)
Dept: HEALTH INFORMATION MANAGEMENT | Facility: CLINIC | Age: 58
End: 2023-01-04

## 2023-01-05 ENCOUNTER — TELEPHONE (OUTPATIENT)
Dept: FAMILY MEDICINE | Facility: OTHER | Age: 58
End: 2023-01-05

## 2023-01-05 DIAGNOSIS — G56.03 BILATERAL CARPAL TUNNEL SYNDROME: Primary | ICD-10-CM

## 2023-01-05 DIAGNOSIS — G56.22 CUBITAL TUNNEL SYNDROME ON LEFT: ICD-10-CM

## 2023-01-05 NOTE — TELEPHONE ENCOUNTER
Reviewed EMG results.    Moderately severe right carpal tunnel.  Mild to moderate left carpal tunnel.  Moderate left cubital tunnel.    Referral placed to ortho.  Notify patient.

## 2023-01-23 ENCOUNTER — TRANSFERRED RECORDS (OUTPATIENT)
Dept: HEALTH INFORMATION MANAGEMENT | Facility: CLINIC | Age: 58
End: 2023-01-23

## 2023-01-27 ENCOUNTER — PREP FOR PROCEDURE (OUTPATIENT)
Dept: SURGERY | Facility: CLINIC | Age: 58
End: 2023-01-27

## 2023-01-27 DIAGNOSIS — G56.22 CUBITAL TUNNEL SYNDROME, LEFT: ICD-10-CM

## 2023-01-27 DIAGNOSIS — G56.03 CARPAL TUNNEL SYNDROME, BILATERAL: Primary | ICD-10-CM

## 2023-03-02 NOTE — PROGRESS NOTES
[facility] [place]  [address] [facility] [address]  Phone: [phone]  Primary Provider: Anne Solorio  Pre-op Performing Provider: ANDREW NAVARRO      PREOPERATIVE EVALUATION:  Today's date: 3/3/2023    Rosalie Stewart is a 58 year old female who presents for a preoperative evaluation.    Surgical Information:  Surgery/Procedure: Bilateral CTR  [place] [facility] [place]: Dr. Palacios  Surgery Date: 3/20/23  Time of Surgery: TBD  Where patient plans to recover: At home with family  Fax number for surgical facility: [facility]    Type of Anesthesia Anticipated: to be determined    Assessment & Plan     The proposed surgical procedure is considered LOW risk.    Preop general physical exam  Bilateral carpal tunnel syndrome  Cleared for procedure   - CBC with platelets and differential; Future  - Basic metabolic panel; Future  - EKG 12-lead complete w/read - (Clinic Performed)           Risks and Recommendations:  The patient has the following additional risks and recommendations for perioperative complications:   - No identified additional risk factors other than previously addressed    Medication Instructions:  Patient is to take all scheduled medications on the day of surgery EXCEPT for modifications listed below:  hold vitamins 2 weeks prior to procedure    - ACE/ARB: May be continued on the day of surgery.    - Diuretics: HOLD on the day of surgery.   - Statins: Continue taking on the day of surgery.    - naproxen (Aleve, Naprosyn): HOLD 4 days before surgery.     RECOMMENDATION:  APPROVAL GIVEN to proceed with proposed procedure, without further diagnostic evaluation.    Ordering of each unique test  Prescription drug management  I spent a total of 22 minutes on the day of the visit.   Time spent doing chart review, history and exam, documentation and further activities per the note      Subjective     HPI related to upcoming procedure: pain and numbness into both hands       Preop  Questions 3/3/2023   1. Have you ever had a heart attack or stroke? No   2. Have you ever had surgery on your heart or blood vessels, such as a stent placement, a coronary artery bypass, or surgery on an artery in your head, neck, heart, or legs? No   3. Do you have chest pain with activity? No   4. Do you have a history of  heart failure? No   5. Do you currently have a cold, bronchitis or symptoms of other infection? No   6. Do you have a cough, shortness of breath, or wheezing? No   7. Do you or anyone in your family have previous history of blood clots? No - unknown -adopted   8. Do you or does anyone in your family have a serious bleeding problem such as prolonged bleeding following surgeries or cuts? No - unknown   9. Have you ever had problems with anemia or been told to take iron pills? No   10. Have you had any abnormal blood loss such as black, tarry or bloody stools, or abnormal vaginal bleeding? No   11. Have you ever had a blood transfusion? No   12. Are you willing to have a blood transfusion if it is medically needed before, during, or after your surgery? Yes   13. Have you or any of your relatives ever had problems with anesthesia? No   14. Do you have sleep apnea, excessive snoring or daytime drowsiness? No   15. Do you have any artifical heart valves or other implanted medical devices like a pacemaker, defibrillator, or continuous glucose monitor? No   16. Do you have artificial joints? No   17. Are you allergic to latex? YES: hives at site   18. Is there any chance that you may be pregnant? No     Health Care Directive:  Patient does not have a Health Care Directive or Living Will: Advance Directive received and scanned. Click on Code in the patient header to view.    Preoperative Review of :   reviewed - no record of controlled substances prescribed.      Status of Chronic Conditions:  See problem list for active medical problems.  Problems all longstanding and stable, except as  noted/documented.  See ROS for pertinent symptoms related to these conditions.    HYPERLIPIDEMIA - Patient has a long history of significant Hyperlipidemia requiring medication for treatment with recent good control. Patient reports no problems or side effects with the medication.     HYPERTENSION - Patient has longstanding history of HTN , currently denies any symptoms referable to elevated blood pressure. Specifically denies chest pain, palpitations, dyspnea, orthopnea, PND or peripheral edema. Blood pressure readings have been in normal range. Current medication regimen is as listed below. Patient denies any side effects of medication.       Review of Systems  CONSTITUTIONAL: NEGATIVE for fever, chills, change in weight  INTEGUMENTARY/SKIN: NEGATIVE for worrisome rashes, moles or lesions  EYES: NEGATIVE for vision changes or irritation  ENT/MOUTH: NEGATIVE for ear, mouth and throat problems  RESP: NEGATIVE for significant cough or SOB  CV: NEGATIVE for chest pain, palpitations or peripheral edema  GI: NEGATIVE for nausea, abdominal pain, heartburn, or change in bowel habits  : NEGATIVE for frequency, dysuria, or hematuria  MUSCULOSKELETAL:bialtaeral wrist  NEURO: bilateral hand numbness   ENDOCRINE: NEGATIVE for temperature intolerance, skin/hair changes  HEME: NEGATIVE for bleeding problems  PSYCHIATRIC: NEGATIVE for changes in mood or affect    Patient Active Problem List    Diagnosis Date Noted     Fecalith (H) 12/30/2020     Priority: Medium     Added automatically from request for surgery [id number]       S/P appy 11/29/2020     Priority: Medium     Postprocedural intraabdominal abscess 11/29/2020     Priority: Medium     Sepsis, due to unspecified organism, unspecified whether acute organ dysfunction present (H) 11/29/2020     Priority: Medium     Diarrhea, unspecified type 11/22/2020     Priority: Medium     Morbid obesity (H) 11/18/2019     Priority: Medium     Hyperlipidemia, unspecified hyperlipidemia  type 2019     Priority: Medium     Benign essential hypertension 2017     Priority: Medium     Anxiety 2017     Priority: Medium     Obesity, Class II, BMI 35-39.9, with comorbidity 2017     Priority: Medium      Past Medical History:   Diagnosis Date     Anxiety      Arthralgia     negative lyme, VINAY, RF, TSH, 2017     Arthritis      Benign essential hypertension      Postmenopausal bleeding     ? U/S & Endometrial biopsy done at [facility]?     Past Surgical History:   Procedure Laterality Date      tubal ligation Bilateral           SECTION      2     COLONOSCOPY      negative     EMG      Dr Fitzpatrick; mod-sev right CTS, mild-mod left CTS; moder left, mild right cubital tunnel     ENDOSCOPY      negative     IR SINOGRAM INJECTION DIAGNOSTIC  2020     IR SINOGRAM INJECTION DIAGNOSTIC  2020     IR SINOGRAM INJECTION DIAGNOSTIC  2020     LAPAROSCOPIC APPENDECTOMY N/A 2020    Procedure: APPENDECTOMY, LAPAROSCOPIC;  Surgeon: Keegan Burton DO;  [facility]: [facility]     LAPAROSCOPY DIAGNOSTIC (GENERAL) N/A 2021    Procedure: exploratory laparoscopy with lysis of adhesions, drainage of abscess;  Surgeon: Albert Fernandez MD;  Location: [facility]     Current Outpatient Medications   Medication Sig Dispense Refill     Calcium Citrate-Vitamin D (CALCIUM + D PO) Take 1 tablet by mouth daily        chlorthalidone (HYGROTON) 25 MG tablet Take 1 tablet (25 mg) by mouth daily 90 tablet 0     Cranberry 500 MG CAPS        fish oil-omega-3 fatty acids 1000 MG capsule Take 1 capsule by mouth daily       lisinopril (ZESTRIL) 20 MG tablet Take 1 tablet (20 mg) by mouth daily 90 tablet 0     multivitamin w/minerals (THERA-VIT-M) tablet Take 1 tablet by mouth daily       potassium chloride ER (K-TAB) 20 MEQ CR tablet TAKE 1 TABLET(20 MEQ) BY MOUTH DAILY 90 tablet 3     simvastatin (ZOCOR) 40 MG tablet TAKE ONE TABLET BY MOUTH DAILY AT BEDTIME 90 tablet  "0     vitamin C (ASCORBIC ACID) 1000 MG TABS        zinc gluconate 50 MG tablet          Allergies   Allergen Reactions     Adhesive Tape      bandaids - non cloth     Seasonal Allergies         Social History     Tobacco Use     Smoking status: Never     Smokeless tobacco: Never   Substance Use Topics     Alcohol use: Yes     Comment: occasionally, 2x/week     Family History   Family history unknown: Yes     History   Drug Use No         Objective     /64   Pulse 104   Temp 97.8  F (36.6  C) (Tympanic)   Ht 1.575 m (5' 2\")   Wt 96.6 kg (213 lb)   LMP 10/18/2017   SpO2 98%   BMI 38.96 kg/m      Physical Exam    GENERAL APPEARANCE: alert, active and obese     EYES: EOMI, PERRL     HENT: ear canals and TM's normal and nose and mouth without ulcers or lesions     NECK: no adenopathy, no asymmetry, masses, or scars and thyroid normal to palpation     RESP: lungs clear to auscultation - no rales, rhonchi or wheezes     CV: regular rates and rhythm, normal S1 S2, no S3 or S4 and no murmur, click or rub     ABDOMEN:  soft, nontender, no HSM or masses and bowel sounds normal     MS: extremities normal- no gross deformities noted, no evidence of inflammation in joints, FROM in all extremities.     SKIN: no suspicious lesions or rashes     NEURO: Normal strength and tone, sensory exam grossly normal, mentation intact and speech normal     PSYCH: mentation appears normal. and affect normal/bright     LYMPHATICS: No cervical adenopathy    Recent Labs   Lab Test 06/27/22  0818 05/21/21  0804     --    POTASSIUM 3.3* 4.1   CR 0.71  --         Diagnostics:  Recent Results (from the past 24 hour(s))   Basic metabolic panel    Collection Time: 03/03/23  1:58 PM   Result Value Ref Range    Sodium 144 136 - 145 mmol/L    Potassium 3.4 3.4 - 5.3 mmol/L    Chloride 103 98 - 107 mmol/L    Carbon Dioxide (CO2) 30 (H) 22 - 29 mmol/L    Anion Gap 11 7 - 15 mmol/L    Urea Nitrogen 15.1 6.0 - 20.0 mg/dL    Creatinine 0.78 " 0.51 - 0.95 mg/dL    Calcium 10.0 8.6 - 10.0 mg/dL    Glucose 104 (H) 70 - 99 mg/dL    GFR Estimate 88 >60 mL/min/1.73m2   CBC with platelets and differential    Collection Time: 03/03/23  1:58 PM   Result Value Ref Range    WBC Count 7.4 4.0 - 11.0 10e3/uL    RBC Count 4.15 3.80 - 5.20 10e6/uL    Hemoglobin 13.4 11.7 - 15.7 g/dL    Hematocrit 39.4 35.0 - 47.0 %    MCV 95 78 - 100 fL    MCH 32.3 26.5 - 33.0 pg    MCHC 34.0 31.5 - 36.5 g/dL    RDW 12.7 10.0 - 15.0 %    Platelet Count 299 150 - 450 10e3/uL    % Neutrophils 61 %    % Lymphocytes 29 %    % Monocytes 9 %    % Eosinophils 1 %    % Basophils 0 %    % Immature Granulocytes 0 %    NRBCs per 100 WBC 0 <1 /100    Absolute Neutrophils 4.5 1.6 - 8.3 10e3/uL    Absolute Lymphocytes 2.2 0.8 - 5.3 10e3/uL    Absolute Monocytes 0.7 0.0 - 1.3 10e3/uL    Absolute Eosinophils 0.1 0.0 - 0.7 10e3/uL    Absolute Basophils 0.0 0.0 - 0.2 10e3/uL    Absolute Immature Granulocytes 0.0 <=0.4 10e3/uL    Absolute NRBCs 0.0 10e3/uL      EKG: appears normal, NSR, with sinus arrhythmia     Revised Cardiac Risk Index (RCRI):  The patient has the following serious cardiovascular risks for perioperative complications:   - No serious cardiac risks = 0 points     RCRI Interpretation: 1 point: Class II (low risk - 0.9% complication rate)           Signed Electronically by: MIRZA Box CNP  Copy of this evaluation report is provided to requesting physician.

## 2023-03-02 NOTE — H&P (VIEW-ONLY)
[facility] [place]  [address] [address]  Phone: [phone]  Primary Provider: Anne Solorio  Pre-op Performing Provider: ANDREW NAVARRO      PREOPERATIVE EVALUATION:  Today's date: 3/3/2023    Rosalie Stewart is a 58 year old female who presents for a preoperative evaluation.    Surgical Information:  Surgery/Procedure: Bilateral CTR  Surgery Location: [facility] [place]  Surgeon: Dr. Palacios  Surgery Date: 3/20/23  Time of Surgery: TBD  Where patient plans to recover: At home with family  [facility]ax number for surgical facility: [facility]    Type of Anesthesia Anticipated: to be determined    Assessment & Plan     The proposed surgical procedure is considered LOW risk.    Preop general physical exam  Bilateral carpal tunnel syndrome  Cleared for procedure   - CBC with platelets and differential; Future  - Basic metabolic panel; Future  - EKG 12-lead complete w/read - (Clinic Performed)           Risks and Recommendations:  The patient has the following additional risks and recommendations for perioperative complications:   - No identified additional risk factors other than previously addressed    Medication Instructions:  Patient is to take all scheduled medications on the day of surgery EXCEPT for modifications listed below:  hold vitamins 2 weeks prior to procedure    - ACE/ARB: May be continued on the day of surgery.    - Diuretics: HOLD on the day of surgery.   - Statins: Continue taking on the day of surgery.    - naproxen (Aleve, Naprosyn): HOLD 4 days before surgery.     RECOMMENDATION:  APPROVAL GIVEN to proceed with proposed procedure, without further diagnostic evaluation.    Ordering of each unique test  Prescription drug management  I spent a total of 22 minutes on the day of the visit.   Time spent doing chart review, history and exam, documentation and further activities per the note      Subjective     HPI related to upcoming procedure: pain and numbness into both hands       Preop  Questions 3/3/2023   1. Have you ever had a heart attack or stroke? No   2. Have you ever had surgery on your heart or blood vessels, such as a stent placement, a coronary artery bypass, or surgery on an artery in your head, neck, heart, or legs? No   3. Do you have chest pain with activity? No   4. Do you have a history of  heart failure? No   5. Do you currently have a cold, bronchitis or symptoms of other infection? No   6. Do you have a cough, shortness of breath, or wheezing? No   7. Do you or anyone in your family have previous history of blood clots? No - unknown -adopted   8. Do you or does anyone in your family have a serious bleeding problem such as prolonged bleeding following surgeries or cuts? No - unknown   9. Have you ever had problems with anemia or been told to take iron pills? No   10. Have you had any abnormal blood loss such as black, tarry or bloody stools, or abnormal vaginal bleeding? No   11. Have you ever had a blood transfusion? No   12. Are you willing to have a blood transfusion if it is medically needed before, during, or after your surgery? Yes   13. Have you or any of your relatives ever had problems with anesthesia? No   14. Do you have sleep apnea, excessive snoring or daytime drowsiness? No   15. Do you have any artifical heart valves or other implanted medical devices like a pacemaker, defibrillator, or continuous glucose monitor? No   16. Do you have artificial joints? No   17. Are you allergic to latex? YES: hives at site   18. Is there any chance that you may be pregnant? No     Health Care Directive:  Patient does not have a Health Care Directive or Living Will: Advance Directive received and scanned. Click on Code in the patient header to view.    Preoperative Review of :   reviewed - no record of controlled substances prescribed.      Status of Chronic Conditions:  See problem list for active medical problems.  Problems all longstanding and stable, except as  noted/documented.  See ROS for pertinent symptoms related to these conditions.    HYPERLIPIDEMIA - Patient has a long history of significant Hyperlipidemia requiring medication for treatment with recent good control. Patient reports no problems or side effects with the medication.     HYPERTENSION - Patient has longstanding history of HTN , currently denies any symptoms referable to elevated blood pressure. Specifically denies chest pain, palpitations, dyspnea, orthopnea, PND or peripheral edema. Blood pressure readings have been in normal range. Current medication regimen is as listed below. Patient denies any side effects of medication.       Review of Systems  CONSTITUTIONAL: NEGATIVE for fever, chills, change in weight  INTEGUMENTARY/SKIN: NEGATIVE for worrisome rashes, moles or lesions  EYES: NEGATIVE for vision changes or irritation  ENT/MOUTH: NEGATIVE for ear, mouth and throat problems  RESP: NEGATIVE for significant cough or SOB  CV: NEGATIVE for chest pain, palpitations or peripheral edema  GI: NEGATIVE for nausea, abdominal pain, heartburn, or change in bowel habits  : NEGATIVE for frequency, dysuria, or hematuria  MUSCULOSKELETAL:bialtaeral wrist  NEURO: bilateral hand numbness   ENDOCRINE: NEGATIVE for temperature intolerance, skin/hair changes  HEME: NEGATIVE for bleeding problems  PSYCHIATRIC: NEGATIVE for changes in mood or affect    Patient Active Problem List    Diagnosis Date Noted     Fecalith (H) 12/30/2020     Priority: Medium     Added automatically from request for surgery [id number]       S/P appy 11/29/2020     Priority: Medium     Postprocedural intraabdominal abscess 11/29/2020     Priority: Medium     Sepsis, due to unspecified organism, unspecified whether acute organ dysfunction present (H) 11/29/2020     Priority: Medium     Diarrhea, unspecified type 11/22/2020     Priority: Medium     Morbid obesity (H) 11/18/2019     Priority: Medium     Hyperlipidemia, unspecified hyperlipidemia  type 2019     Priority: Medium     Benign essential hypertension 2017     Priority: Medium     Anxiety 2017     Priority: Medium     Obesity, Class II, BMI 35-39.9, with comorbidity 2017     Priority: Medium      Past Medical History:   Diagnosis Date     Anxiety      Arthralgia     negative lyme, VINAY, RF, TSH, 2017     Arthritis      Benign essential hypertension      Postmenopausal bleeding     ? U/S & [facility]?     Past Surgical History:   Procedure Laterality Date      tubal ligation Bilateral           SECTION      2     COLONOSCOPY      negative     EMG      Dr Fitzpatrick; mod-sev right CTS, mild-mod left CTS; moder left, mild right cubital tunnel     ENDOSCOPY      negative     IR SINOGRAM INJECTION DIAGNOSTIC  2020     IR SINOGRAM INJECTION DIAGNOSTIC  2020     IR SINOGRAM INJECTION DIAGNOSTIC  2020     LAPAROSCOPIC APPENDECTOMY N/A 2020    Procedure: APPENDECTOMY, LAPAROSCOPIC;  Surgeon: Keegan Burton DO;  Location: [facility]     LAPAROSCOPY DIAGNOSTIC (GENERAL) N/A 2021    Procedure: exploratory laparoscopy with lysis of adhesions, drainage of abscess;  Surgeon: Albert Fernandez MD;  Location: [facility] OR     Current Outpatient Medications   Medication Sig Dispense Refill     Calcium Citrate-Vitamin D (CALCIUM + D PO) Take 1 tablet by mouth daily        chlorthalidone (HYGROTON) 25 MG tablet Take 1 tablet (25 mg) by mouth daily 90 tablet 0     Cranberry 500 MG CAPS        fish oil-omega-3 fatty acids 1000 MG capsule Take 1 capsule by mouth daily       lisinopril (ZESTRIL) 20 MG tablet Take 1 tablet (20 mg) by mouth daily 90 tablet 0     multivitamin w/minerals (THERA-VIT-M) tablet Take 1 tablet by mouth daily       potassium chloride ER (K-TAB) 20 MEQ CR tablet TAKE 1 TABLET(20 MEQ) BY MOUTH DAILY 90 tablet 3     simvastatin (ZOCOR) 40 MG tablet TAKE ONE TABLET BY MOUTH DAILY AT BEDTIME 90 tablet  "0     vitamin C (ASCORBIC ACID) 1000 MG TABS        zinc gluconate 50 MG tablet          Allergies   Allergen Reactions     Adhesive Tape      bandaids - non cloth     Seasonal Allergies         Social History     Tobacco Use     Smoking status: Never     Smokeless tobacco: Never   Substance Use Topics     Alcohol use: Yes     Comment: occasionally, 2x/week     Family History   Family history unknown: Yes     History   Drug Use No         Objective     /64   Pulse 104   Temp 97.8  F (36.6  C) (Tympanic)   Ht 1.575 m (5' 2\")   Wt 96.6 kg (213 lb)   LMP 10/18/2017   SpO2 98%   BMI 38.96 kg/m      Physical Exam    GENERAL APPEARANCE: alert, active and obese     EYES: EOMI, PERRL     HENT: ear canals and TM's normal and nose and mouth without ulcers or lesions     NECK: no adenopathy, no asymmetry, masses, or scars and thyroid normal to palpation     RESP: lungs clear to auscultation - no rales, rhonchi or wheezes     CV: regular rates and rhythm, normal S1 S2, no S3 or S4 and no murmur, click or rub     ABDOMEN:  soft, nontender, no HSM or masses and bowel sounds normal     MS: extremities normal- no gross deformities noted, no evidence of inflammation in joints, FROM in all extremities.     SKIN: no suspicious lesions or rashes     NEURO: Normal strength and tone, sensory exam grossly normal, mentation intact and speech normal     PSYCH: mentation appears normal. and affect normal/bright     LYMPHATICS: No cervical adenopathy    Recent Labs   Lab Test 06/27/22  0818 05/21/21  0804     --    POTASSIUM 3.3* 4.1   CR 0.71  --         Diagnostics:  Recent Results (from the past 24 hour(s))   Basic metabolic panel    Collection Time: 03/03/23  1:58 PM   Result Value Ref Range    Sodium 144 136 - 145 mmol/L    Potassium 3.4 3.4 - 5.3 mmol/L    Chloride 103 98 - 107 mmol/L    Carbon Dioxide (CO2) 30 (H) 22 - 29 mmol/L    Anion Gap 11 7 - 15 mmol/L    Urea Nitrogen 15.1 6.0 - 20.0 mg/dL    Creatinine 0.78 " 0.51 - 0.95 mg/dL    Calcium 10.0 8.6 - 10.0 mg/dL    Glucose 104 (H) 70 - 99 mg/dL    GFR Estimate 88 >60 mL/min/1.73m2   CBC with platelets and differential    Collection Time: 03/03/23  1:58 PM   Result Value Ref Range    WBC Count 7.4 4.0 - 11.0 10e3/uL    RBC Count 4.15 3.80 - 5.20 10e6/uL    Hemoglobin 13.4 11.7 - 15.7 g/dL    Hematocrit 39.4 35.0 - 47.0 %    MCV 95 78 - 100 fL    MCH 32.3 26.5 - 33.0 pg    MCHC 34.0 31.5 - 36.5 g/dL    RDW 12.7 10.0 - 15.0 %    Platelet Count 299 150 - 450 10e3/uL    % Neutrophils 61 %    % Lymphocytes 29 %    % Monocytes 9 %    % Eosinophils 1 %    % Basophils 0 %    % Immature Granulocytes 0 %    NRBCs per 100 WBC 0 <1 /100    Absolute Neutrophils 4.5 1.6 - 8.3 10e3/uL    Absolute Lymphocytes 2.2 0.8 - 5.3 10e3/uL    Absolute Monocytes 0.7 0.0 - 1.3 10e3/uL    Absolute Eosinophils 0.1 0.0 - 0.7 10e3/uL    Absolute Basophils 0.0 0.0 - 0.2 10e3/uL    Absolute Immature Granulocytes 0.0 <=0.4 10e3/uL    Absolute NRBCs 0.0 10e3/uL      EKG: appears normal, NSR, with sinus arrhythmia     Revised Cardiac Risk Index (RCRI):  The patient has the following serious cardiovascular risks for perioperative complications:   - No serious cardiac risks = 0 points     RCRI Interpretation: 1 point: Class II (low risk - 0.9% complication rate)           Signed Electronically by: MIRZA Box CNP  Copy of this evaluation report is provided to requesting physician.

## 2023-03-03 ENCOUNTER — LAB (OUTPATIENT)
Dept: LAB | Facility: OTHER | Age: 58
End: 2023-03-03
Attending: NURSE PRACTITIONER
Payer: COMMERCIAL

## 2023-03-03 ENCOUNTER — OFFICE VISIT (OUTPATIENT)
Dept: FAMILY MEDICINE | Facility: OTHER | Age: 58
End: 2023-03-03
Attending: NURSE PRACTITIONER
Payer: COMMERCIAL

## 2023-03-03 VITALS
HEIGHT: 62 IN | TEMPERATURE: 97.8 F | SYSTOLIC BLOOD PRESSURE: 116 MMHG | DIASTOLIC BLOOD PRESSURE: 64 MMHG | OXYGEN SATURATION: 98 % | HEART RATE: 104 BPM | BODY MASS INDEX: 39.2 KG/M2 | WEIGHT: 213 LBS

## 2023-03-03 DIAGNOSIS — Z01.818 PREOP GENERAL PHYSICAL EXAM: Primary | ICD-10-CM

## 2023-03-03 DIAGNOSIS — Z01.818 PREOP GENERAL PHYSICAL EXAM: ICD-10-CM

## 2023-03-03 DIAGNOSIS — G56.03 BILATERAL CARPAL TUNNEL SYNDROME: ICD-10-CM

## 2023-03-03 LAB
ANION GAP SERPL CALCULATED.3IONS-SCNC: 11 MMOL/L (ref 7–15)
BASOPHILS # BLD AUTO: 0 10E3/UL (ref 0–0.2)
BASOPHILS NFR BLD AUTO: 0 %
BUN SERPL-MCNC: 15.1 MG/DL (ref 6–20)
CALCIUM SERPL-MCNC: 10 MG/DL (ref 8.6–10)
CHLORIDE SERPL-SCNC: 103 MMOL/L (ref 98–107)
CREAT SERPL-MCNC: 0.78 MG/DL (ref 0.51–0.95)
DEPRECATED HCO3 PLAS-SCNC: 30 MMOL/L (ref 22–29)
EOSINOPHIL # BLD AUTO: 0.1 10E3/UL (ref 0–0.7)
EOSINOPHIL NFR BLD AUTO: 1 %
ERYTHROCYTE [DISTWIDTH] IN BLOOD BY AUTOMATED COUNT: 12.7 % (ref 10–15)
GFR SERPL CREATININE-BSD FRML MDRD: 88 ML/MIN/1.73M2
GLUCOSE SERPL-MCNC: 104 MG/DL (ref 70–99)
HCT VFR BLD AUTO: 39.4 % (ref 35–47)
HGB BLD-MCNC: 13.4 G/DL (ref 11.7–15.7)
IMM GRANULOCYTES # BLD: 0 10E3/UL
IMM GRANULOCYTES NFR BLD: 0 %
LYMPHOCYTES # BLD AUTO: 2.2 10E3/UL (ref 0.8–5.3)
LYMPHOCYTES NFR BLD AUTO: 29 %
MCH RBC QN AUTO: 32.3 PG (ref 26.5–33)
MCHC RBC AUTO-ENTMCNC: 34 G/DL (ref 31.5–36.5)
MCV RBC AUTO: 95 FL (ref 78–100)
MONOCYTES # BLD AUTO: 0.7 10E3/UL (ref 0–1.3)
MONOCYTES NFR BLD AUTO: 9 %
NEUTROPHILS # BLD AUTO: 4.5 10E3/UL (ref 1.6–8.3)
NEUTROPHILS NFR BLD AUTO: 61 %
NRBC # BLD AUTO: 0 10E3/UL
NRBC BLD AUTO-RTO: 0 /100
PLATELET # BLD AUTO: 299 10E3/UL (ref 150–450)
POTASSIUM SERPL-SCNC: 3.4 MMOL/L (ref 3.4–5.3)
RBC # BLD AUTO: 4.15 10E6/UL (ref 3.8–5.2)
SODIUM SERPL-SCNC: 144 MMOL/L (ref 136–145)
WBC # BLD AUTO: 7.4 10E3/UL (ref 4–11)

## 2023-03-03 PROCEDURE — 85025 COMPLETE CBC W/AUTO DIFF WBC: CPT

## 2023-03-03 PROCEDURE — 80048 BASIC METABOLIC PNL TOTAL CA: CPT

## 2023-03-03 PROCEDURE — 36415 COLL VENOUS BLD VENIPUNCTURE: CPT

## 2023-03-03 PROCEDURE — 93000 ELECTROCARDIOGRAM COMPLETE: CPT | Mod: 77 | Performed by: INTERNAL MEDICINE

## 2023-03-03 PROCEDURE — 99213 OFFICE O/P EST LOW 20 MIN: CPT | Performed by: NURSE PRACTITIONER

## 2023-03-03 ASSESSMENT — PAIN SCALES - GENERAL: PAINLEVEL: MODERATE PAIN (4)

## 2023-03-03 NOTE — PATIENT INSTRUCTIONS
Hold vitamins 2 weeks before procedure   Hold Chlorthalidone day of the procedure   Hold aleve 4 days before procedure     For informational purposes only. Not to replace the advice of your health care provider. Copyright   ,  [facility]. All rights reserved. Clinically reviewed by Virginia Gibbs MD. [employer] [id number] - REV .  Preparing for Your Surgery  Getting started  A nurse will call you to review your health history and instructions. They will give you an arrival time based on your scheduled surgery time. Please be ready to share:  Your doctor's clinic name and phone number  Your medical, surgical, and anesthesia history  A list of allergies and sensitivities  A list of medicines, including herbal treatments and over-the-counter drugs  Whether the patient has a legal guardian (ask how to send us the papers in advance)  Please tell us if you're pregnant--or if there's any chance you might be pregnant. Some surgeries may injure a fetus (unborn baby), so they require a pregnancy test. Surgeries that are safe for a fetus don't always need a test, and you can choose whether to have one.   If you have a child who's having surgery, please ask for a copy of Preparing for Your Child's Surgery.    Preparing for surgery  Within 10 to 30 days of surgery: Have a pre-op exam (sometimes called an H&P, or History and Physical). This can be done at a clinic or pre-operative center.  If you're having a , you may not need this exam. Talk to your care team.  At your pre-op exam, talk to your care team about all medicines you take. If you need to stop any medicines before surgery, ask when to start taking them again.  We do this for your safety. Many medicines can make you bleed too much during surgery. Some change how well surgery (anesthesia) drugs work.  Call your insurance company to let them know you're having surgery. (If you don't have insurance, call [phone].)  Call your clinic if  there's any change in your health. This includes signs of a cold or flu (sore throat, runny nose, cough, rash, fever). It also includes a scrape or scratch near the surgery site.  If you have questions on the day of surgery, call your hospital or surgery center.  Eating and drinking guidelines  For your safety: Unless your surgeon tells you otherwise, follow the guidelines below.  Eat and drink as usual until 8 hours before you arrive for surgery. After that, no food or milk.  Drink clear liquids until 2 hours before you arrive. These are liquids you can see through, like water, Gatorade, and Propel Water. They also include plain black coffee and tea (no cream or milk), candy, and breath mints. You can spit out gum when you arrive.  If you drink alcohol: Stop drinking it the night before surgery.  If your care team tells you to take medicine on the morning of surgery, it's okay to take it with a sip of water.  Preventing infection  Shower or bathe the night before and morning of your surgery. Follow the instructions your clinic gave you. (If no instructions, use regular soap.)  Don't shave or clip hair near your surgery site. We'll remove the hair if needed.  Don't smoke or vape the morning of surgery. You may chew nicotine gum up to 2 hours before surgery. A nicotine patch is okay.  Note: Some surgeries require you to completely quit smoking and nicotine. Check with your surgeon.  Your care team will make every effort to keep you safe from infection. We will:  Clean our hands often with soap and water (or an alcohol-based hand rub).  Clean the skin at your surgery site with a special soap that kills germs.  Give you a special gown to keep you warm. (Cold raises the risk of infection.)  Wear special hair covers, masks, gowns and gloves during surgery.  Give antibiotic medicine, if prescribed. Not all surgeries need antibiotics.  What to bring on the day of surgery  Photo ID and insurance card  Copy of your health  care directive, if you have one  Glasses and hearing aids (bring cases)  You can't wear contacts during surgery  Inhaler and eye drops, if you use them (tell us about these when you arrive)  CPAP machine or breathing device, if you use them  A few personal items, if spending the night  If you have . . .  A pacemaker, ICD (cardiac defibrillator) or other implant: Bring the ID card.  An implanted stimulator: Bring the remote control.  A legal guardian: Bring a copy of the certified (court-stamped) guardianship papers.  Please remove any jewelry, including body piercings. Leave jewelry and other valuables at home.  If you're going home the day of surgery  You must have a responsible adult drive you home. They should stay with you overnight as well.  If you don't have someone to stay with you, and you aren't safe to go home alone, we may keep you overnight. Insurance often won't pay for this.  After surgery  If it's hard to control your pain or you need more pain medicine, please call your surgeon's office.  Questions?   If you have any questions for your care team, list them here: _________________________________________________________________________________________________________________________________________________________________________ ____________________________________ ____________________________________ ____________________________________

## 2023-03-10 DIAGNOSIS — I10 BENIGN ESSENTIAL HYPERTENSION: ICD-10-CM

## 2023-03-10 DIAGNOSIS — E78.5 HYPERLIPIDEMIA, UNSPECIFIED HYPERLIPIDEMIA TYPE: ICD-10-CM

## 2023-03-11 NOTE — NURSING NOTE
EMERGENCY DEPARTMENT ENCOUNTER    Room Number:  16/16  Date of encounter:  3/11/2023  PCP: Neda Weinberg APRN  Patient Care Team:  Neda Weinberg [facility])    •  Patient presented to the clinic for her dressing change to her abdominal wound.  The dressing was removed from the wound and the packing was removed from the wound without difficulty.  The skin surrounding the wound was cleansed with water and baby soap.  The wound was measured and is 1.5 cm deep.  There is no sign of infection.  The wound was then repacked with 1/4 inch iodoform packing.  The wound was covered with a 4x4 folded in quarters and taped into place over the wound.  The other site on the patient's right upper abdomen was dressed also with a 4x4 and taped in place. The patient will return to clinic on 12/17/20 for another dressing change. Milagros Wheeler LPN   Mal Raymundo MD       I interpreted the cardiac monitor rhythm and my independent interpretation is: normal sinus rhythm.     PPE: The patient wore a mask and I wore an N95 mask throughout the entire patient encounter.       AS OF 05:00 EST VITALS:    BP -  103/68  HR - 98  TEMP - 99 °F (37.2 °C) (Tympanic)  O2 SATS - 93%        DIAGNOSIS  Final diagnoses:   Strain of lumbar region, initial encounter   Sciatica of left side         DISPOSITION  ED Disposition     ED Disposition   Discharge    Condition   Stable    Comment   --                Note Disclaimer: At [facility], we believe that sharing information builds trust and better relationships. You are receiving this note because you recently visited [facility]. It is possible you will see health information before a provider has talked with you about it. This kind of information can be easy to misunderstand. To help you fully understand what it means for your health, we urge you to discuss this note with your provider.       Mal Raymundo MD  03/11/23 9204

## 2023-03-13 ENCOUNTER — ANESTHESIA EVENT (OUTPATIENT)
Dept: SURGERY | Facility: HOSPITAL | Age: 58
End: 2023-03-13
Payer: COMMERCIAL

## 2023-03-13 RX ORDER — SIMVASTATIN 40 MG
TABLET ORAL
Qty: 90 TABLET | Refills: 3 | Status: SHIPPED | OUTPATIENT
Start: 2023-03-13 | End: 2023-11-13

## 2023-03-13 RX ORDER — CHLORTHALIDONE 25 MG/1
25 TABLET ORAL DAILY
Qty: 90 TABLET | Refills: 3 | Status: SHIPPED | OUTPATIENT
Start: 2023-03-13 | End: 2024-02-28

## 2023-03-13 RX ORDER — LISINOPRIL 20 MG/1
20 TABLET ORAL DAILY
Qty: 90 TABLET | Refills: 3 | Status: SHIPPED | OUTPATIENT
Start: 2023-03-13 | End: 2024-02-28

## 2023-03-13 NOTE — TELEPHONE ENCOUNTER
zocor       Last Written Prescription Date:  11-3-22  Last Fill Quantity: 90,   # refills: 0  Last Office Visit: 3-3-23  Future Office visit:       Lisinopril       Last Written Prescription Date:  11-3-22  Last Fill Quantity: 90,   # refills: 0    Chlorthalidone       Last Written Prescription Date:  11-3-22  Last Fill Quantity: 90,   # refills: 0

## 2023-03-13 NOTE — ANESTHESIA PREPROCEDURE EVALUATION
Anesthesia Pre-Procedure Evaluation    Patient: Rosalie Stewart   MRN: [id number] : 1965        Procedure : Procedure(s):  Bilateral Carpal Tunnel Release  Left Cubital Tunnel Release          Past Medical History:   Diagnosis Date     Anxiety      Arthralgia     negative lyme, VINAY, RF, TSH, 2017     Arthritis      Benign essential hypertension      Postmenopausal bleeding     ? U/S & Endometrial biopsy done at [facility]?      Past Surgical History:   Procedure Laterality Date      tubal ligation Bilateral           SECTION      2     COLONOSCOPY      negative     EMG      Dr Fitzpatrick; mod-sev right CTS, mild-mod left CTS; moder left, mild right cubital tunnel     ENDOSCOPY      negative     IR SINOGRAM INJECTION DIAGNOSTIC  2020     IR SINOGRAM INJECTION DIAGNOSTIC  2020     IR SINOGRAM INJECTION DIAGNOSTIC  2020     LAPAROSCOPIC APPENDECTOMY N/A 2020    Procedure: APPENDECTOMY, LAPAROSCOPIC;  Surgeon: Keegan Burton DO;  Location: [facility] OR     LAPAROSCOPY DIAGNOSTIC (GENERAL) N/A 2021    Procedure: exploratory laparoscopy with lysis of adhesions, drainage of abscess;  Surgeon: Albert Fernandez MD;  Location: [facility] OR      Allergies   Allergen Reactions     Adhesive Tape      bandaids - non cloth     Seasonal Allergies       Social History     Tobacco Use     Smoking status: Never     Smokeless tobacco: Never   Substance Use Topics     Alcohol use: Yes     Comment: occasionally, 2x/week      Wt Readings from Last 1 Encounters:   23 96.6 kg (213 lb)        Anesthesia Evaluation   Pt has had prior anesthetic. Type: MAC and General.    No history of anesthetic complications       ROS/MED HX  ENT/Pulmonary:     (+) ZUNILDA risk factors, snores loudly, hypertension, obese,     Neurologic:  - neg neurologic ROS     Cardiovascular:     (+) Dyslipidemia hypertension-----Previous cardiac testing   Echo: Date: Results:    Stress Test: Date:  Results:    ECG Reviewed: Date: preop Results:  NSR with sinus arrhythmia   Cath: Date: Results:      METS/Exercise Tolerance: >4 METS    Hematologic: Comments: Hypokalemia (stable on last draw)      Musculoskeletal: Comment: Arthralgia   (+) arthritis (denies pain today),     GI/Hepatic:  - neg GI/hepatic ROS     Renal/Genitourinary:  - neg Renal ROS     Endo:     (+) Obesity,     Psychiatric/Substance Use:     (+) psychiatric history anxiety Recreational drug usage: Cannabis (has not smoked in a few weeks).    Infectious Disease:  - neg infectious disease ROS     Malignancy:  - neg malignancy ROS     Other:  - neg other ROS          Physical Exam    Airway        Mallampati: II   TM distance: > 3 FB   Neck ROM: full   Mouth opening: > 3 cm    Respiratory Devices and Support         Dental       (+) Modest Abnormalities - crowns, retainers, 1 or 2 missing teeth      Cardiovascular   cardiovascular exam normal       Rhythm and rate: regular and normal     Pulmonary   pulmonary exam normal        breath sounds clear to auscultation           OUTSIDE LABS:  CBC:   Lab Results   Component Value Date    WBC 7.4 03/03/2023    WBC 11.1 (H) 12/06/2020    HGB 13.4 03/03/2023    HGB 10.9 (L) 12/06/2020    HCT 39.4 03/03/2023    HCT 33.2 (L) 12/06/2020     03/03/2023     (H) 12/06/2020     BMP:   Lab Results   Component Value Date     03/03/2023     06/27/2022    POTASSIUM 3.4 03/03/2023    POTASSIUM 3.3 (L) 06/27/2022    CHLORIDE 103 03/03/2023    CHLORIDE 105 06/27/2022    CO2 30 (H) 03/03/2023    CO2 31 06/27/2022    BUN 15.1 03/03/2023    BUN 10 06/27/2022    CR 0.78 03/03/2023    CR 0.71 06/27/2022     (H) 03/03/2023    GLC 98 06/27/2022     COAGS:   Lab Results   Component Value Date    PTT 33 11/29/2020    INR 1.06 11/29/2020     POC:   Lab Results   Component Value Date    HCG Negative 11/22/2020     HEPATIC:   Lab Results   Component Value Date    ALBUMIN 3.8 06/27/2022    PROTTOTAL  7.7 06/27/2022    ALT 54 (H) 06/27/2022    AST 35 06/27/2022    ALKPHOS 80 06/27/2022    BILITOTAL 0.4 06/27/2022     OTHER:   Lab Results   Component Value Date    LACT 1.0 11/29/2020    ANGELA 10.0 03/03/2023    LIPASE 55 (L) 11/29/2020    TSH 2.14 01/17/2018    CRP 4.2 11/18/2019       Anesthesia Plan    ASA Status:  2   NPO Status:  NPO Appropriate    Anesthesia Type: General.     - Airway: LMA              Consents    Anesthesia Plan(s) and associated risks, benefits, and realistic alternatives discussed. Questions answered and patient/representative(s) expressed understanding.    - Discussed:     - Discussed with:  Patient         Postoperative Care            Comments:    Other Comments:  3/3/23 [facility]    Discussed risks and benefits with patient for general anesthesia including sore throat, nausea, vomiting, aspiration, dental damage, loss of airway, CV complications, stroke, MI, death. Pt wishes to proceed.             MIRZA MURO CRNA

## 2023-03-20 ENCOUNTER — HOSPITAL ENCOUNTER (OUTPATIENT)
Facility: HOSPITAL | Age: 58
Discharge: HOME | End: 2023-03-20
Attending: ORTHOPAEDIC SURGERY | Admitting: ORTHOPAEDIC SURGERY
Payer: COMMERCIAL

## 2023-03-20 ENCOUNTER — ANESTHESIA (OUTPATIENT)
Dept: SURGERY | Facility: HOSPITAL | Age: 58
End: 2023-03-20
Payer: COMMERCIAL

## 2023-03-20 VITALS
SYSTOLIC BLOOD PRESSURE: 160 MMHG | WEIGHT: 213 LBS | HEIGHT: 62 IN | DIASTOLIC BLOOD PRESSURE: 86 MMHG | BODY MASS INDEX: 39.2 KG/M2 | RESPIRATION RATE: 16 BRPM | OXYGEN SATURATION: 96 % | TEMPERATURE: 98.9 F | HEART RATE: 79 BPM

## 2023-03-20 DIAGNOSIS — G56.03 CARPAL TUNNEL SYNDROME, BILATERAL: Primary | ICD-10-CM

## 2023-03-20 PROCEDURE — 250N000011 HC RX IP 250 OP 636: Performed by: ORTHOPAEDIC SURGERY

## 2023-03-20 PROCEDURE — 710N000012 HC RECOVERY PHASE 2, PER MINUTE: Performed by: ORTHOPAEDIC SURGERY

## 2023-03-20 PROCEDURE — 999N000141 HC STATISTIC PRE-PROCEDURE NURSING ASSESSMENT: Performed by: ORTHOPAEDIC SURGERY

## 2023-03-20 PROCEDURE — 250N000009 HC RX 250: Performed by: NURSE ANESTHETIST, CERTIFIED REGISTERED

## 2023-03-20 PROCEDURE — 360N000075 HC SURGERY LEVEL 2, PER MIN: Performed by: ORTHOPAEDIC SURGERY

## 2023-03-20 PROCEDURE — 64721 CARPAL TUNNEL SURGERY: CPT | Performed by: NURSE ANESTHETIST, CERTIFIED REGISTERED

## 2023-03-20 PROCEDURE — 64718 REVISE ULNAR NERVE AT ELBOW: CPT | Mod: LT | Performed by: ORTHOPAEDIC SURGERY

## 2023-03-20 PROCEDURE — 250N000013 HC RX MED GY IP 250 OP 250 PS 637

## 2023-03-20 PROCEDURE — 250N000011 HC RX IP 250 OP 636: Performed by: NURSE ANESTHETIST, CERTIFIED REGISTERED

## 2023-03-20 PROCEDURE — 370N000017 HC ANESTHESIA TECHNICAL FEE, PER MIN: Performed by: ORTHOPAEDIC SURGERY

## 2023-03-20 PROCEDURE — 272N000001 HC OR GENERAL SUPPLY STERILE: Performed by: ORTHOPAEDIC SURGERY

## 2023-03-20 PROCEDURE — 64721 CARPAL TUNNEL SURGERY: CPT | Mod: 50 | Performed by: ORTHOPAEDIC SURGERY

## 2023-03-20 PROCEDURE — 710N000010 HC RECOVERY PHASE 1, LEVEL 2, PER MIN: Performed by: ORTHOPAEDIC SURGERY

## 2023-03-20 PROCEDURE — 250N000025 HC SEVOFLURANE, PER MIN: Performed by: ORTHOPAEDIC SURGERY

## 2023-03-20 PROCEDURE — 258N000003 HC RX IP 258 OP 636: Performed by: NURSE ANESTHETIST, CERTIFIED REGISTERED

## 2023-03-20 PROCEDURE — 250N000011 HC RX IP 250 OP 636

## 2023-03-20 RX ORDER — FENTANYL CITRATE 50 UG/ML
INJECTION, SOLUTION INTRAMUSCULAR; INTRAVENOUS PRN
Status: DISCONTINUED | OUTPATIENT
Start: 2023-03-20 | End: 2023-03-20

## 2023-03-20 RX ORDER — BUPIVACAINE HYDROCHLORIDE 2.5 MG/ML
INJECTION, SOLUTION EPIDURAL; INFILTRATION; INTRACAUDAL
Status: DISCONTINUED
Start: 2023-03-20 | End: 2023-03-20 | Stop reason: HOSPADM

## 2023-03-20 RX ORDER — PROPOFOL 10 MG/ML
INJECTION, EMULSION INTRAVENOUS PRN
Status: DISCONTINUED | OUTPATIENT
Start: 2023-03-20 | End: 2023-03-20

## 2023-03-20 RX ORDER — CEFAZOLIN SODIUM/WATER 2 G/20 ML
2 SYRINGE (ML) INTRAVENOUS
Status: COMPLETED | OUTPATIENT
Start: 2023-03-20 | End: 2023-03-20

## 2023-03-20 RX ORDER — OXYCODONE HYDROCHLORIDE 5 MG/1
5-10 TABLET ORAL EVERY 4 HOURS PRN
Qty: 12 TABLET | Refills: 0 | Status: SHIPPED | OUTPATIENT
Start: 2023-03-20 | End: 2023-11-13

## 2023-03-20 RX ORDER — DEXAMETHASONE SODIUM PHOSPHATE 4 MG/ML
INJECTION, SOLUTION INTRA-ARTICULAR; INTRALESIONAL; INTRAMUSCULAR; INTRAVENOUS; SOFT TISSUE PRN
Status: DISCONTINUED | OUTPATIENT
Start: 2023-03-20 | End: 2023-03-20

## 2023-03-20 RX ORDER — KETAMINE HYDROCHLORIDE 10 MG/ML
INJECTION INTRAMUSCULAR; INTRAVENOUS PRN
Status: DISCONTINUED | OUTPATIENT
Start: 2023-03-20 | End: 2023-03-20

## 2023-03-20 RX ORDER — OXYCODONE HYDROCHLORIDE 5 MG/1
5 TABLET ORAL
Status: COMPLETED | OUTPATIENT
Start: 2023-03-20 | End: 2023-03-20

## 2023-03-20 RX ORDER — LIDOCAINE HYDROCHLORIDE 20 MG/ML
INJECTION, SOLUTION INFILTRATION; PERINEURAL PRN
Status: DISCONTINUED | OUTPATIENT
Start: 2023-03-20 | End: 2023-03-20

## 2023-03-20 RX ORDER — PROPOFOL 10 MG/ML
INJECTION, EMULSION INTRAVENOUS CONTINUOUS PRN
Status: DISCONTINUED | OUTPATIENT
Start: 2023-03-20 | End: 2023-03-20

## 2023-03-20 RX ORDER — ACETAMINOPHEN 325 MG/1
650 TABLET ORAL
Status: DISCONTINUED | OUTPATIENT
Start: 2023-03-20 | End: 2023-03-20 | Stop reason: HOSPADM

## 2023-03-20 RX ORDER — ONDANSETRON 2 MG/ML
INJECTION INTRAMUSCULAR; INTRAVENOUS PRN
Status: DISCONTINUED | OUTPATIENT
Start: 2023-03-20 | End: 2023-03-20

## 2023-03-20 RX ORDER — SODIUM CHLORIDE, SODIUM LACTATE, POTASSIUM CHLORIDE, CALCIUM CHLORIDE 600; 310; 30; 20 MG/100ML; MG/100ML; MG/100ML; MG/100ML
INJECTION, SOLUTION INTRAVENOUS CONTINUOUS PRN
Status: DISCONTINUED | OUTPATIENT
Start: 2023-03-20 | End: 2023-03-20

## 2023-03-20 RX ORDER — BUPIVACAINE HYDROCHLORIDE 2.5 MG/ML
INJECTION, SOLUTION INFILTRATION; PERINEURAL PRN
Status: DISCONTINUED | OUTPATIENT
Start: 2023-03-20 | End: 2023-03-20 | Stop reason: HOSPADM

## 2023-03-20 RX ADMIN — Medication 30 MG: at 12:12

## 2023-03-20 RX ADMIN — FENTANYL CITRATE 25 MCG: 50 INJECTION, SOLUTION INTRAMUSCULAR; INTRAVENOUS at 12:33

## 2023-03-20 RX ADMIN — Medication 20 MG: at 12:33

## 2023-03-20 RX ADMIN — Medication 2 G: at 12:00

## 2023-03-20 RX ADMIN — SODIUM CHLORIDE, POTASSIUM CHLORIDE, SODIUM LACTATE AND CALCIUM CHLORIDE: 600; 310; 30; 20 INJECTION, SOLUTION INTRAVENOUS at 11:52

## 2023-03-20 RX ADMIN — PROPOFOL 100 MCG/KG/MIN: 10 INJECTION, EMULSION INTRAVENOUS at 12:21

## 2023-03-20 RX ADMIN — FENTANYL CITRATE 25 MCG: 50 INJECTION, SOLUTION INTRAMUSCULAR; INTRAVENOUS at 12:30

## 2023-03-20 RX ADMIN — LIDOCAINE HYDROCHLORIDE 80 MG: 20 INJECTION, SOLUTION INFILTRATION; PERINEURAL at 12:11

## 2023-03-20 RX ADMIN — DEXAMETHASONE SODIUM PHOSPHATE 8 MG: 4 INJECTION, SOLUTION INTRA-ARTICULAR; INTRALESIONAL; INTRAMUSCULAR; INTRAVENOUS; SOFT TISSUE at 12:27

## 2023-03-20 RX ADMIN — FENTANYL CITRATE 50 MCG: 50 INJECTION, SOLUTION INTRAMUSCULAR; INTRAVENOUS at 12:22

## 2023-03-20 RX ADMIN — ONDANSETRON 4 MG: 2 INJECTION INTRAMUSCULAR; INTRAVENOUS at 12:48

## 2023-03-20 RX ADMIN — OXYCODONE HYDROCHLORIDE 5 MG: 5 TABLET ORAL at 13:58

## 2023-03-20 RX ADMIN — MIDAZOLAM 2 MG: 1 INJECTION INTRAMUSCULAR; INTRAVENOUS at 12:00

## 2023-03-20 RX ADMIN — PROPOFOL 200 MG: 10 INJECTION, EMULSION INTRAVENOUS at 12:11

## 2023-03-20 ASSESSMENT — ACTIVITIES OF DAILY LIVING (ADL)
ADLS_ACUITY_SCORE: 35

## 2023-03-20 NOTE — OP NOTE
Procedure Date: 03/20/2023    PREOPERATIVE DIAGNOSES:     1.  Left carpal tunnel syndrome.  2.  Left cubital tunnel syndrome.  3.  Right carpal tunnel syndrome.    POSTOPERATIVE DIAGNOSES:     1.  Left carpal tunnel syndrome.  2.  Left cubital tunnel syndrome.  3.  Right carpal tunnel syndrome.    PROCEDURE PERFORMED:     1.  Left carpal tunnel release.  2.  Left cubital tunnel release.  3.  Right carpal tunnel release.    SURGEON:  Forrest Palacios MD.    ASSISTANT:  Cornelius Arora PA-C.   A surgical assistant was used to position and assist with exposing the joint and assisting in closure postoperatively.    ANESTHESIA:    1.  General by LMA.  2.  Local injection.    FINDINGS:     1.  Thickened transverse carpal ligaments bilaterally.  2.  Satisfactory ulnar nerve decompression at the elbow without subluxation with elbow flexion and extension.  3.  Adequate hemostasis.    IMPLANTS:  None.    SPECIMENS:  None.    DRAINS:  None.    COMPLICATIONS:  None.    TOURNIQUET TIME:  12 minutes on the left and 3 minutes and the right.    ESTIMATED BLOOD LOSS:  10 mL.    INDICATIONS FOR PROCEDURE:  The patient is a 58-year-old female who has had EMG evidence and physical exam evidence of bilateral carpal tunnel syndrome as well as left cubital tunnel syndrome.  Discussed with her proceeding with operative release given her failure of nonoperative management.  Risks and benefits were discussed.  Consent was obtained.    DESCRIPTION OF PROCEDURE:  The patient was seen in the preoperative area.  Surgical site was marked, questions answered, taken to the operating room and placed under general anesthesia.  Both upper extremities were prepped and draped in a sterile fashion with ChloraPrep.  A timeout was called to identify the correct patient and correct surgical site.  I started with the left upper extremity.  The limb was elevated and exsanguinated, tourniquet inflated to 250 mmHg.   I then made a 2 cm incision overlying the carpal  tunnel.  Dissection was carried down through the skin and subcutaneous tissue to identify the palmar fascia that was incised.  The transverse carpal ligament was then identified at the mid portion and it was incised.  Release was carried distally and then proximally until it was completely released.  The nerve was in continuity.  No masses or excessive fluid.    I then turned my attention to the left cubital tunnel release.  A 3 to 4 cm incision overlying the medial epicondyle was made.  Dissection was carried down through the skin and subcutaneous tissue to identify the fascia just posterior to the medial epicondyle.  I incised this sharply with a knife and this revealed the ulnar nerve.  The dissection was carried distally to the flexor carpi ulnaris and some of the flexor carpi ulnaris fascia was released to prevent constriction at this location.  I then released it proximally into the triceps.  The nerve was in continuity.  The elbow was taken through flexion, extension and did not sublux.  Care was taken not to overly mobilized the nerve and devascularize it.  The tourniquet was deflated.  Hemostasis was achieved.  The carpal tunnel incision was closed with a 3-0 nylon suture.  The cubital tunnel incision was closed with a 2-0 Vicryl and 3-0 Monocryl.    Attention then turned to the right carpal tunnel release. The limb was elevated and exsanguinated, tourniquet inflated to 250 mmHg. I made a 2 cm incision overlying the carpal tunnel, dissected down through the skin, subcutaneous tissue, to identify the transverse carpal ligament.  The ligament was incised in the midportion.  Dissection was carried distally and proximally until a complete release was obtained.  The nerve was in continuity.  No masses or lesions.  The tourniquet was deflated.  Hemostasis was achieved.  Incision was closed with 3-0 nylon and sterile Tegaderm dressings were applied.  She was awakened, extubated, and transferred to PACU in stable  condition.    POSTOPERATIVE PLAN:  The patient will be in the dressings for 3 to 4 days, then she can remove them and shower.  She will follow up in 2 weeks for a wound check and suture removal.    Forrest Palacios MD        D: 2023   T: 2023   MT: LYUDMILA    Name:     JOANNE RODRIGUES  MRN:      [id number]        Account:        [id number]   :      1965           Procedure Date: 2023     Document: [id number]

## 2023-03-20 NOTE — INTERVAL H&P NOTE
"I have reviewed the surgical (or preoperative) H&P that is linked to this encounter, and examined the patient. There are no significant changes    Clinical Conditions Present on Arrival:  Clinically Significant Risk Factors Present on Admission                    # Obesity: Estimated body mass index is 38.96 kg/m  as calculated from the following:    Height as of this encounter: 1.575 m (5' 2\").    Weight as of this encounter: 96.6 kg (213 lb).       "

## 2023-03-20 NOTE — ANESTHESIA POSTPROCEDURE EVALUATION
Patient: Rosalie Stewart    Procedure: Procedure(s):  Bilateral Carpal Tunnel Release  Left Cubital Tunnel Release       Anesthesia Type:  General    Note:  Disposition: Outpatient   Postop Pain Control: Uneventful            Sign Out: Well controlled pain   PONV: No   Neuro/Psych: Uneventful            Sign Out: Acceptable/Baseline neuro status   Airway/Respiratory: Uneventful            Sign Out: Acceptable/Baseline resp. status   CV/Hemodynamics: Uneventful            Sign Out: Acceptable CV status; No obvious hypovolemia; No obvious fluid overload   Other NRE: NONE   DID A NON-ROUTINE EVENT OCCUR? No           Last vitals:  Vitals Value Taken Time   /73 03/20/23 1335   Temp 97.6  F (36.4  C) 03/20/23 1314   Pulse 75 03/20/23 1336   Resp 5 03/20/23 1337   SpO2 96 % 03/20/23 1340   Vitals shown include unvalidated device data.    Electronically Signed By: MIRZA Mitchell CRNA  March 20, 2023  2:10 PM

## 2023-03-20 NOTE — BRIEF OP NOTE
[facility]    Brief Operative Note    Pre-operative diagnosis: Carpal tunnel syndrome, bilateral [G56.03]  Cubital tunnel syndrome, left [G56.22]  Post-operative diagnosis Same as pre-operative diagnosis    Procedure: Procedure(s):  Bilateral Carpal Tunnel Release  Left Cubital Tunnel Release  Surgeon: Surgeon(s) and Role:     * Forrest Palacios MD - Primary     * Eduardo Arora PA-C - Assisting  Anesthesia: General   Estimated Blood Loss: Less than 10 ml    Drains: None  Specimens: * No specimens in log *  Findings:   None.  Complications: None.  Implants: * No implants in log *      [id number]

## 2023-03-20 NOTE — OR NURSING
Writer spoke to ANGEL Shields on the phone regarding the patient's sling, states to educate the patient on removing the sling on post op day one or two, sling for comfort and patient can move her elbow as able and tolerating. Will pass onto the patient at discharge.

## 2023-03-20 NOTE — OR NURSING
Patient and responsible adult (Jonas, friend) given discharge instructions with no questions regarding instructions. Ron score 19/20. Pain level 3/10.  Discharged from unit via ambulation, declined wheelchair. Patient discharged to home with Jonas, friend. Denies any further questions or concerns, aware of what to monitor for and OA triage line if needing further assistance or to come in and be seen as needed. Patient was able to dress herself with minimal assistance, aware she can remove the brace after 1st or 2nd day per ANGEL Shields.

## 2023-03-20 NOTE — ANESTHESIA CARE TRANSFER NOTE
Patient: Rosalie Stewart    Procedure: Procedure(s):  Bilateral Carpal Tunnel Release  Left Cubital Tunnel Release       Diagnosis: Carpal tunnel syndrome, bilateral [G56.03]  Cubital tunnel syndrome, left [G56.22]  Diagnosis Additional Information: No value filed.    Anesthesia Type:   General     Note:    Oropharynx: oropharynx clear of all foreign objects and spontaneously breathing  Level of Consciousness: drowsy  Oxygen Supplementation: nasal cannula  Level of Supplemental Oxygen (L/min / FiO2): 2  Independent Airway: airway patency satisfactory and stable  Dentition: dentition unchanged  Vital Signs Stable: post-procedure vital signs reviewed and stable  Report to RN Given: handoff report given  Patient transferred to: Phase II    Handoff Report: Identifed the Patient, Identified the Reponsible Provider, Reviewed the pertinent medical history, Discussed the surgical course, Reviewed Intra-OP anesthesia mangement and issues during anesthesia, Set expectations for post-procedure period and Allowed opportunity for questions and acknowledgement of understanding      Vitals:  Vitals Value Taken Time   /76 03/20/23 1315   Temp     Pulse 87 03/20/23 1317   Resp 12 03/20/23 1317   SpO2 97 % 03/20/23 1317   Vitals shown include unvalidated device data.    Electronically Signed By: MIRZA Hernandez CRNA  March 20, 2023  1:18 PM

## 2023-03-20 NOTE — INTERVAL H&P NOTE
"I have reviewed the surgical (or preoperative) H&P that is linked to this encounter, and examined the patient. There are no significant changes    Clinical Conditions Present on Arrival:  Clinically Significant Risk Factors Present on Admission                    # Obesity: Estimated body mass index is 38.96 kg/m  as calculated from the following:    Height as of 3/3/23: 1.575 m (5' 2\").    Weight as of 3/3/23: 96.6 kg (213 lb).       "

## 2023-03-20 NOTE — OR NURSING
1416: Spoke to JAHAIRA Zuluaga on call regarding the patient's elevated blood pressures, aware and ok with the elevation at this time. No further orders and will continue to assess and monitor and update as needed.

## 2023-03-20 NOTE — DISCHARGE INSTRUCTIONS
If you have any questions or concerns, please call the [facility] Triage Line at [phone].    Follow up on 4/4/2023 at 2:45 pm at [facility].     CARPAL TUNNEL RELEASE   POST-OP CARE INSTRUCTIONS     Please read the instructions outlined below. Refer to these instructions for the next few weeks. These discharge instructions provide you with general information on caring for yourself after surgery. Your caregiver may also give you more detailed instructions. If you have any problems or questions after discharge, please call your caregiver.     POSTOPERATIVE VISIT:  *You were set up with a postoperative visit with the orthopedics department approximately 2-weeks after your procedure. Your appointment will be listed on your discharge summary today - and you will see either Dr. Yun and/or Amy ORTIZ in the office.     AFTER THE PROCEDURE IT IS COMMON TO EXPERIENCE   *Numbness in your hand and fingers for several hours until the local anesthetic medication wears off.   *Mild discomfort at the surgical site.   *Mild swelling/stiffness in the hand and fingers.     HOME CARE INSTRUCTIONS   ACTIVITY   *Have a responsible person stay with you until tomorrow.   *Do not operate hazardous machinery for 24 hours after surgery.   *Use the arm as a helper arm, initially (do not lift anything heavier than a glass of water)  *Keep fingers moving using gentle, but full range of motion - begin on day 3 post-operatively as tolerated.   *Keep wrist moving with gentle range of motion as tolerated after 3-5 days postoperatively (to allow incision site to close).   *Do not use any kind of wrist splint.   *Keep hand elevated for 3 days as much as practical.     DIET   *Resume normal diet as tolerated.  *Do not drink alcoholic beverages for 24 hours after your surgery.     MEDICATIONS   *To relieve your pain, take medication as prescribed by your surgeon.   *Pain medication may cause dizziness or weakness  so use caution.   *Discontinue the medication and call your surgeon if you develop a rash, nausea, or vomiting after taking the medication that has been prescribed to you.   *Do not take sedatives or tranquilizers for 24 hours after surgery unless specifically instructed to do so.   *Do not drive or drink alcohol while taking pain medication.     DRESSING/WOUND CARE   *NOTE: If your dressing is too tight, you may remove the dressing and reapply it in a looser fashion (prior to day 3).   *You will be sent home with a ace/gauze dressing over your hand and wrist that you can leave in place for 3 days, and then remove. May clean area around the wound.  *We recommend that after removing your initial dressing that you apply Band-Aids or clean gauze and tape over the incision site to keep any germs (pet hair/dander, germs, etc.). from entering your wound. You should do this under the wound is sealed.     *A small amount of bleeding is expected after surgery. However, if your bandage becomes soaked with blood, reinforce with additional dressings, do not remove the original dressing, and call your surgeon for further instructions.   *Depending which type of sutures you received, either your sutures will absorb/dissolve on their own under your skin OR if your sutures are above the skin, your sutures will be removed at your first post-operative appointment with orthopedics 10-14 days after your surgery  *Elevating your hand above the level of your heart will help control the pain and swelling.  *If you have questions regarding your dressing/wound care, please call the orthopedics office (if during work week) or go to Urgent Care/ER (if on the weekend).      BATHING INSTRUCTIONS   *Keep wound clean and dry at all times. Cover your hand with a plastic bag to shower before your dressing is removed. Once the dressing is removed, you may shower without covering as long as the wound is sealed and dry (without drainage).  No soaking  "your incision (aka no baths/swimming).     IMPORTANT OBSERVATIONS  Check C-M-S of operative leg every 4 hours while you are awake for the first 48 hours:    * C: color - should appear normal/pink   * M: motion - wiggle fingers and toes.    * S: sensation - able to \"feel\": no numbness, tingling  If you experience changes in C-M-S and/or in severe pain, you may remove the elastic bandages and reapply to - tight enough to provide support for the gauze dressing but loose enough to improve C-M-S. The gauze dressing should NOT be removed when re wrapping the elastic bandage.       SPECIAL INSTRUCTIONS   If you smoke, you are instructed to quit. Also, avoid second hand smoke.   *The use of tobacco increases the risk of wound infection, delayed wound healing, and delayed recovery.     WHAT TO WATCH FOR & WHAT TO DO   Call your surgeon if you have any of the following symptoms:   **Temperature of 101 degrees F or over.   **Pain or bleeding that is not controlled.   **Infected drainage (pus).   **Report any swelling of the operative arm or hand.  **Redness or excessive swelling around the wound area.     SEEK IMMEDIATE MEDICAL CARE IF   **You develop a reaction or have serious side effects to medicines you were given.   **You have uncontrolled bleeding.   **The wound breaks open after the stitches have been removed.   **Call 911 and go to the nearest hospital, if you have shortness of breath or chest pain.    OTHER INFORMATION:  If you have FMLA/short term disability or paperwork of any kind that needs to be filled out by either Dr. Yun or Amy Talley PA-C, please let us know. Please allow 2-3 days for the provider to fill out the above paperwork. If you would like the paperwork faxed to your employer or a specific destination, please let us know. As well, we can happily mail you a copy of the finished paperwork at your request.         Discharge Instructions for Carpal Tunnel Release   You had a carpal tunnel release " procedure to help ease the symptoms of carpal tunnel syndrome. In carpal tunnel syndrome, a nerve in the wrist is compressed and irritated. This causes numbness and pain in the fingers and hand. Carpal tunnel release eases the compression of the nerve. Here are instructions that will help you care for your arm and wrist when you are at home.   Home care  Don't  objects tightly or lift with your affected arm.  Wear your bandage, splint, or cast as directed by your healthcare provider.  Always keep the dressing, splint, or cast dry and clean.  When showering, cover your hand and wrist with plastic and use tape or rubber bands to keep the dressing, splint, or cast dry. Shower as needed.    Use an ice pack, bag of frozen peas, or something similar wrapped in a thin towel on your wrist. Use it to reduce swelling for the first  48 hours. Leave the ice pack on for  20 minutes, then take it off for  20 minutes. Repeat as needed.  Keep your arm elevated above your heart for  24 to 48  hours after surgery.  Do the exercises you learned, as instructed by your provider.  Take pain medicine as directed.  Don t drive until your healthcare provider says it s OK. Never drive while you are taking opioid pain medicine.  Ask your provider when you can go back to work. If your job requires heavy lifting, you may not be able to start working again for several weeks.    Follow-up care  Make a follow-up appointment as directed by your healthcare provider.   Call 911  Call 911 right away if you have any of the following:   Chest pain  Shortness of breath  When to call your healthcare provider  Call your healthcare provider right away if you have any of the following:   A splint, cast, or dressing that is wet  Increased bleeding or drainage from the cut (incision)  Opening of the incision  Fever of  100.4  F ( 38 C) or higher, or as directed by your healthcare provider  Shaking chills  Any new numbness in the fingers or thumb  Blue hand  or fingers  Pain that gets worse with or without activity  Redness, tenderness, or swelling of the incision that gets worse  [employer] last reviewed this educational content on 12/1/2021 2000-2022 [employer]. All rights reserved. This information is not intended as a substitute for professional medical care. Always follow your healthcare professional's instructions.          Post-Anesthesia Patient Instructions    IMMEDIATELY FOLLOWING SURGERY:  Do not drive or operate machinery for the first twenty four hours after surgery.  Do not make any important decisions for twenty four hours after surgery or while taking narcotic pain medications or sedatives.  If you develop intractable nausea and vomiting or a severe headache please notify your doctor immediately.    FOLLOW-UP:  Please make an appointment with your surgeon as instructed. You do not need to follow up with anesthesia unless specifically instructed to do so.    WOUND CARE INSTRUCTIONS (if applicable):  Keep a dry clean dressing on the anesthesia/puncture wound site if there is drainage.  Once the wound has quit draining you may leave it open to air.  Generally you should leave the bandage intact for twenty four hours unless there is drainage.  If the epidural site drains for more than 36-48 hours please call the anesthesia department.    QUESTIONS?:  Please feel free to call your physician or the hospital  if you have any questions, and they will be happy to assist you.

## 2023-03-20 NOTE — ANESTHESIA PROCEDURE NOTES
Airway       Patient location during procedure: OR       Procedure Start/Stop Times: 3/20/2023 12:11 PM and 3/20/2023 12:14 PM  Staff -        CRNA: Zhoaib Humphreys APRN CRNA       Performed By: CRNA  Consent for Airway        Urgency: elective  Indications and Patient Condition       Indications for airway management: yoni-procedural       Induction type:intravenous       Mask difficulty assessment: 0 - not attempted    Final Airway Details       Final airway type: supraglottic airway    Supraglottic Airway Details        Type: LMA       Brand: I-Gel       LMA size: 4    Post intubation assessment        Placement verified by: capnometry, equal breath sounds and chest rise        Number of attempts at approach: 1       Number of other approaches attempted: 0       Secured with: plastic tape       Ease of procedure: easy       Dentition: Intact and Unchanged    Medication(s) Administered   Medication Administration Time: 3/20/2023 12:11 PM

## 2023-03-20 NOTE — OR NURSING
PACU Respiratory Event Documentation     1) Episodes of Apnea greater than or equal to 10 seconds: none    2) Bradypnea - less than 8 breaths per minute: none    3) Pain score on 0 to 10 scale: 4/10    4) Pain-sedation mismatch (yes or no): no    5) Repeated 02 desaturation less than 90% (yes or no): no    Anesthesia notified? (yes or no): no    Any of the above events occuring repeatedly in separate 30 minute intervals may be considered recurrent PACU respiratory events.  Pt discharged to phase II on room air

## 2023-04-04 ENCOUNTER — TRANSFERRED RECORDS (OUTPATIENT)
Dept: HEALTH INFORMATION MANAGEMENT | Facility: CLINIC | Age: 58
End: 2023-04-04

## 2023-05-06 ENCOUNTER — HEALTH MAINTENANCE LETTER (OUTPATIENT)
Age: 58
End: 2023-05-06

## 2023-08-19 NOTE — NURSING NOTE
"No chief complaint on file.      Initial /86 (BP Location: Left arm, Patient Position: Chair, Cuff Size: Adult Regular)   Pulse 91   Temp 97.4  F (36.3  C) (Tympanic)   Ht 1.6 m (5' 3\")   Wt 108 kg (238 lb)   SpO2 96%   BMI 42.16 kg/m   Estimated body mass index is 42.16 kg/m  as calculated from the following:    Height as of this encounter: 1.6 m (5' 3\").    Weight as of this encounter: 108 kg (238 lb).  Medication Reconciliation: complete  Milagros Wheeler LPN            " [phone]

## 2023-09-15 ENCOUNTER — MYC MEDICAL ADVICE (OUTPATIENT)
Dept: FAMILY MEDICINE | Facility: OTHER | Age: 58
End: 2023-09-15

## 2023-09-15 DIAGNOSIS — Z00.00 HEALTHCARE MAINTENANCE: Primary | ICD-10-CM

## 2023-09-15 DIAGNOSIS — I10 BENIGN ESSENTIAL HYPERTENSION: ICD-10-CM

## 2023-09-15 DIAGNOSIS — E66.01 MORBID OBESITY (H): ICD-10-CM

## 2023-09-15 DIAGNOSIS — E78.5 HYPERLIPIDEMIA, UNSPECIFIED HYPERLIPIDEMIA TYPE: ICD-10-CM

## 2023-09-19 NOTE — TELEPHONE ENCOUNTER
Pt called and would like to have her labs done and then schedule an appointment to discuss her labs and her arthritis as it has gotten worse.  Pt informed that PCP is out of clinic until Monday and pt is okay with this waiting to be addressed then.      Please see MCM and advise.

## 2023-11-06 ASSESSMENT — ENCOUNTER SYMPTOMS
CONSTIPATION: 0
SHORTNESS OF BREATH: 0
JOINT SWELLING: 0
ABDOMINAL PAIN: 0
ARTHRALGIAS: 1
SORE THROAT: 0
HEMATOCHEZIA: 0
CHILLS: 0
HEARTBURN: 0
DIARRHEA: 0
PALPITATIONS: 0
MYALGIAS: 1
COUGH: 0
DIZZINESS: 0
FEVER: 0
WEAKNESS: 0
NERVOUS/ANXIOUS: 0
BREAST MASS: 0
HEADACHES: 0
EYE PAIN: 0
HEMATURIA: 0
PARESTHESIAS: 0
FREQUENCY: 0
DYSURIA: 0
NAUSEA: 0

## 2023-11-09 ENCOUNTER — LAB (OUTPATIENT)
Dept: LAB | Facility: OTHER | Age: 58
End: 2023-11-09
Payer: COMMERCIAL

## 2023-11-09 DIAGNOSIS — I10 BENIGN ESSENTIAL HYPERTENSION: ICD-10-CM

## 2023-11-09 DIAGNOSIS — E78.5 HYPERLIPIDEMIA, UNSPECIFIED HYPERLIPIDEMIA TYPE: ICD-10-CM

## 2023-11-09 DIAGNOSIS — E66.01 MORBID OBESITY (H): ICD-10-CM

## 2023-11-09 DIAGNOSIS — Z00.00 HEALTHCARE MAINTENANCE: ICD-10-CM

## 2023-11-09 LAB
ALBUMIN SERPL BCG-MCNC: 4.5 G/DL (ref 3.5–5.2)
ALP SERPL-CCNC: 61 U/L (ref 35–104)
ALT SERPL W P-5'-P-CCNC: 36 U/L (ref 0–50)
ANION GAP SERPL CALCULATED.3IONS-SCNC: 10 MMOL/L (ref 7–15)
AST SERPL W P-5'-P-CCNC: 30 U/L (ref 0–45)
BASOPHILS # BLD AUTO: 0 10E3/UL (ref 0–0.2)
BASOPHILS NFR BLD AUTO: 0 %
BILIRUB SERPL-MCNC: 0.5 MG/DL
BUN SERPL-MCNC: 18.5 MG/DL (ref 6–20)
CALCIUM SERPL-MCNC: 10 MG/DL (ref 8.6–10)
CHLORIDE SERPL-SCNC: 101 MMOL/L (ref 98–107)
CHOLEST SERPL-MCNC: 193 MG/DL
CREAT SERPL-MCNC: 0.8 MG/DL (ref 0.51–0.95)
DEPRECATED HCO3 PLAS-SCNC: 29 MMOL/L (ref 22–29)
EGFRCR SERPLBLD CKD-EPI 2021: 85 ML/MIN/1.73M2
EOSINOPHIL # BLD AUTO: 0.1 10E3/UL (ref 0–0.7)
EOSINOPHIL NFR BLD AUTO: 1 %
ERYTHROCYTE [DISTWIDTH] IN BLOOD BY AUTOMATED COUNT: 12.5 % (ref 10–15)
EST. AVERAGE GLUCOSE BLD GHB EST-MCNC: 108 MG/DL
GLUCOSE SERPL-MCNC: 81 MG/DL (ref 70–99)
HBA1C MFR BLD: 5.4 %
HCT VFR BLD AUTO: 40.9 % (ref 35–47)
HDLC SERPL-MCNC: 55 MG/DL
HGB BLD-MCNC: 14.2 G/DL (ref 11.7–15.7)
IMM GRANULOCYTES # BLD: 0 10E3/UL
IMM GRANULOCYTES NFR BLD: 0 %
LDLC SERPL CALC-MCNC: 113 MG/DL
LYMPHOCYTES # BLD AUTO: 2.5 10E3/UL (ref 0.8–5.3)
LYMPHOCYTES NFR BLD AUTO: 34 %
MCH RBC QN AUTO: 32.5 PG (ref 26.5–33)
MCHC RBC AUTO-ENTMCNC: 34.7 G/DL (ref 31.5–36.5)
MCV RBC AUTO: 94 FL (ref 78–100)
MONOCYTES # BLD AUTO: 0.6 10E3/UL (ref 0–1.3)
MONOCYTES NFR BLD AUTO: 8 %
NEUTROPHILS # BLD AUTO: 4.2 10E3/UL (ref 1.6–8.3)
NEUTROPHILS NFR BLD AUTO: 57 %
NONHDLC SERPL-MCNC: 138 MG/DL
NRBC # BLD AUTO: 0 10E3/UL
NRBC BLD AUTO-RTO: 0 /100
PLATELET # BLD AUTO: 276 10E3/UL (ref 150–450)
POTASSIUM SERPL-SCNC: 4.4 MMOL/L (ref 3.4–5.3)
PROT SERPL-MCNC: 7.4 G/DL (ref 6.4–8.3)
RBC # BLD AUTO: 4.37 10E6/UL (ref 3.8–5.2)
SODIUM SERPL-SCNC: 140 MMOL/L (ref 135–145)
TRIGL SERPL-MCNC: 124 MG/DL
WBC # BLD AUTO: 7.4 10E3/UL (ref 4–11)

## 2023-11-09 PROCEDURE — 80053 COMPREHEN METABOLIC PANEL: CPT

## 2023-11-09 PROCEDURE — 36415 COLL VENOUS BLD VENIPUNCTURE: CPT

## 2023-11-09 PROCEDURE — 80061 LIPID PANEL: CPT

## 2023-11-09 PROCEDURE — 83525 ASSAY OF INSULIN: CPT

## 2023-11-09 PROCEDURE — 83036 HEMOGLOBIN GLYCOSYLATED A1C: CPT

## 2023-11-09 PROCEDURE — 85025 COMPLETE CBC W/AUTO DIFF WBC: CPT

## 2023-11-09 ASSESSMENT — ENCOUNTER SYMPTOMS
DIZZINESS: 0
PALPITATIONS: 0
WEAKNESS: 0
DIARRHEA: 0
NAUSEA: 0
SORE THROAT: 0
BREAST MASS: 0
HEMATURIA: 0
HEMATOCHEZIA: 0
FEVER: 0
SHORTNESS OF BREATH: 0
EYE PAIN: 0
HEADACHES: 0
HEARTBURN: 0
ABDOMINAL PAIN: 0
CONSTIPATION: 0
ARTHRALGIAS: 1
JOINT SWELLING: 0
PARESTHESIAS: 0
FREQUENCY: 0
NERVOUS/ANXIOUS: 0
CHILLS: 0
DYSURIA: 0
COUGH: 0
MYALGIAS: 1

## 2023-11-09 NOTE — PROGRESS NOTES
SUBJECTIVE:   CC: Rosalie is an 58 year old who presents for preventive health visit.       Healthy Habits:     Getting at least 3 servings of Calcium per day:  Yes    Bi-annual eye exam:  Yes    Dental care twice a year:  NO    Sleep apnea or symptoms of sleep apnea:  None    Diet:  Low salt and Breakfast skipped    Frequency of exercise:  4-5 days/week    Duration of exercise:  30-45 minutes    Taking medications regularly:  Yes    Medication side effects:  None    Additional concerns today:  Yes    Vaccines - flu vaccine agreeable; declines covid, tdap  Pap/hpv - agreeable; remote abnormality  Mammo - scheduled 11/22/23  Had labs prior  Colonoscopy 2015 - due 1/2025    Insulin resistance.  A1c and metabolic panel normal.  Weight down - 230 to 192; goal weight 160  Hip feeling better since weight loss.  Umary - hyaluronic acid and glucosamine supplement.  Started 2 weeks ago - joint pain is sginficantly improved - right index; stiffness of body - shoulder, knees/hips.  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  Ryze - mushroom coffee.  Helps with swelling and brain fog - [facility] pharmacist - will check    Hyperlipidemia Follow-Up - zocor 40 mg -   Are you regularly taking any medication or supplement to lower your cholesterol?   Yes- zocor 40mg  Are you having muscle aches or other side effects that you think could be caused by your cholesterol lowering medication?  No    Hypertension Follow-up and hypokalemia - chlorthalidone, lisinopril 20 mg, kcl 20  Do you check your blood pressure regularly outside of the clinic? No   Are you following a low salt diet? Yes  Are your blood pressures ever more than 140 on the top number (systolic) OR more   than 90 on the bottom number (diastolic), for example 140/90? No      Anxiety Follow-Up  How are you doing with your anxiety since your last visit? No change  Are you having other symptoms that might be associated with anxiety? No  Have you had a significant life event?  No   Are you feeling depressed? No  Do you have any concerns with your use of alcohol or other drugs? No  Uses CBC gummies, marijuana    Social History     Tobacco Use    Smoking status: Never    Smokeless tobacco: Never   Vaping Use    Vaping Use: Never used   Substance Use Topics    Alcohol use: Yes     Comment: occasionally, 2x/week    Drug use: Yes     Types: Marijuana     Comment: Smoke and edibles on occasion. Delta 9 every night for sleep         5/24/2018     4:00 PM 8/5/2019     8:00 AM 5/16/2022     3:00 PM   VARSHA-7 SCORE   Total Score 3 4 0         5/24/2018     4:00 PM 8/5/2019     8:00 AM 5/16/2022     3:00 PM   PHQ   PHQ-9 Total Score 2 3 0   Q9: Thoughts of better off dead/self-harm past 2 weeks Not at all Not at all Not at all         5/16/2022     3:00 PM   Last PHQ-9   1.  Little interest or pleasure in doing things 0   2.  Feeling down, depressed, or hopeless 0   3.  Trouble falling or staying asleep, or sleeping too much 0   4.  Feeling tired or having little energy 0   5.  Poor appetite or overeating 0   6.  Feeling bad about yourself 0   7.  Trouble concentrating 0   8.  Moving slowly or restless 0   Q9: Thoughts of better off dead/self-harm past 2 weeks 0   PHQ-9 Total Score 0         5/16/2022     3:00 PM   VARSHA-7    1. Feeling nervous, anxious, or on edge 0   2. Not being able to stop or control worrying 0   3. Worrying too much about different things 0   4. Trouble relaxing 0   5. Being so restless that it is hard to sit still 0   6. Becoming easily annoyed or irritable 0   7. Feeling afraid, as if something awful might happen 0   VARSHA-7 Total Score 0     Have you ever done Advance Care Planning? (For example, a Health Directive, POLST, or a discussion with a medical provider or your loved ones about your wishes):     Social History     Tobacco Use    Smoking status: Never    Smokeless tobacco: Never   Substance Use Topics    Alcohol use: Yes     Comment: occasionally, 2x/week              2023    10:15 AM   Alcohol Use   Prescreen: >3 drinks/day or >7 drinks/week? No     Reviewed orders with patient.  Reviewed health maintenance and updated orders accordingly - Yes  Current Outpatient Medications   Medication    atorvastatin (LIPITOR) 40 MG tablet    chlorthalidone (HYGROTON) 25 MG tablet    lisinopril (ZESTRIL) 20 MG tablet    multivitamin w/minerals (THERA-VIT-M) tablet    NONFORMULARY    potassium chloride ER (K-TAB) 20 MEQ CR tablet     No current facility-administered medications for this visit.       Lab work is in process  Labs reviewed in EPIC    Breast Cancer Screenin/6/2023    10:16 AM   Breast CA Risk Assessment (FHS-7)   Do you have a family history of breast, colon, or ovarian cancer? No / Unknown         Mammogram Screening: Recommended mammography every 1-2 years with patient discussion and risk factor consideration  Pertinent mammograms are reviewed under the imaging tab.    History of abnormal Pap smear: NO - age 30-65 PAP every 5 years with negative HPV co-testing recommended      Latest Ref Rng & Units 2018    10:45 AM   PAP / HPV   PAP (Historical)  NIL    HPV 16 DNA NEG^Negative Negative    HPV 18 DNA NEG^Negative Negative    Other HR HPV NEG^Negative Negative      Reviewed and updated as needed this visit by clinical staff   Tobacco  Allergies  Meds    Surg Hx           Reviewed and updated as needed this visit by Provider        Surg Hx          Past Medical History:   Diagnosis Date    Anxiety     Arthralgia     negative lyme, VINAY, RF, TSH, 2017    Arthritis     Benign essential hypertension     Postmenopausal bleeding     ? U/S & Endometrial biopsy done at [facility]?      Past Surgical History:   Procedure Laterality Date     tubal ligation Bilateral          SECTION  /    2    COLONOSCOPY      negative    DECOMPRESSION CUBITAL TUNNEL Left 3/20/2023    Procedure: Left Cubital Tunnel Release;  Surgeon: Forrest Palacios MD;   Location: [facility] OR    EMG      Dr Fitzpatrick; mod-sev right CTS, mild-mod left CTS; moder left, mild right cubital tunnel    ENDOSCOPY      negative    IR SINOGRAM INJECTION DIAGNOSTIC  2020    IR SINOGRAM INJECTION DIAGNOSTIC  2020    IR SINOGRAM INJECTION DIAGNOSTIC  2020    LAPAROSCOPIC APPENDECTOMY N/A 2020    Procedure: APPENDECTOMY, LAPAROSCOPIC;  Surgeon: Keegan Burton DO;  Location: [facility]    LAPAROSCOPY DIAGNOSTIC (GENERAL) N/A 2021    Procedure: exploratory laparoscopy with lysis of adhesions, drainage of abscess;  Surgeon: Albert Fernandez MD;  Location: [facility] OR    RELEASE CARPAL TUNNEL Bilateral 3/20/2023    Procedure: Bilateral Carpal Tunnel Release;  Surgeon: Forrest Palacios MD;  Location: [facility] OR     OB History    Para Term  AB Living   3 3 3 0 0 0   SAB IAB Ectopic Multiple Live Births   0 0 0 0 0      # Outcome Date GA Lbr Tony/2nd Weight Sex Delivery Anes PTL Lv   3 Term            2 Term            1 Term                Review of Systems   Constitutional:  Negative for chills and fever.   HENT:  Negative for congestion, ear pain, hearing loss and sore throat.    Eyes:  Negative for pain and visual disturbance.   Respiratory:  Negative for cough and shortness of breath.    Cardiovascular:  Negative for chest pain, palpitations and peripheral edema.   Gastrointestinal:  Negative for abdominal pain, constipation, diarrhea, heartburn, hematochezia and nausea.   Breasts:  Negative for tenderness, breast mass and discharge.   Genitourinary:  Negative for dysuria, frequency, genital sores, hematuria, pelvic pain, urgency, vaginal bleeding and vaginal discharge.   Musculoskeletal:  Positive for arthralgias and myalgias. Negative for joint swelling.   Skin:  Negative for rash.   Neurological:  Negative for dizziness, weakness, headaches and paresthesias.   Psychiatric/Behavioral:  Negative for mood changes. The patient is not nervous/anxious.           OBJECTIVE:  "  /70 (BP Location: Right arm, Patient Position: Sitting, Cuff Size: Adult Regular)   Pulse 67   Temp 98  F (36.7  C) (Tympanic)   Ht 1.588 m (5' 2.5\")   Wt 87.1 kg (192 lb)   LMP 10/18/2017   SpO2 98%   BMI 34.56 kg/m    Physical Exam  GENERAL APPEARANCE: healthy, alert, no distress, and over weight  EYES: Eyes grossly normal to inspection, PERRL and conjunctivae and sclerae normal  HENT: ear canals and TM's normal, nose and mouth without ulcers or lesions, oropharynx clear and oral mucous membranes moist  NECK: no adenopathy, no asymmetry, masses, or scars and thyroid normal to palpation  RESP: lungs clear to auscultation - no rales, rhonchi or wheezes  BREAST: normal without masses, tenderness or nipple discharge and no palpable axillary masses or adenopathy  CV: regular rate and rhythm, normal S1 S2, no S3 or S4, no murmur, click or rub, no peripheral edema and peripheral pulses strong  ABDOMEN: soft, nontender, no hepatosplenomegaly, no masses and bowel sounds normal   (female): normal female external genitalia, normal urethral meatus, vaginal mucosal atrophy noted, normal cervix, adnexae, and uterus without masses or abnormal discharge  MS: no musculoskeletal defects are noted and gait is age appropriate without ataxia  SKIN: no suspicious lesions or rashes  NEURO: Normal strength and tone, sensory exam grossly normal, mentation intact and speech normal  PSYCH: mentation appears normal and affect normal/bright    Diagnostic Test Results:  Labs reviewed in Epic    ASSESSMENT/PLAN:       ICD-10-CM    1. Routine general medical examination at a health care facility  Z00.00       2. Hyperlipidemia, unspecified hyperlipidemia type  E78.5 atorvastatin (LIPITOR) 40 MG tablet      3. Benign essential hypertension  I10       4. Hypokalemia  E87.6       5. Obesity (BMI 30.0-34.9)  E66.9       6. Cervical cancer screening  Z12.4 A pap thin layer screen with  HPV - recommended age 30 - 65 years      7. " "Encounter for screening mammogram for breast cancer  Z12.31         Hyperlipidemia- LDL >100; change from zocor to lipitor 40 mg.  Hypertension - continue hydrochlorothiazide and lisinopril 20 mg.   Hypokalemia -stable with KCl replacement.  Obesity - significant weight loss with diet and exercise; congratulated on progress; continue with efforts.  Mammogram scheduled.  Colonoscopy due 2025.  Flu shot given.  Declines covid and Tdap.  Note to [facility] pharmacist regarding supplements.      COUNSELING:  Reviewed preventive health counseling, as reflected in patient instructions       Regular exercise       Healthy diet/nutrition       Vision screening       Hearing screening       Alcohol Use       Osteoporosis prevention/bone health       Colorectal Cancer Screening       Consider Hep C screening for all patients one time for ages 18-79 years       HIV screeninx in teen years, 1x in adult years, and at intervals if high risk      BMI:   Estimated body mass index is 34.56 kg/m  as calculated from the following:    Height as of this encounter: 1.588 m (5' 2.5\").    Weight as of this encounter: 87.1 kg (192 lb).   Weight management plan: Discussed healthy diet and exercise guidelines      She reports that she has never smoked. She has never used smokeless tobacco.          Anne Morrison MD  [facility] [place]  "

## 2023-11-09 NOTE — PATIENT INSTRUCTIONS
Labs reviewed.  Change to Lipitor 40 mg for lipids.  Continue with healthy diet, exercise, weight management.  Note to pharmacist regarding supplements.  Mammogram scheduled.  Colonoscopy due 1/2025.    Preventive Health Recommendations  Female Ages 50 - 64    Yearly exam: See your health care provider every year in order to  Review health changes.   Discuss preventive care.    Review your medicines if your doctor has prescribed any.    Get a Pap test every three years (unless you have an abnormal result and your provider advises testing more often).  If you get Pap tests with HPV test, you only need to test every 5 years, unless you have an abnormal result.   You do not need a Pap test if your uterus was removed (hysterectomy) and you have not had cancer.  You should be tested each year for STDs (sexually transmitted diseases) if you're at risk.   Have a mammogram every 1 to 2 years.  Have a colonoscopy at age 45, or have a yearly FIT test (stool test). These exams screen for colon cancer.    Have a cholesterol test every 5 years, or more often if advised.  Have a diabetes test (fasting glucose) every three years. If you are at risk for diabetes, you should have this test more often.   If you are at risk for osteoporosis (brittle bone disease), think about having a bone density scan (DEXA).    Shots: Get a flu shot each year. Get a tetanus shot every 10 years.    Nutrition:   Eat at least 5 servings of fruits and vegetables each day.  Eat whole-grain bread, whole-wheat pasta and brown rice instead of white grains and rice.  Get adequate Calcium and Vitamin D.     Lifestyle  Exercise at least 150 minutes a week (30 minutes a day, 5 days a week). This will help you control your weight and prevent disease.  Limit alcohol to one drink per day.  No smoking.   Wear sunscreen to prevent skin cancer.   See your dentist every six months for an exam and cleaning.  See your eye doctor every 1 to 2 years.

## 2023-11-10 LAB — INSULIN SERPL-ACNC: 25.1 UU/ML (ref 2.6–24.9)

## 2023-11-13 ENCOUNTER — OFFICE VISIT (OUTPATIENT)
Dept: FAMILY MEDICINE | Facility: OTHER | Age: 58
End: 2023-11-13
Attending: FAMILY MEDICINE
Payer: COMMERCIAL

## 2023-11-13 VITALS
HEIGHT: 63 IN | WEIGHT: 192 LBS | SYSTOLIC BLOOD PRESSURE: 136 MMHG | BODY MASS INDEX: 34.02 KG/M2 | DIASTOLIC BLOOD PRESSURE: 70 MMHG | HEART RATE: 67 BPM | OXYGEN SATURATION: 98 % | TEMPERATURE: 98 F

## 2023-11-13 DIAGNOSIS — Z12.4 CERVICAL CANCER SCREENING: ICD-10-CM

## 2023-11-13 DIAGNOSIS — E66.811 OBESITY (BMI 30.0-34.9): ICD-10-CM

## 2023-11-13 DIAGNOSIS — Z12.31 ENCOUNTER FOR SCREENING MAMMOGRAM FOR BREAST CANCER: ICD-10-CM

## 2023-11-13 DIAGNOSIS — E87.6 HYPOKALEMIA: ICD-10-CM

## 2023-11-13 DIAGNOSIS — E78.5 HYPERLIPIDEMIA, UNSPECIFIED HYPERLIPIDEMIA TYPE: ICD-10-CM

## 2023-11-13 DIAGNOSIS — Z00.00 ROUTINE GENERAL MEDICAL EXAMINATION AT A HEALTH CARE FACILITY: Primary | ICD-10-CM

## 2023-11-13 DIAGNOSIS — I10 BENIGN ESSENTIAL HYPERTENSION: ICD-10-CM

## 2023-11-13 PROCEDURE — 87624 HPV HI-RISK TYP POOLED RSLT: CPT | Performed by: FAMILY MEDICINE

## 2023-11-13 PROCEDURE — 90686 IIV4 VACC NO PRSV 0.5 ML IM: CPT | Performed by: FAMILY MEDICINE

## 2023-11-13 PROCEDURE — 99213 OFFICE O/P EST LOW 20 MIN: CPT | Mod: 25 | Performed by: FAMILY MEDICINE

## 2023-11-13 PROCEDURE — G0123 SCREEN CERV/VAG THIN LAYER: HCPCS | Performed by: FAMILY MEDICINE

## 2023-11-13 PROCEDURE — 90471 IMMUNIZATION ADMIN: CPT | Performed by: FAMILY MEDICINE

## 2023-11-13 PROCEDURE — 99396 PREV VISIT EST AGE 40-64: CPT | Mod: 25 | Performed by: FAMILY MEDICINE

## 2023-11-13 RX ORDER — ATORVASTATIN CALCIUM 40 MG/1
40 TABLET, FILM COATED ORAL DAILY
Qty: 90 TABLET | Refills: 3 | Status: SHIPPED | OUTPATIENT
Start: 2023-11-13 | End: 2024-09-05

## 2023-11-13 ASSESSMENT — PAIN SCALES - GENERAL: PAINLEVEL: NO PAIN (0)

## 2023-11-20 LAB
BKR LAB AP GYN ADEQUACY: NORMAL
BKR LAB AP GYN INTERPRETATION: NORMAL
BKR LAB AP HPV REFLEX: NORMAL
BKR LAB AP PREVIOUS ABNORMAL: NORMAL
PATH REPORT.COMMENTS IMP SPEC: NORMAL
PATH REPORT.COMMENTS IMP SPEC: NORMAL
PATH REPORT.RELEVANT HX SPEC: NORMAL

## 2023-11-21 LAB
HUMAN PAPILLOMA VIRUS 16 DNA: POSITIVE
HUMAN PAPILLOMA VIRUS 18 DNA: NEGATIVE
HUMAN PAPILLOMA VIRUS FINAL DIAGNOSIS: ABNORMAL
HUMAN PAPILLOMA VIRUS OTHER HR: NEGATIVE

## 2023-11-22 ENCOUNTER — ANCILLARY PROCEDURE (OUTPATIENT)
Dept: MAMMOGRAPHY | Facility: OTHER | Age: 58
End: 2023-11-22
Attending: FAMILY MEDICINE
Payer: COMMERCIAL

## 2023-11-22 ENCOUNTER — TELEPHONE (OUTPATIENT)
Dept: MAMMOGRAPHY | Facility: OTHER | Age: 58
End: 2023-11-22

## 2023-11-22 DIAGNOSIS — Z12.31 VISIT FOR SCREENING MAMMOGRAM: ICD-10-CM

## 2023-11-22 PROCEDURE — 77067 SCR MAMMO BI INCL CAD: CPT | Mod: TC | Performed by: RADIOLOGY

## 2023-11-22 PROCEDURE — 77063 BREAST TOMOSYNTHESIS BI: CPT | Mod: TC | Performed by: RADIOLOGY

## 2024-02-08 ENCOUNTER — MYC MEDICAL ADVICE (OUTPATIENT)
Dept: FAMILY MEDICINE | Facility: OTHER | Age: 59
End: 2024-02-08

## 2024-02-12 NOTE — TELEPHONE ENCOUNTER
Appt canceled pt declined to be seen.     2/12/2024 4:18 PM  Called pt no answer.  Tg Saldana RN

## 2024-02-28 DIAGNOSIS — I10 BENIGN ESSENTIAL HYPERTENSION: ICD-10-CM

## 2024-02-28 RX ORDER — LISINOPRIL 20 MG/1
20 TABLET ORAL DAILY
Qty: 90 TABLET | Refills: 3 | Status: SHIPPED | OUTPATIENT
Start: 2024-02-28

## 2024-02-28 RX ORDER — CHLORTHALIDONE 25 MG/1
25 TABLET ORAL DAILY
Qty: 90 TABLET | Refills: 3 | Status: SHIPPED | OUTPATIENT
Start: 2024-02-28

## 2024-02-28 NOTE — TELEPHONE ENCOUNTER
Chlorthalidone (Hygroton) 25 MG tablet     Last Written Prescription Date:  03/13/2023  Last Fill Quantity: 90,   # refills: 3  Last Office Visit: 11/13/2023        Lisinopril (Zestril) 20 Mg tablet   Last Written Prescription Date:  03/13/2023  Last Fill Quantity: 90,   # refills: 3

## 2024-04-29 ENCOUNTER — TELEPHONE (OUTPATIENT)
Dept: FAMILY MEDICINE | Facility: OTHER | Age: 59
End: 2024-04-29

## 2024-04-29 DIAGNOSIS — E87.6 HYPOKALEMIA: ICD-10-CM

## 2024-04-29 DIAGNOSIS — E78.5 HYPERLIPIDEMIA, UNSPECIFIED HYPERLIPIDEMIA TYPE: Primary | ICD-10-CM

## 2024-04-29 DIAGNOSIS — I10 BENIGN ESSENTIAL HYPERTENSION: ICD-10-CM

## 2024-04-29 NOTE — TELEPHONE ENCOUNTER
3:48 PM    Reason for Call: Phone Call    Description: Patient called wanting to do labs. Patient was supposed to have labs on 04/16/24 but patients orders for lab are no longer showing. Could these be re ordered.Please call patient and let her know.    Was an appointment offered for this call? No  If yes : Appointment type              Date    Preferred method for responding to this message: Telephone Call  What is your phone number ?   [phone]    If we cannot reach you directly, may we leave a detailed response at the number you provided? Yes    Can this message wait until your PCP/provider returns, if available today? YES,Provider is in today    Radha Lyles

## 2024-09-04 DIAGNOSIS — E78.5 HYPERLIPIDEMIA, UNSPECIFIED HYPERLIPIDEMIA TYPE: ICD-10-CM

## 2024-09-04 NOTE — TELEPHONE ENCOUNTER
Atorvastatin (Lipitor) 40 MG tablet     Last Written Prescription Date:  11/13/2023  Last Fill Quantity: 90,   # refills: 3  Last Office Visit: 11/13/2023

## 2024-09-05 RX ORDER — ATORVASTATIN CALCIUM 40 MG/1
40 TABLET, FILM COATED ORAL DAILY
Qty: 90 TABLET | Refills: 0 | Status: SHIPPED | OUTPATIENT
Start: 2024-09-05

## 2024-10-14 NOTE — PLAN OF CARE
Called patient. She states the 30g supply she was last given is supposed to last 210 days. Advised patient based on the prescribed dosing it is. Patient sates she uses the recommended amount but she needs a refill. Requests it sent to local pharmacy and she will pay OOP as needed. Script sent   Face to face report given with opportunity to observe patient.    Report given to JOHN Paredes RN   12/6/2020  3:05 PM

## 2025-04-28 SDOH — HEALTH STABILITY: PHYSICAL HEALTH: ON AVERAGE, HOW MANY MINUTES DO YOU ENGAGE IN EXERCISE AT THIS LEVEL?: 40 MIN

## 2025-04-28 SDOH — HEALTH STABILITY: PHYSICAL HEALTH: ON AVERAGE, HOW MANY DAYS PER WEEK DO YOU ENGAGE IN MODERATE TO STRENUOUS EXERCISE (LIKE A BRISK WALK)?: 5 DAYS

## 2025-04-28 ASSESSMENT — SOCIAL DETERMINANTS OF HEALTH (SDOH): HOW OFTEN DO YOU GET TOGETHER WITH FRIENDS OR RELATIVES?: ONCE A WEEK

## 2025-04-29 NOTE — PATIENT INSTRUCTIONS
Medications refilled.  Pap/hpv - will notify of results.  Return for fasting labs.  Mammogram up to date - due 2/2026.  Cologuard - will arrive in mail.      Patient Education   Preventive Care Advice   This is general advice given by our system to help you stay healthy. However, your care team may have specific advice just for you. Please talk to your care team about your preventive care needs.  Nutrition  Eat 5 or more servings of fruits and vegetables each day.  Try wheat bread, brown rice and whole grain pasta (instead of white bread, rice, and pasta).  Get enough calcium and vitamin D. Check the label on foods and aim for 100% of the RDA (recommended daily allowance).  Lifestyle  Exercise at least 150 minutes each week  (30 minutes a day, 5 days a week).  Do muscle strengthening activities 2 days a week. These help control your weight and prevent disease.  No smoking.  Wear sunscreen to prevent skin cancer.  Have a dental exam and cleaning every 6 months.  Yearly exams  See your health care team every year to talk about:  Any changes in your health.  Any medicines your care team has prescribed.  Preventive care, family planning, and ways to prevent chronic diseases.  Shots (vaccines)   HPV shots (up to age 26), if you've never had them before.  Hepatitis B shots (up to age 59), if you've never had them before.  COVID-19 shot: Get this shot when it's due.  Flu shot: Get a flu shot every year.  Tetanus shot: Get a tetanus shot every 10 years.  Pneumococcal, hepatitis A, and RSV shots: Ask your care team if you need these based on your risk.  Shingles shot (for age 50 and up)  General health tests  Diabetes screening:  Starting at age 35, Get screened for diabetes at least every 3 years.  If you are younger than age 35, ask your care team if you should be screened for diabetes.  Cholesterol test: At age 39, start having a cholesterol test every 5 years, or more often if advised.  Bone density scan (DEXA): At age  50, ask your care team if you should have this scan for osteoporosis (brittle bones).  Hepatitis C: Get tested at least once in your life.  STIs (sexually transmitted infections)  Before age 24: Ask your care team if you should be screened for STIs.  After age 24: Get screened for STIs if you're at risk. You are at risk for STIs (including HIV) if:  You are sexually active with more than one person.  You don't use condoms every time.  You or a partner was diagnosed with a sexually transmitted infection.  If you are at risk for HIV, ask about PrEP medicine to prevent HIV.  Get tested for HIV at least once in your life, whether you are at risk for HIV or not.  Cancer screening tests  Cervical cancer screening: If you have a cervix, begin getting regular cervical cancer screening tests starting at age 21.  Breast cancer scan (mammogram): If you've ever had breasts, begin having regular mammograms starting at age 40. This is a scan to check for breast cancer.  Colon cancer screening: It is important to start screening for colon cancer at age 45.  Have a colonoscopy test every 10 years (or more often if you're at risk) Or, ask your provider about stool tests like a FIT test every year or Cologuard test every 3 years.  To learn more about your testing options, visit:   .  For help making a decision, visit:   https://bit.ly/xn26906.  Prostate cancer screening test: If you have a prostate, ask your care team if a prostate cancer screening test (PSA) at age 55 is right for you.  Lung cancer screening: If you are a current or former smoker ages 50 to 80, ask your care team if ongoing lung cancer screenings are right for you.  For informational purposes only. Not to replace the advice of your health care provider. Copyright   2023 [employer]. All rights reserved. Clinically reviewed by the [facility] Transitions Program. [employer] 869558 - REV 01/24.  Substance Use Disorder: Care Instructions  Overview      You can improve your life and health by stopping your use of alcohol or drugs. When you don't drink or use drugs, you may feel and sleep better. You may get along better with your family, friends, and coworkers. There are medicines and programs that can help with substance use disorder.  How can you care for yourself at home?  Here are some ways to help you stay sober and prevent relapse.  If you have been given medicine to help keep you sober or reduce your cravings, be sure to take it exactly as prescribed.  Talk to your doctor about programs that can help you stop using drugs or drinking alcohol.  Do not keep alcohol or drugs in your home.  Plan ahead. Think about what you'll say if other people ask you to drink or use drugs. Try not to spend time with people who drink or use drugs.  Use the time and money spent on drinking or drugs to do something that's important to you.  Preventing a relapse  Have a plan to deal with relapse. Learn to recognize changes in your thinking that lead you to drink or use drugs. Get help before you start to drink or use drugs again.  Try to stay away from situations, friends, or places that may lead you to drink or use drugs.  If you feel the need to drink alcohol or use drugs again, seek help right away. Call a trusted friend or family member. Some people get support from organizations such as Narcotics Anonymous or [employer] or from treatment facilities.  If you relapse, get help as soon as you can. Some people make a plan with another person that outlines what they want that person to do for them if they relapse. The plan usually includes how to handle the relapse and who to notify in case of relapse.  Don't give up. Remember that a relapse doesn't mean that you have failed. Use the experience to learn the triggers that lead you to drink or use drugs. Then quit again. Recovery is a lifelong process. Many people have several relapses before they are able to quit for  "good.  Follow-up care is a key part of your treatment and safety. Be sure to make and go to all appointments, and call your doctor if you are having problems. It's also a good idea to know your test results and keep a list of the medicines you take.  When should you call for help?   Call 911  anytime you think you may need emergency care. For example, call if you or someone else:    Has overdosed or has withdrawal signs. Be sure to tell the emergency workers that you are or someone else is using or trying to quit using drugs. Overdose or withdrawal signs may include:  Losing consciousness.  Seizure.  Seeing or hearing things that aren't there (hallucinations).     Is thinking or talking about suicide or harming others.   Where to get help 24 hours a day, 7 days a week   If you or someone you know talks about suicide, self-harm, a mental health crisis, a substance use crisis, or any other kind of emotional distress, get help right away. You can:    Call the Suicide and Crisis Lifeline at 988.     Call 2-303-847-YADL (1-329.918.3886).     Text HOME to 789323 to access the Crisis Text Line.   Consider saving these numbers in your phone.  Go to [employer]street for more information or to chat online.  Call your doctor now or seek immediate medical care if:    You are having withdrawal symptoms. These may include nausea or vomiting, sweating, shakiness, and anxiety.   Watch closely for changes in your health, and be sure to contact your doctor if:    You have a relapse.     You need more help or support to stop.   Where can you learn more?  Go to https://www.[employer].net/patiented  Enter H573 in the search box to learn more about \"Substance Use Disorder: Care Instructions.\"  Current as of: August 20, 2024  Content Version: 14.4    6329-8772 [facility] [employer].   Care instructions adapted under license by your healthcare professional. If you have questions about a medical condition or this instruction, always ask " your healthcare professional. [employer] disclaims any warranty or liability for your use of this information.

## 2025-04-30 NOTE — PROGRESS NOTES
"Preventive Care Visit  [facility]  Anne Morrison MD, Family Medicine  May 1, 2025      Assessment & Plan     Routine general medical examination at a health care facility  Preventative cares reviewed.  Vaccines discussed - declined.  Counseled on healthy diet, exercise, weight.  Cancer screenings up to date.    Hyperlipidemia, unspecified hyperlipidemia type  Off statin.  Repeating lipids next week fasting.    Benign essential hypertension  At goal.  Continue diuretic and lisinopril plus potassium.  Patient agrees.  Just wasn't aware she could maintain on diuretic if no edema.    Morbid obesity (H)  BMI 34.    Anxiety  Stable.    Special screening for malignant neoplasms, colon  Declines repeat colonoscopy.  Average risk.  Agreeable to colguard.  - COLOGUARD([employer]); Future    High risk human papillomavirus (HPV) detected  HPV 16 last year.  - HPV and Gynecologic Cytology Panel (Ages 30-65)    Screening for malignant neoplasm of cervix  - HPV and Gynecologic Cytology Panel (Ages 30-65)        BMI  Estimated body mass index is 34.2 kg/m  as calculated from the following:    Height as of this encounter: 1.588 m (5' 2.5\").    Weight as of this encounter: 86.2 kg (190 lb).   Weight management plan: Discussed healthy diet and exercise guidelines    Counseling  Appropriate preventive services were addressed with this patient via screening, questionnaire, or discussion as appropriate for fall prevention, nutrition, physical activity, Tobacco-use cessation, social engagement, weight loss and cognition.  Checklist reviewing preventive services available has been given to the patient.  Reviewed patient's diet, addressing concerns and/or questions.   The patient was instructed to see the dentist every 6 months.       Follow-up  Return in about 53 weeks (around 5/7/2026) for Annual Wellness Visit.    Luis Wiggins is a 60 year old, presenting for the following:  Physical, Lipids, and " Hypertension      HPI    Wants to get off  the chlorthalidone    Vaccines - declines    Colon screen - colonoscopy 2015; negative; adopted; no known family history; no symptoms; declines repeat scope; discuss possible Cologuard instead -     Mammo 2/2025 negative; no symptoms    Pap/hpv 2023 - HPV positive - repeat cotesting due 1 year - which was 11/2024 -     Labs -scheduled her for fasting lab on 5/7     Anxiety - not active    Weight down from 230 in 2023 193.  Now 190.  Goal 160.  Intermittent fasting.  Limiting inflammatory foods.  Exercise - walking - 6000 steps on good day.  All year.  Walks around [place] in [place].      Hyperlipidemia Follow-Up  Are you regularly taking any medication or supplement to lower your cholesterol?   No  Are you having muscle aches or other side effects that you think could be caused by your cholesterol lowering medication?  No  Stopped statin - prior lipitor 40 mg    Hypertension Follow-up  Do you check your blood pressure regularly outside of the clinic? No   Are you following a low salt diet? No  Are your blood pressures ever more than 140 on the top number (systolic) OR more   than 90 on the bottom number (diastolic), for example 140/90? N/A  Lisinopril 20  Chlorthalidone 25   Kcl 20    BP Readings from Last 2 Encounters:   05/01/25 126/72   11/13/23 136/70       Advance Care Planning    Discussed advance care planning with patient; however, patient declined at this time.        4/28/2025   General Health   How would you rate your overall physical health? Good   Feel stress (tense, anxious, or unable to sleep) Only a little   (!) STRESS CONCERN      4/28/2025   Nutrition   Three or more servings of calcium each day? Yes   Diet: Breakfast skipped   How many servings of fruit and vegetables per day? (!) 2-3   How many sweetened beverages each day? 0-1         4/28/2025   Exercise   Days per week of moderate/strenous exercise 5 days   Average minutes spent exercising at this  level 40 min         4/28/2025   Social Factors   Frequency of gathering with friends or relatives Once a week   Worry food won't last until get money to buy more No   Food not last or not have enough money for food? No   Do you have housing? (Housing is defined as stable permanent housing and does not include staying outside in a car, in a tent, in an abandoned building, in an overnight shelter, or couch-surfing.) Yes   Are you worried about losing your housing? No   Lack of transportation? No   Unable to get utilities (heat,electricity)? No         4/28/2025   Fall Risk   Fallen 2 or more times in the past year? No   Trouble with walking or balance? No          4/28/2025   Dental   Dentist two times every year? (!) NO               4/28/2025   Substance Use   Alcohol more than 3/day or more than 7/wk No   Do you use any other substances recreationally? (!) CANNABIS PRODUCTS     Social History     Tobacco Use    Smoking status: Never    Smokeless tobacco: Never   Vaping Use    Vaping status: Never Used   Substance Use Topics    Alcohol use: Yes     Comment: occasionally, 2x/week    Drug use: Yes     Types: Marijuana     Comment: Smoke and edibles on occasion. Delta 9 every night for sleep             4/28/2025   Breast Cancer Screening   Family history of breast, colon, or ovarian cancer? No / Unknown         2/7/2025   LAST FHS-7 RESULTS   1st degree relative breast or ovarian cancer Unknown   Any relative bilateral breast cancer Unknown   Any male have breast cancer Unknown   Any ONE woman have BOTH breast AND ovarian cancer Unknown   Any woman with breast cancer before 50yrs Unknown   2 or more relatives with breast AND/OR ovarian cancer Unknown   2 or more relatives with breast AND/OR bowel cancer Unknown        Mammogram Screening - Mammogram every 1-2 years updated in Health Maintenance based on mutual decision making          4/28/2025   One time HIV Screening   Previous HIV test? No         4/28/2025   STI  Screening   New sexual partner(s) since last STI/HIV test? No     History of abnormal Pap smear: YES - other categories - see link Cervical Cytology Screening Guidelines        Latest Ref Rng & Units 2023     2:01 PM 2018    10:45 AM   PAP / HPV   PAP  Negative for Intraepithelial Lesion or Malignancy (NILM)     PAP (Historical)   NIL    HPV 16 DNA Negative Positive  Negative    HPV 18 DNA Negative Negative  Negative    Other HR HPV Negative Negative  Negative      ASCVD Risk   The 10-year ASCVD risk score (Jesus DK, et al., 2019) is: 4.2%    Values used to calculate the score:      Age: 60 years      Sex: Female      Is Non- : No      Diabetic: No      Tobacco smoker: No      Systolic Blood Pressure: 126 mmHg      Is BP treated: Yes      HDL Cholesterol: 55 mg/dL      Total Cholesterol: 193 mg/dL         Reviewed and updated as needed this visit by Provider     Meds                Past Medical History:   Diagnosis Date    Anxiety     Arthralgia     negative lyme, VINAY, RF, TSH, 2017    Arthritis     Benign essential hypertension     High risk HPV infection     with pap     Postmenopausal bleeding     ? U/S & Endometrial biopsy done at [facility]?     Past Surgical History:   Procedure Laterality Date     tubal ligation Bilateral          SECTION      2    COLONOSCOPY      negative    DECOMPRESSION CUBITAL TUNNEL Left 3/20/2023    Procedure: Left Cubital Tunnel Release;  Surgeon: Forrest Palacios MD;  [place] [facility] [place]    [facility] [place]; mod-sev right CTS, mild-mod left CTS; moder left, mild right cubital tunnel    ENDOSCOPY      negative    IR SINOGRAM INJECTION DIAGNOSTIC  2020    IR SINOGRAM INJECTION DIAGNOSTIC  2020    IR SINOGRAM INJECTION DIAGNOSTIC  2020    LAPAROSCOPIC APPENDECTOMY N/A 2020    Procedure: APPENDECTOMY, LAPAROSCOPIC;  Surgeon: Keegan Burton DO;  Location: [facility] OR    LAPAROSCOPY  "DIAGNOSTIC (GENERAL) N/A 01/05/2021    Procedure: exploratory laparoscopy with lysis of adhesions, drainage of abscess;  Surgeon: Albert Fernandez MD;  Location: [facility] OR    RELEASE CARPAL TUNNEL Bilateral 3/20/2023    Procedure: Bilateral Carpal Tunnel Release;  Surgeon: Forrest Palacios MD;  Location: [facility] OR         Review of Systems  Constitutional, HEENT, cardiovascular, pulmonary, gi and gu systems are negative, except as otherwise noted.     Objective    Exam  /72 (BP Location: Right arm, Patient Position: Sitting, Cuff Size: Adult Regular)   Pulse 79   Temp 97.6  F (36.4  C) (Tympanic)   Resp 22   Ht 1.588 m (5' 2.5\")   Wt 86.2 kg (190 lb)   LMP 10/18/2017   SpO2 97%   BMI 34.20 kg/m     Estimated body mass index is 34.2 kg/m  as calculated from the following:    Height as of this encounter: 1.588 m (5' 2.5\").    Weight as of this encounter: 86.2 kg (190 lb).    Physical Exam  GENERAL: alert, no distress, and obese  EYES: Eyes grossly normal to inspection, PERRL and conjunctivae and sclerae normal  HENT: ear canals and TM's normal, nose and mouth without ulcers or lesions  NECK: no adenopathy, no asymmetry, masses, or scars  RESP: lungs clear to auscultation - no rales, rhonchi or wheezes  BREAST: normal without masses, tenderness or nipple discharge and no palpable axillary masses or adenopathy  CV: regular rate and rhythm, normal S1 S2, no S3 or S4, no murmur, click or rub, no peripheral edema  ABDOMEN: soft, nontender, no hepatosplenomegaly, no masses and bowel sounds normal   (female): normal female external genitalia, normal urethral meatus, normal vaginal mucosa  MS: no gross musculoskeletal defects noted, no edema  SKIN: no suspicious lesions or rashes  NEURO: Normal strength and tone, mentation intact and speech normal  PSYCH: mentation appears normal, affect normal/bright    Lab next week.  All refills sent.    The longitudinal plan of care for the diagnosis(es)/condition(s) as documented " were addressed during this visit. Due to the added complexity in care, I will continue to support Rosalie in the subsequent management and with ongoing continuity of care.    Signed Electronically by: Anne Morrison MD

## 2025-05-01 ENCOUNTER — OFFICE VISIT (OUTPATIENT)
Dept: FAMILY MEDICINE | Facility: OTHER | Age: 60
End: 2025-05-01
Attending: FAMILY MEDICINE
Payer: COMMERCIAL

## 2025-05-01 VITALS
BODY MASS INDEX: 33.66 KG/M2 | HEIGHT: 63 IN | WEIGHT: 190 LBS | DIASTOLIC BLOOD PRESSURE: 72 MMHG | TEMPERATURE: 97.6 F | HEART RATE: 79 BPM | RESPIRATION RATE: 22 BRPM | SYSTOLIC BLOOD PRESSURE: 126 MMHG | OXYGEN SATURATION: 97 %

## 2025-05-01 DIAGNOSIS — E87.6 HYPOKALEMIA: ICD-10-CM

## 2025-05-01 DIAGNOSIS — R87.810 HIGH RISK HUMAN PAPILLOMAVIRUS (HPV) DETECTED: ICD-10-CM

## 2025-05-01 DIAGNOSIS — E66.01 MORBID OBESITY (H): ICD-10-CM

## 2025-05-01 DIAGNOSIS — Z00.00 ROUTINE GENERAL MEDICAL EXAMINATION AT A HEALTH CARE FACILITY: Primary | ICD-10-CM

## 2025-05-01 DIAGNOSIS — I10 BENIGN ESSENTIAL HYPERTENSION: ICD-10-CM

## 2025-05-01 DIAGNOSIS — F41.9 ANXIETY: ICD-10-CM

## 2025-05-01 DIAGNOSIS — E78.5 HYPERLIPIDEMIA, UNSPECIFIED HYPERLIPIDEMIA TYPE: ICD-10-CM

## 2025-05-01 DIAGNOSIS — Z12.11 SPECIAL SCREENING FOR MALIGNANT NEOPLASMS, COLON: ICD-10-CM

## 2025-05-01 DIAGNOSIS — Z12.4 SCREENING FOR MALIGNANT NEOPLASM OF CERVIX: ICD-10-CM

## 2025-05-01 RX ORDER — POTASSIUM CHLORIDE 1500 MG/1
20 TABLET, EXTENDED RELEASE ORAL DAILY
Qty: 90 TABLET | Refills: 3 | Status: SHIPPED | OUTPATIENT
Start: 2025-05-01

## 2025-05-01 RX ORDER — LISINOPRIL 20 MG/1
20 TABLET ORAL DAILY
Qty: 90 TABLET | Refills: 3 | Status: SHIPPED | OUTPATIENT
Start: 2025-05-01

## 2025-05-01 RX ORDER — CHLORTHALIDONE 25 MG/1
25 TABLET ORAL DAILY
Qty: 90 TABLET | Refills: 3 | Status: SHIPPED | OUTPATIENT
Start: 2025-05-01

## 2025-05-01 ASSESSMENT — PAIN SCALES - GENERAL: PAINLEVEL_OUTOF10: NO PAIN (0)

## 2025-05-06 LAB
HPV HR 12 DNA CVX QL NAA+PROBE: NEGATIVE
HPV16 DNA CVX QL NAA+PROBE: POSITIVE
HPV18 DNA CVX QL NAA+PROBE: NEGATIVE
HUMAN PAPILLOMA VIRUS FINAL DIAGNOSIS: ABNORMAL

## 2025-05-07 ENCOUNTER — LAB (OUTPATIENT)
Dept: LAB | Facility: OTHER | Age: 60
End: 2025-05-07
Payer: COMMERCIAL

## 2025-05-07 ENCOUNTER — RESULTS FOLLOW-UP (OUTPATIENT)
Dept: FAMILY MEDICINE | Facility: OTHER | Age: 60
End: 2025-05-07

## 2025-05-07 DIAGNOSIS — Z13.1 SCREENING FOR DIABETES MELLITUS: ICD-10-CM

## 2025-05-07 DIAGNOSIS — E78.5 HYPERLIPIDEMIA, UNSPECIFIED HYPERLIPIDEMIA TYPE: ICD-10-CM

## 2025-05-07 DIAGNOSIS — R87.810 HUMAN PAPILLOMAVIRUS (HPV) TYPE 16 DNA DETECTED IN CERVICAL SPECIMEN: Primary | ICD-10-CM

## 2025-05-07 DIAGNOSIS — E88.819 INSULIN RESISTANCE: ICD-10-CM

## 2025-05-07 DIAGNOSIS — E66.811 OBESITY (BMI 30.0-34.9): ICD-10-CM

## 2025-05-07 LAB
ALBUMIN SERPL BCG-MCNC: 4.3 G/DL (ref 3.5–5.2)
ALP SERPL-CCNC: 56 U/L (ref 40–150)
ALT SERPL W P-5'-P-CCNC: 24 U/L (ref 0–50)
ANION GAP SERPL CALCULATED.3IONS-SCNC: 14 MMOL/L (ref 7–15)
AST SERPL W P-5'-P-CCNC: 30 U/L (ref 0–45)
BASOPHILS # BLD AUTO: 0 10E3/UL (ref 0–0.2)
BASOPHILS NFR BLD AUTO: 1 %
BILIRUB SERPL-MCNC: 0.6 MG/DL
BUN SERPL-MCNC: 13.5 MG/DL (ref 8–23)
CALCIUM SERPL-MCNC: 9.8 MG/DL (ref 8.8–10.4)
CHLORIDE SERPL-SCNC: 98 MMOL/L (ref 98–107)
CREAT SERPL-MCNC: 0.81 MG/DL (ref 0.51–0.95)
EGFRCR SERPLBLD CKD-EPI 2021: 83 ML/MIN/1.73M2
EOSINOPHIL # BLD AUTO: 0.1 10E3/UL (ref 0–0.7)
EOSINOPHIL NFR BLD AUTO: 1 %
ERYTHROCYTE [DISTWIDTH] IN BLOOD BY AUTOMATED COUNT: 12.4 % (ref 10–15)
EST. AVERAGE GLUCOSE BLD GHB EST-MCNC: 114 MG/DL
GLUCOSE SERPL-MCNC: 78 MG/DL (ref 70–99)
HBA1C MFR BLD: 5.6 %
HCO3 SERPL-SCNC: 27 MMOL/L (ref 22–29)
HCT VFR BLD AUTO: 40.2 % (ref 35–47)
HGB BLD-MCNC: 13.7 G/DL (ref 11.7–15.7)
IMM GRANULOCYTES # BLD: 0 10E3/UL
IMM GRANULOCYTES NFR BLD: 0 %
INSULIN SERPL-ACNC: 8.4 UU/ML (ref 2.6–24.9)
LYMPHOCYTES # BLD AUTO: 2.8 10E3/UL (ref 0.8–5.3)
LYMPHOCYTES NFR BLD AUTO: 45 %
MCH RBC QN AUTO: 31.3 PG (ref 26.5–33)
MCHC RBC AUTO-ENTMCNC: 34.1 G/DL (ref 31.5–36.5)
MCV RBC AUTO: 92 FL (ref 78–100)
MONOCYTES # BLD AUTO: 0.5 10E3/UL (ref 0–1.3)
MONOCYTES NFR BLD AUTO: 8 %
NEUTROPHILS # BLD AUTO: 2.8 10E3/UL (ref 1.6–8.3)
NEUTROPHILS NFR BLD AUTO: 45 %
NRBC # BLD AUTO: 0 10E3/UL
NRBC BLD AUTO-RTO: 0 /100
PLATELET # BLD AUTO: 276 10E3/UL (ref 150–450)
POTASSIUM SERPL-SCNC: 3.1 MMOL/L (ref 3.4–5.3)
PROT SERPL-MCNC: 7.2 G/DL (ref 6.4–8.3)
RBC # BLD AUTO: 4.38 10E6/UL (ref 3.8–5.2)
SODIUM SERPL-SCNC: 139 MMOL/L (ref 135–145)
WBC # BLD AUTO: 6.2 10E3/UL (ref 4–11)

## 2025-05-07 PROCEDURE — 83525 ASSAY OF INSULIN: CPT

## 2025-05-07 PROCEDURE — 83036 HEMOGLOBIN GLYCOSYLATED A1C: CPT

## 2025-05-07 PROCEDURE — 80061 LIPID PANEL: CPT

## 2025-05-07 PROCEDURE — 36415 COLL VENOUS BLD VENIPUNCTURE: CPT

## 2025-05-07 PROCEDURE — 85025 COMPLETE CBC W/AUTO DIFF WBC: CPT

## 2025-05-07 PROCEDURE — 80053 COMPREHEN METABOLIC PANEL: CPT

## 2025-05-08 ENCOUNTER — MYC MEDICAL ADVICE (OUTPATIENT)
Dept: FAMILY MEDICINE | Facility: OTHER | Age: 60
End: 2025-05-08

## 2025-05-08 ENCOUNTER — RESULTS FOLLOW-UP (OUTPATIENT)
Dept: FAMILY MEDICINE | Facility: OTHER | Age: 60
End: 2025-05-08

## 2025-05-08 DIAGNOSIS — E78.5 HYPERLIPIDEMIA, UNSPECIFIED HYPERLIPIDEMIA TYPE: Primary | ICD-10-CM

## 2025-05-08 LAB
BKR AP ASSOCIATED HPV REPORT: NORMAL
BKR LAB AP GYN ADEQUACY: NORMAL
BKR LAB AP GYN INTERPRETATION: NORMAL
BKR LAB AP PREVIOUS ABNL DX: NORMAL
BKR LAB AP PREVIOUS ABNORMAL: NORMAL
CHOLEST SERPL-MCNC: 299 MG/DL
HDLC SERPL-MCNC: 44 MG/DL
LDLC SERPL CALC-MCNC: 220 MG/DL
NONHDLC SERPL-MCNC: 255 MG/DL
PATH REPORT.COMMENTS IMP SPEC: NORMAL
PATH REPORT.COMMENTS IMP SPEC: NORMAL
PATH REPORT.RELEVANT HX SPEC: NORMAL
TRIGL SERPL-MCNC: 177 MG/DL

## 2025-05-08 NOTE — TELEPHONE ENCOUNTER
Looks like an order for lipids was placed in December.  Should I see if lab can run it with the draw from yesterday?

## 2025-06-13 DIAGNOSIS — E78.5 HYPERLIPIDEMIA, UNSPECIFIED HYPERLIPIDEMIA TYPE: ICD-10-CM

## 2025-06-13 NOTE — TELEPHONE ENCOUNTER
Atorvastatin (Lipitor) 40 mg tablet  Take 1 tablet (40 mg) by mouth daily  Last Written Prescription Date:  3-  Last Fill Quantity: 90 tablet,   # refills: 0  Last Office Visit: 5-1-2025  Future Office visit:

## 2025-06-16 RX ORDER — ATORVASTATIN CALCIUM 40 MG/1
40 TABLET, FILM COATED ORAL DAILY
Qty: 90 TABLET | Refills: 3 | Status: SHIPPED | OUTPATIENT
Start: 2025-06-16

## 2025-06-16 NOTE — TELEPHONE ENCOUNTER
Last office visit 5/1/25 and was noted patient was off her statin. Was supposed to repeat her fasting labs in a week. Did not repeat.   Called patient to clarify if she has been taking. No answer. LVM requesting a call back.

## 2025-06-16 NOTE — PROGRESS NOTES
Atorvastatin  Not on current med list.  Discontinued on 4/29/25 by PCP for reason none.  5/1 office visit note states:  Hyperlipidemia, unspecified hyperlipidemia type  Off statin.  Repeating lipids next week fasting.    5/7 lipid ran and was supposed to repeat a week later but not seeing that happened.    Note to Huc to please call patient to schedule lab only.  Angelia Singleton, RN  Care Coordination

## 2025-06-17 NOTE — TELEPHONE ENCOUNTER
Attempt # 1  Outcome: Left Message   Comment: LVM for pt to call and schedule a fasting lab only appt.

## (undated) DEVICE — TROCAR-5X100MM FIOS BLADELESS

## (undated) DEVICE — PACK BASIN SET UP SUTCNBSBBA

## (undated) DEVICE — PADDING CAST 3IN WEBRIL STRL 2394

## (undated) DEVICE — BLANKET-BAIR UPPER BODY

## (undated) DEVICE — DRSG TEGADERM 4X4 3/4" 1626

## (undated) DEVICE — FILTER-PLUMEPORT ACTIV LAPAROSCOPIC PLUME

## (undated) DEVICE — GLV-7.5 BIOGEL LATEX

## (undated) DEVICE — ESU PENCIL W/SMOKE EVAC CVPLP2000

## (undated) DEVICE — COVER LT HANDLE 2/PK 5160-2FG

## (undated) DEVICE — IRRIGATION-H2O 1000ML

## (undated) DEVICE — BIN-COVIDIEN MESH BIN

## (undated) DEVICE — SOL WATER IRRIG 1000ML BOTTLE 2F7114

## (undated) DEVICE — NDL-18G 1 1/2" NON-SAFETY

## (undated) DEVICE — APPLICATOR-CHLORAPREP 26ML TINTED CHG 2%+ 70% IPA-SURGICAL

## (undated) DEVICE — STERILE SLEEVE

## (undated) DEVICE — PACK-BASIN SET-UP

## (undated) DEVICE — LABEL-STERILE PREPRINTED FOR OR

## (undated) DEVICE — CLEARIFY VISUALIZATION SYSTEM (SCOPE WARMER)

## (undated) DEVICE — BNDG ESMARK 4" STERILE LF 820-3412

## (undated) DEVICE — IRRIGATION-H2O 1000ML BAG

## (undated) DEVICE — DRAIN-JP 19F ROUND W/TROCAR

## (undated) DEVICE — TROCAR SLEEVE-KII 5X100MM

## (undated) DEVICE — CORD-LAPAROSCOPIC MONOPOLAR-DISPOSABLE

## (undated) DEVICE — BNDG ELASTIC 4"X5YDS UNSTERILE 6611-40

## (undated) DEVICE — PACK SET UP CUSTOM SBA32SUMBF

## (undated) DEVICE — LABEL STERILE PREPRINTED FOR OR FRRH01-2M

## (undated) DEVICE — DRSG STERI STRIP 1/2X4" R1547

## (undated) DEVICE — TROCAR-12X100MM KII FIOS

## (undated) DEVICE — BNDG ELASTIC 3"X5YDS UNSTERILE 6611-30

## (undated) DEVICE — CANISTER-SUCTION 2000CC

## (undated) DEVICE — DRAPE-THREE QUARTER (LARGE) SHEET

## (undated) DEVICE — IRRIGATION-NACL 1000ML

## (undated) DEVICE — BLADE 15 RB BK SS STRL LF DISPLF DISP 371215

## (undated) DEVICE — TROCAR-BLUNT TIP 12MM BALLOON

## (undated) DEVICE — TROCAR SYSTEM-BLUNT TIP 12X100MM KII BALLOON

## (undated) DEVICE — LIGASURE-5MM BLUNT TIP LAPAROSCOPIC

## (undated) DEVICE — SUTURE-VICRYL 0 UR-6 J603H

## (undated) DEVICE — WANDS-INSULSCAN

## (undated) DEVICE — PACK-LAPAROSCOPY-CUSTOM

## (undated) DEVICE — DRAIN-JP BULB RESERVOIR 100CC

## (undated) DEVICE — SU ETHILON 3-0 PS-2 18" 1669H

## (undated) DEVICE — APPLICATOR BNZN TNCT RYN SWBSTK NWVN SNGL APLS1106

## (undated) DEVICE — TUBE-SALEM SUMP 18FR STOMACH SUCTION

## (undated) DEVICE — STERI-STRIP-1/2" X 4"

## (undated) DEVICE — TOPICAL SKIN ADHESIVE EXOFIN

## (undated) DEVICE — SCD SLEEVE-KNEE REG.

## (undated) DEVICE — MARKER-SKIN REG

## (undated) DEVICE — GOWN-SURG XL LVL 3 REINFORCED

## (undated) DEVICE — GRASPER-EPIX 5MM X 35CM DISPOSABLE

## (undated) DEVICE — DRSG XEROFORM GAUZE 1X8" LP 8884433301

## (undated) DEVICE — IRRIGATION-NACL 3000ML (BAG)

## (undated) DEVICE — SUCTION-IRRIGATION STRYKEFLOW II (STRYKER)

## (undated) DEVICE — IMM SLING SHOULDER LG DELUXE 79-84007

## (undated) DEVICE — CAUTERY PAD-POLYHESIVE II ADULT

## (undated) DEVICE — Device

## (undated) DEVICE — GOWN SURG XL LVL 3 REINFORCED 9541

## (undated) DEVICE — TUBING-SEECLEAR LAPAROSCOPIC SMOKE EVACUATION

## (undated) DEVICE — GLOVE PROTEXIS POWDER FREE 8.0 ORTHOPEDIC 2D73ET80

## (undated) DEVICE — BNDG ELASTIC 4" DBL LENGTH UNSTERILE 6611-14

## (undated) DEVICE — INZII RETRIEVAL SYSTEM-10MM

## (undated) DEVICE — SU MONOCRYL 4-0 PS-2 18" UND Y496G

## (undated) DEVICE — SOL NACL 0.9% IRRIG 1000ML BOTTLE 2F7124

## (undated) DEVICE — PUNCTURE CLOSURE DEVICE

## (undated) DEVICE — GLV-8.5 PROTEXIS PI CLASSIC LF/PF

## (undated) DEVICE — PREP CHLORAPREP 26ML TINTED HI-LITE ORANGE 930815

## (undated) DEVICE — DRSG GAUZE 4X4" 3033

## (undated) DEVICE — PACK-LAPAROTOMY-CUSTOM

## (undated) DEVICE — DRAPE EXTREMITY UPPER 120X76" 29414

## (undated) DEVICE — GOWN SURG XXL LVL 3 REINFORCED 9571

## (undated) DEVICE — DRAPE CONVERTORS U-DRAPE 60X72" 8476

## (undated) DEVICE — LIGHT HANDLE COVER

## (undated) DEVICE — ESU GROUND PAD ADULT W/CORD E7507

## (undated) DEVICE — SUTURE-MONOCRYL 4-0 Y494G

## (undated) DEVICE — DRAPE SHEET REV FOLD 3/4 9349

## (undated) DEVICE — GLV-8.0 PROTEXIS PI CLASSIC LF/PF

## (undated) DEVICE — DRSG-IODOFORM GAUZE 1/4 X 5 YDS.

## (undated) DEVICE — PADDING CAST 4IN WEBRIL STRL 2502

## (undated) DEVICE — SCISSOR-ENDOPATH 5MM CURVED

## (undated) RX ORDER — DEXAMETHASONE SODIUM PHOSPHATE 10 MG/ML
INJECTION, SOLUTION INTRAMUSCULAR; INTRAVENOUS
Status: DISPENSED
Start: 2021-01-05

## (undated) RX ORDER — FENTANYL CITRATE 50 UG/ML
INJECTION, SOLUTION INTRAMUSCULAR; INTRAVENOUS
Status: DISPENSED
Start: 2023-03-20

## (undated) RX ORDER — FENTANYL CITRATE 50 UG/ML
INJECTION, SOLUTION INTRAMUSCULAR; INTRAVENOUS
Status: DISPENSED
Start: 2020-11-22

## (undated) RX ORDER — GLYCOPYRROLATE 0.2 MG/ML
INJECTION, SOLUTION INTRAMUSCULAR; INTRAVENOUS
Status: DISPENSED
Start: 2021-01-05

## (undated) RX ORDER — EPHEDRINE SULFATE 50 MG/ML
INJECTION, SOLUTION INTRAVENOUS
Status: DISPENSED
Start: 2020-11-22

## (undated) RX ORDER — LIDOCAINE HYDROCHLORIDE 20 MG/ML
INJECTION, SOLUTION EPIDURAL; INFILTRATION; INTRACAUDAL; PERINEURAL
Status: DISPENSED
Start: 2020-11-22

## (undated) RX ORDER — NEOSTIGMINE METHYLSULFATE 1 MG/ML
VIAL (ML) INJECTION
Status: DISPENSED
Start: 2020-11-23

## (undated) RX ORDER — ONDANSETRON 2 MG/ML
INJECTION INTRAMUSCULAR; INTRAVENOUS
Status: DISPENSED
Start: 2020-11-22

## (undated) RX ORDER — PROPOFOL 10 MG/ML
INJECTION, EMULSION INTRAVENOUS
Status: DISPENSED
Start: 2020-11-22

## (undated) RX ORDER — GLYCOPYRROLATE 0.2 MG/ML
INJECTION, SOLUTION INTRAMUSCULAR; INTRAVENOUS
Status: DISPENSED
Start: 2020-11-23

## (undated) RX ORDER — ONDANSETRON 2 MG/ML
INJECTION INTRAMUSCULAR; INTRAVENOUS
Status: DISPENSED
Start: 2023-03-20

## (undated) RX ORDER — KETAMINE HCL IN NACL, ISO-OSM 100MG/10ML
SYRINGE (ML) INJECTION
Status: DISPENSED
Start: 2021-01-05

## (undated) RX ORDER — PROPOFOL 10 MG/ML
INJECTION, EMULSION INTRAVENOUS
Status: DISPENSED
Start: 2021-01-05

## (undated) RX ORDER — DEXAMETHASONE SODIUM PHOSPHATE 10 MG/ML
INJECTION, SOLUTION INTRAMUSCULAR; INTRAVENOUS
Status: DISPENSED
Start: 2023-03-20

## (undated) RX ORDER — ONDANSETRON 2 MG/ML
INJECTION INTRAMUSCULAR; INTRAVENOUS
Status: DISPENSED
Start: 2021-01-05

## (undated) RX ORDER — KETOROLAC TROMETHAMINE 30 MG/ML
INJECTION, SOLUTION INTRAMUSCULAR; INTRAVENOUS
Status: DISPENSED
Start: 2020-11-22

## (undated) RX ORDER — FENTANYL CITRATE 50 UG/ML
INJECTION, SOLUTION INTRAMUSCULAR; INTRAVENOUS
Status: DISPENSED
Start: 2021-01-05

## (undated) RX ORDER — LIDOCAINE HYDROCHLORIDE 20 MG/ML
INJECTION, SOLUTION EPIDURAL; INFILTRATION; INTRACAUDAL; PERINEURAL
Status: DISPENSED
Start: 2021-01-05